# Patient Record
Sex: FEMALE | Race: WHITE | NOT HISPANIC OR LATINO | Employment: FULL TIME | URBAN - METROPOLITAN AREA
[De-identification: names, ages, dates, MRNs, and addresses within clinical notes are randomized per-mention and may not be internally consistent; named-entity substitution may affect disease eponyms.]

---

## 2017-05-17 ENCOUNTER — HISTORICAL (OUTPATIENT)
Dept: ADMINISTRATIVE | Facility: HOSPITAL | Age: 57
End: 2017-05-17

## 2017-05-17 LAB
ABS NEUT (OLG): 4.04 X10(3)/MCL (ref 2.1–9.2)
ALBUMIN SERPL-MCNC: 3.7 GM/DL (ref 3.4–5)
ALBUMIN/GLOB SERPL: 1.5 RATIO (ref 1.1–2)
ALP SERPL-CCNC: 62 UNIT/L (ref 38–126)
ALT SERPL-CCNC: 20 UNIT/L (ref 12–78)
AST SERPL-CCNC: 13 UNIT/L (ref 15–37)
BASOPHILS # BLD AUTO: 0.1 X10(3)/MCL (ref 0–0.2)
BASOPHILS NFR BLD AUTO: 1 %
BILIRUB SERPL-MCNC: 0.4 MG/DL (ref 0.2–1)
BILIRUBIN DIRECT+TOT PNL SERPL-MCNC: 0.1 MG/DL (ref 0–0.5)
BILIRUBIN DIRECT+TOT PNL SERPL-MCNC: 0.3 MG/DL (ref 0–0.8)
BUN SERPL-MCNC: 18 MG/DL (ref 7–18)
CALCIUM SERPL-MCNC: 8.8 MG/DL (ref 8.5–10.1)
CHLORIDE SERPL-SCNC: 107 MMOL/L (ref 98–107)
CO2 SERPL-SCNC: 27 MMOL/L (ref 21–32)
CREAT SERPL-MCNC: 0.69 MG/DL (ref 0.55–1.02)
DEPRECATED CALCIDIOL+CALCIFEROL SERPL-MC: 30.21 NG/ML (ref 30–80)
EOSINOPHIL # BLD AUTO: 0.2 X10(3)/MCL (ref 0–0.9)
EOSINOPHIL NFR BLD AUTO: 4 %
ERYTHROCYTE [DISTWIDTH] IN BLOOD BY AUTOMATED COUNT: 13.6 % (ref 11.5–17)
ERYTHROCYTE [SEDIMENTATION RATE] IN BLOOD: 3 MM/HR (ref 0–20)
GLOBULIN SER-MCNC: 2.5 GM/DL (ref 2.4–3.5)
GLUCOSE SERPL-MCNC: 88 MG/DL (ref 74–106)
HCT VFR BLD AUTO: 44.7 % (ref 37–47)
HGB BLD-MCNC: 14.8 GM/DL (ref 12–16)
LYMPHOCYTES # BLD AUTO: 1.8 X10(3)/MCL (ref 0.6–4.6)
LYMPHOCYTES NFR BLD AUTO: 27 %
MCH RBC QN AUTO: 32.5 PG (ref 27–31)
MCHC RBC AUTO-ENTMCNC: 33.1 GM/DL (ref 33–36)
MCV RBC AUTO: 98.2 FL (ref 80–94)
MONOCYTES # BLD AUTO: 0.4 X10(3)/MCL (ref 0.1–1.3)
MONOCYTES NFR BLD AUTO: 7 %
NEUTROPHILS # BLD AUTO: 4.04 X10(3)/MCL (ref 2.1–9.2)
NEUTROPHILS NFR BLD AUTO: 62 %
PLATELET # BLD AUTO: 262 X10(3)/MCL (ref 130–400)
PMV BLD AUTO: 11.6 FL (ref 9.4–12.4)
POTASSIUM SERPL-SCNC: 4.4 MMOL/L (ref 3.5–5.1)
PROT SERPL-MCNC: 6.2 GM/DL (ref 6.4–8.2)
RBC # BLD AUTO: 4.55 X10(6)/MCL (ref 4.2–5.4)
SODIUM SERPL-SCNC: 142 MMOL/L (ref 136–145)
TSH SERPL-ACNC: 1.3 MIU/ML (ref 0.36–3.74)
VIT B12 SERPL-MCNC: 619 PG/ML (ref 193–986)
WBC # SPEC AUTO: 6.6 X10(3)/MCL (ref 4.5–11.5)

## 2017-12-22 ENCOUNTER — HISTORICAL (OUTPATIENT)
Dept: ADMINISTRATIVE | Facility: HOSPITAL | Age: 57
End: 2017-12-22

## 2017-12-22 LAB
ABS NEUT (OLG): 3.29 X10(3)/MCL (ref 2.1–9.2)
ALBUMIN SERPL-MCNC: 3.6 GM/DL (ref 3.4–5)
ALBUMIN/GLOB SERPL: 1.3 RATIO (ref 1.1–2)
ALP SERPL-CCNC: 67 UNIT/L (ref 38–126)
ALT SERPL-CCNC: 26 UNIT/L (ref 12–78)
AST SERPL-CCNC: 19 UNIT/L (ref 15–37)
BASOPHILS # BLD AUTO: 0 X10(3)/MCL (ref 0–0.2)
BASOPHILS NFR BLD AUTO: 1 %
BILIRUB SERPL-MCNC: 0.2 MG/DL (ref 0.2–1)
BILIRUBIN DIRECT+TOT PNL SERPL-MCNC: 0.1 MG/DL (ref 0–0.5)
BILIRUBIN DIRECT+TOT PNL SERPL-MCNC: 0.1 MG/DL (ref 0–0.8)
BUN SERPL-MCNC: 12 MG/DL (ref 7–18)
CALCIUM SERPL-MCNC: 8.3 MG/DL (ref 8.5–10.1)
CHLORIDE SERPL-SCNC: 105 MMOL/L (ref 98–107)
CHOLEST SERPL-MCNC: 200 MG/DL (ref 0–200)
CHOLEST/HDLC SERPL: 2.2 {RATIO} (ref 0–4)
CO2 SERPL-SCNC: 27 MMOL/L (ref 21–32)
CREAT SERPL-MCNC: 0.47 MG/DL (ref 0.55–1.02)
DEPRECATED CALCIDIOL+CALCIFEROL SERPL-MC: 61.17 NG/ML (ref 30–80)
EOSINOPHIL # BLD AUTO: 0.1 X10(3)/MCL (ref 0–0.9)
EOSINOPHIL NFR BLD AUTO: 2 %
ERYTHROCYTE [DISTWIDTH] IN BLOOD BY AUTOMATED COUNT: 13.2 % (ref 11.5–17)
ERYTHROCYTE [SEDIMENTATION RATE] IN BLOOD: 6 MM/HR (ref 0–20)
GLOBULIN SER-MCNC: 2.8 GM/DL (ref 2.4–3.5)
GLUCOSE SERPL-MCNC: 90 MG/DL (ref 74–106)
HCT VFR BLD AUTO: 45.7 % (ref 37–47)
HDLC SERPL-MCNC: 89 MG/DL (ref 35–60)
HGB BLD-MCNC: 15.6 GM/DL (ref 12–16)
LDLC SERPL CALC-MCNC: 98 MG/DL (ref 0–129)
LYMPHOCYTES # BLD AUTO: 0.6 X10(3)/MCL (ref 0.6–4.6)
LYMPHOCYTES NFR BLD AUTO: 13 %
MCH RBC QN AUTO: 32.6 PG (ref 27–31)
MCHC RBC AUTO-ENTMCNC: 34.1 GM/DL (ref 33–36)
MCV RBC AUTO: 95.4 FL (ref 80–94)
MONOCYTES # BLD AUTO: 0.4 X10(3)/MCL (ref 0.1–1.3)
MONOCYTES NFR BLD AUTO: 9 %
NEUTROPHILS # BLD AUTO: 3.29 X10(3)/MCL (ref 2.1–9.2)
NEUTROPHILS NFR BLD AUTO: 74 %
PLATELET # BLD AUTO: 210 X10(3)/MCL (ref 130–400)
PMV BLD AUTO: 11.5 FL (ref 9.4–12.4)
POTASSIUM SERPL-SCNC: 3.9 MMOL/L (ref 3.5–5.1)
PROT SERPL-MCNC: 6.4 GM/DL (ref 6.4–8.2)
RBC # BLD AUTO: 4.79 X10(6)/MCL (ref 4.2–5.4)
SODIUM SERPL-SCNC: 136 MMOL/L (ref 136–145)
TRIGL SERPL-MCNC: 66 MG/DL (ref 30–150)
VIT B12 SERPL-MCNC: 433 PG/ML (ref 193–986)
VLDLC SERPL CALC-MCNC: 13 MG/DL
WBC # SPEC AUTO: 4.4 X10(3)/MCL (ref 4.5–11.5)

## 2018-06-15 ENCOUNTER — HISTORICAL (OUTPATIENT)
Dept: ADMINISTRATIVE | Facility: HOSPITAL | Age: 58
End: 2018-06-15

## 2018-06-15 LAB
ABS NEUT (OLG): 6.29 X10(3)/MCL (ref 2.1–9.2)
ALBUMIN SERPL-MCNC: 3.7 GM/DL (ref 3.4–5)
ALBUMIN/GLOB SERPL: 1.4 {RATIO}
ALP SERPL-CCNC: 75 UNIT/L (ref 38–126)
ALT SERPL-CCNC: 29 UNIT/L (ref 12–78)
AST SERPL-CCNC: 27 UNIT/L (ref 15–37)
BASOPHILS # BLD AUTO: 0.1 X10(3)/MCL (ref 0–0.2)
BASOPHILS NFR BLD AUTO: 1 %
BILIRUB SERPL-MCNC: 0.3 MG/DL (ref 0.2–1)
BILIRUBIN DIRECT+TOT PNL SERPL-MCNC: 0.1 MG/DL (ref 0–0.2)
BILIRUBIN DIRECT+TOT PNL SERPL-MCNC: 0.2 MG/DL (ref 0–0.8)
BUN SERPL-MCNC: 18 MG/DL (ref 7–18)
CALCIUM SERPL-MCNC: 9 MG/DL (ref 8.5–10.1)
CHLORIDE SERPL-SCNC: 107 MMOL/L (ref 98–107)
CO2 SERPL-SCNC: 25 MMOL/L (ref 21–32)
CREAT SERPL-MCNC: 0.56 MG/DL (ref 0.55–1.02)
DEPRECATED CALCIDIOL+CALCIFEROL SERPL-MC: 30 NG/ML (ref 30–80)
EOSINOPHIL # BLD AUTO: 0.3 X10(3)/MCL (ref 0–0.9)
EOSINOPHIL NFR BLD AUTO: 3 %
ERYTHROCYTE [DISTWIDTH] IN BLOOD BY AUTOMATED COUNT: 13.2 % (ref 11.5–17)
ERYTHROCYTE [SEDIMENTATION RATE] IN BLOOD: 2 MM/HR (ref 0–20)
GLOBULIN SER-MCNC: 2.7 GM/DL (ref 2.4–3.5)
GLUCOSE SERPL-MCNC: 95 MG/DL (ref 74–106)
HCT VFR BLD AUTO: 47.6 % (ref 37–47)
HGB BLD-MCNC: 15.9 GM/DL (ref 12–16)
LYMPHOCYTES # BLD AUTO: 1.2 X10(3)/MCL (ref 0.6–4.6)
LYMPHOCYTES NFR BLD AUTO: 15 %
MCH RBC QN AUTO: 32.6 PG (ref 27–31)
MCHC RBC AUTO-ENTMCNC: 33.4 GM/DL (ref 33–36)
MCV RBC AUTO: 97.7 FL (ref 80–94)
MONOCYTES # BLD AUTO: 0.6 X10(3)/MCL (ref 0.1–1.3)
MONOCYTES NFR BLD AUTO: 6 %
NEUTROPHILS # BLD AUTO: 6.29 X10(3)/MCL (ref 2.1–9.2)
NEUTROPHILS NFR BLD AUTO: 74 %
PLATELET # BLD AUTO: 267 X10(3)/MCL (ref 130–400)
PMV BLD AUTO: 10.3 FL (ref 9.4–12.4)
POTASSIUM SERPL-SCNC: 4.4 MMOL/L (ref 3.5–5.1)
PROT SERPL-MCNC: 6.4 GM/DL (ref 6.4–8.2)
RBC # BLD AUTO: 4.87 X10(6)/MCL (ref 4.2–5.4)
SODIUM SERPL-SCNC: 141 MMOL/L (ref 136–145)
TSH SERPL-ACNC: 0.68 MIU/L (ref 0.36–3.74)
VIT B12 SERPL-MCNC: 671 PG/ML (ref 193–986)
WBC # SPEC AUTO: 8.5 X10(3)/MCL (ref 4.5–11.5)

## 2018-12-03 ENCOUNTER — HISTORICAL (OUTPATIENT)
Dept: ADMINISTRATIVE | Facility: HOSPITAL | Age: 58
End: 2018-12-03

## 2018-12-03 LAB
ABS NEUT (OLG): 4.65 X10(3)/MCL (ref 2.1–9.2)
ALBUMIN SERPL-MCNC: 4 GM/DL (ref 3.4–5)
ALBUMIN/GLOB SERPL: 1.5 RATIO (ref 1.1–2)
ALP SERPL-CCNC: 69 UNIT/L (ref 38–126)
ALT SERPL-CCNC: 24 UNIT/L (ref 12–78)
AST SERPL-CCNC: 15 UNIT/L (ref 15–37)
BASOPHILS # BLD AUTO: 0.1 X10(3)/MCL (ref 0–0.2)
BASOPHILS NFR BLD AUTO: 1 %
BILIRUB SERPL-MCNC: 0.4 MG/DL (ref 0.2–1)
BILIRUBIN DIRECT+TOT PNL SERPL-MCNC: 0.1 MG/DL (ref 0–0.5)
BILIRUBIN DIRECT+TOT PNL SERPL-MCNC: 0.3 MG/DL (ref 0–0.8)
BUN SERPL-MCNC: 16 MG/DL (ref 7–18)
CALCIUM SERPL-MCNC: 8.7 MG/DL (ref 8.5–10.1)
CHLORIDE SERPL-SCNC: 105 MMOL/L (ref 98–107)
CO2 SERPL-SCNC: 29 MMOL/L (ref 21–32)
CREAT SERPL-MCNC: 0.66 MG/DL (ref 0.55–1.02)
DEPRECATED CALCIDIOL+CALCIFEROL SERPL-MC: 41.76 NG/ML (ref 30–80)
EOSINOPHIL # BLD AUTO: 0.2 X10(3)/MCL (ref 0–0.9)
EOSINOPHIL NFR BLD AUTO: 3 %
ERYTHROCYTE [DISTWIDTH] IN BLOOD BY AUTOMATED COUNT: 13.2 % (ref 11.5–17)
ERYTHROCYTE [SEDIMENTATION RATE] IN BLOOD: 2 MM/HR (ref 0–20)
GLOBULIN SER-MCNC: 2.7 GM/DL (ref 2.4–3.5)
GLUCOSE SERPL-MCNC: 85 MG/DL (ref 74–106)
HCT VFR BLD AUTO: 47.8 % (ref 37–47)
HGB BLD-MCNC: 15.5 GM/DL (ref 12–16)
LYMPHOCYTES # BLD AUTO: 1.7 X10(3)/MCL (ref 0.6–4.6)
LYMPHOCYTES NFR BLD AUTO: 24 %
MCH RBC QN AUTO: 32 PG (ref 27–31)
MCHC RBC AUTO-ENTMCNC: 32.4 GM/DL (ref 33–36)
MCV RBC AUTO: 98.6 FL (ref 80–94)
MONOCYTES # BLD AUTO: 0.6 X10(3)/MCL (ref 0.1–1.3)
MONOCYTES NFR BLD AUTO: 8 %
NEUTROPHILS # BLD AUTO: 4.65 X10(3)/MCL (ref 2.1–9.2)
NEUTROPHILS NFR BLD AUTO: 64 %
PLATELET # BLD AUTO: 262 X10(3)/MCL (ref 130–400)
PMV BLD AUTO: 11.3 FL (ref 9.4–12.4)
POTASSIUM SERPL-SCNC: 4.4 MMOL/L (ref 3.5–5.1)
PROT SERPL-MCNC: 6.7 GM/DL (ref 6.4–8.2)
RBC # BLD AUTO: 4.85 X10(6)/MCL (ref 4.2–5.4)
SODIUM SERPL-SCNC: 139 MMOL/L (ref 136–145)
VIT B12 SERPL-MCNC: 857 PG/ML (ref 193–986)
WBC # SPEC AUTO: 7.3 X10(3)/MCL (ref 4.5–11.5)

## 2019-01-25 ENCOUNTER — HISTORICAL (OUTPATIENT)
Dept: INFUSION THERAPY | Facility: HOSPITAL | Age: 59
End: 2019-01-25

## 2019-01-30 ENCOUNTER — HISTORICAL (OUTPATIENT)
Dept: ADMINISTRATIVE | Facility: HOSPITAL | Age: 59
End: 2019-01-30

## 2019-10-17 ENCOUNTER — HISTORICAL (OUTPATIENT)
Dept: ADMINISTRATIVE | Facility: HOSPITAL | Age: 59
End: 2019-10-17

## 2019-10-17 LAB
ABS NEUT (OLG): 4.6 X10(3)/MCL (ref 2.1–9.2)
ALBUMIN SERPL-MCNC: 3.7 GM/DL (ref 3.4–5)
ALBUMIN/GLOB SERPL: 1.3 {RATIO}
ALP SERPL-CCNC: 85 UNIT/L (ref 38–126)
ALT SERPL-CCNC: 20 UNIT/L (ref 12–78)
AST SERPL-CCNC: 11 UNIT/L (ref 15–37)
BASOPHILS # BLD AUTO: 0.1 X10(3)/MCL (ref 0–0.2)
BASOPHILS NFR BLD AUTO: 1 %
BILIRUB SERPL-MCNC: 0.3 MG/DL (ref 0.2–1)
BILIRUBIN DIRECT+TOT PNL SERPL-MCNC: 0.1 MG/DL (ref 0–0.2)
BILIRUBIN DIRECT+TOT PNL SERPL-MCNC: 0.2 MG/DL (ref 0–0.8)
BUN SERPL-MCNC: 20 MG/DL (ref 7–18)
CALCIUM SERPL-MCNC: 8.9 MG/DL (ref 8.5–10.1)
CHLORIDE SERPL-SCNC: 107 MMOL/L (ref 98–107)
CO2 SERPL-SCNC: 27 MMOL/L (ref 21–32)
CREAT SERPL-MCNC: 0.59 MG/DL (ref 0.55–1.02)
DEPRECATED CALCIDIOL+CALCIFEROL SERPL-MC: 25.53 NG/ML (ref 30–80)
EOSINOPHIL # BLD AUTO: 0.3 X10(3)/MCL (ref 0–0.9)
EOSINOPHIL NFR BLD AUTO: 4 %
ERYTHROCYTE [DISTWIDTH] IN BLOOD BY AUTOMATED COUNT: 13.9 % (ref 11.5–17)
ERYTHROCYTE [SEDIMENTATION RATE] IN BLOOD: 3 MM/HR (ref 0–20)
GLOBULIN SER-MCNC: 2.8 GM/DL (ref 2.4–3.5)
GLUCOSE SERPL-MCNC: 97 MG/DL (ref 74–106)
HCT VFR BLD AUTO: 48.5 % (ref 37–47)
HGB BLD-MCNC: 15.7 GM/DL (ref 12–16)
LYMPHOCYTES # BLD AUTO: 1.5 X10(3)/MCL (ref 0.6–4.6)
LYMPHOCYTES NFR BLD AUTO: 22 %
MCH RBC QN AUTO: 30.9 PG (ref 27–31)
MCHC RBC AUTO-ENTMCNC: 32.4 GM/DL (ref 33–36)
MCV RBC AUTO: 95.5 FL (ref 80–94)
MONOCYTES # BLD AUTO: 0.4 X10(3)/MCL (ref 0.1–1.3)
MONOCYTES NFR BLD AUTO: 5 %
NEUTROPHILS # BLD AUTO: 4.6 X10(3)/MCL (ref 2.1–9.2)
NEUTROPHILS NFR BLD AUTO: 67 %
PLATELET # BLD AUTO: 332 X10(3)/MCL (ref 130–400)
PMV BLD AUTO: 10.6 FL (ref 9.4–12.4)
POTASSIUM SERPL-SCNC: 4.2 MMOL/L (ref 3.5–5.1)
PROT SERPL-MCNC: 6.5 GM/DL (ref 6.4–8.2)
RBC # BLD AUTO: 5.08 X10(6)/MCL (ref 4.2–5.4)
SODIUM SERPL-SCNC: 139 MMOL/L (ref 136–145)
VIT B12 SERPL-MCNC: 877 PG/ML (ref 193–986)
WBC # SPEC AUTO: 6.8 X10(3)/MCL (ref 4.5–11.5)

## 2020-01-29 ENCOUNTER — HISTORICAL (OUTPATIENT)
Dept: ADMINISTRATIVE | Facility: HOSPITAL | Age: 60
End: 2020-01-29

## 2020-01-29 LAB
ALBUMIN SERPL-MCNC: 4.1 GM/DL (ref 3.4–5)
BUN SERPL-MCNC: 14 MG/DL (ref 7–18)
CALCIUM SERPL-MCNC: 9.4 MG/DL (ref 8.5–10.1)
CHLORIDE SERPL-SCNC: 105 MMOL/L (ref 98–107)
CO2 SERPL-SCNC: 31 MMOL/L (ref 21–32)
CREAT SERPL-MCNC: 0.5 MG/DL (ref 0.6–1.3)
GLUCOSE SERPL-MCNC: 95 MG/DL (ref 74–106)
PHOSPHATE SERPL-MCNC: 3.4 MG/DL (ref 2.5–4.9)
POTASSIUM SERPL-SCNC: 4.2 MMOL/L (ref 3.5–5.1)
SODIUM SERPL-SCNC: 138 MMOL/L (ref 136–145)

## 2020-08-12 ENCOUNTER — HISTORICAL (OUTPATIENT)
Dept: ADMINISTRATIVE | Facility: HOSPITAL | Age: 60
End: 2020-08-12

## 2020-08-12 LAB
ABS NEUT (OLG): 6.06 X10(3)/MCL (ref 2.1–9.2)
ALBUMIN SERPL-MCNC: 3.5 GM/DL (ref 3.4–5)
ALBUMIN/GLOB SERPL: 1.3 RATIO (ref 1.1–2)
ALP SERPL-CCNC: 102 UNIT/L (ref 40–150)
ALT SERPL-CCNC: 12 UNIT/L (ref 0–55)
AST SERPL-CCNC: 11 UNIT/L (ref 5–34)
BASOPHILS # BLD AUTO: 0.1 X10(3)/MCL (ref 0–0.2)
BASOPHILS NFR BLD AUTO: 1 %
BILIRUB SERPL-MCNC: 0.3 MG/DL
BILIRUBIN DIRECT+TOT PNL SERPL-MCNC: 0.1 MG/DL (ref 0–0.8)
BILIRUBIN DIRECT+TOT PNL SERPL-MCNC: 0.2 MG/DL (ref 0–0.5)
BUN SERPL-MCNC: 11.5 MG/DL (ref 9.8–20.1)
CALCIUM SERPL-MCNC: 8.6 MG/DL (ref 8.4–10.2)
CHLORIDE SERPL-SCNC: 103 MMOL/L (ref 98–107)
CO2 SERPL-SCNC: 28 MMOL/L (ref 23–31)
CREAT SERPL-MCNC: 0.57 MG/DL (ref 0.55–1.02)
DEPRECATED CALCIDIOL+CALCIFEROL SERPL-MC: 30.6 NG/ML (ref 6.6–49.9)
EOSINOPHIL # BLD AUTO: 0.3 X10(3)/MCL (ref 0–0.9)
EOSINOPHIL NFR BLD AUTO: 3 %
ERYTHROCYTE [DISTWIDTH] IN BLOOD BY AUTOMATED COUNT: 13.6 % (ref 11.5–17)
ERYTHROCYTE [SEDIMENTATION RATE] IN BLOOD: 11 MM/HR (ref 0–20)
GLOBULIN SER-MCNC: 2.6 GM/DL (ref 2.4–3.5)
GLUCOSE SERPL-MCNC: 92 MG/DL (ref 82–115)
HCT VFR BLD AUTO: 47.3 % (ref 37–47)
HGB BLD-MCNC: 15.4 GM/DL (ref 12–16)
LYMPHOCYTES # BLD AUTO: 1.3 X10(3)/MCL (ref 0.6–4.6)
LYMPHOCYTES NFR BLD AUTO: 16 %
MCH RBC QN AUTO: 31.6 PG (ref 27–31)
MCHC RBC AUTO-ENTMCNC: 32.6 GM/DL (ref 33–36)
MCV RBC AUTO: 96.9 FL (ref 80–94)
MONOCYTES # BLD AUTO: 0.5 X10(3)/MCL (ref 0.1–1.3)
MONOCYTES NFR BLD AUTO: 6 %
NEUTROPHILS # BLD AUTO: 6.06 X10(3)/MCL (ref 2.1–9.2)
NEUTROPHILS NFR BLD AUTO: 74 %
PLATELET # BLD AUTO: 421 X10(3)/MCL (ref 130–400)
PMV BLD AUTO: 9.9 FL (ref 9.4–12.4)
POTASSIUM SERPL-SCNC: 4.6 MMOL/L (ref 3.5–5.1)
PROT SERPL-MCNC: 6.1 GM/DL (ref 5.8–7.6)
RBC # BLD AUTO: 4.88 X10(6)/MCL (ref 4.2–5.4)
SODIUM SERPL-SCNC: 139 MMOL/L (ref 136–145)
VIT B12 SERPL-MCNC: 881 PG/ML (ref 213–816)
WBC # SPEC AUTO: 8.2 X10(3)/MCL (ref 4.5–11.5)

## 2020-08-13 ENCOUNTER — HISTORICAL (OUTPATIENT)
Dept: ADMINISTRATIVE | Facility: HOSPITAL | Age: 60
End: 2020-08-13

## 2020-12-04 ENCOUNTER — HISTORICAL (OUTPATIENT)
Dept: INFUSION THERAPY | Facility: HOSPITAL | Age: 60
End: 2020-12-04

## 2021-03-10 ENCOUNTER — HISTORICAL (OUTPATIENT)
Dept: ADMINISTRATIVE | Facility: HOSPITAL | Age: 61
End: 2021-03-10

## 2021-03-10 LAB
BUN SERPL-MCNC: 11.9 MG/DL (ref 9.8–20.1)
CALCIUM SERPL-MCNC: 9.1 MG/DL (ref 8.4–10.2)
CHLORIDE SERPL-SCNC: 103 MMOL/L (ref 98–107)
CO2 SERPL-SCNC: 30 MMOL/L (ref 23–31)
CREAT SERPL-MCNC: 0.59 MG/DL (ref 0.55–1.02)
CREAT/UREA NIT SERPL: 20
DEPRECATED CALCIDIOL+CALCIFEROL SERPL-MC: 20.5 NG/ML (ref 30–80)
GLUCOSE SERPL-MCNC: 91 MG/DL (ref 82–115)
POTASSIUM SERPL-SCNC: 3.8 MMOL/L (ref 3.5–5.1)
SODIUM SERPL-SCNC: 142 MMOL/L (ref 136–145)

## 2021-03-18 ENCOUNTER — HISTORICAL (OUTPATIENT)
Dept: ADMINISTRATIVE | Facility: HOSPITAL | Age: 61
End: 2021-03-18

## 2021-03-18 LAB
ABS NEUT (OLG): 6.75 X10(3)/MCL (ref 2.1–9.2)
ALBUMIN SERPL-MCNC: 3.9 GM/DL (ref 3.4–4.8)
ALBUMIN/GLOB SERPL: 1.4 RATIO (ref 1.1–2)
ALP SERPL-CCNC: 93 UNIT/L (ref 40–150)
ALT SERPL-CCNC: 12 UNIT/L (ref 0–55)
AST SERPL-CCNC: 13 UNIT/L (ref 5–34)
BASOPHILS # BLD AUTO: 0.1 X10(3)/MCL (ref 0–0.2)
BASOPHILS NFR BLD AUTO: 1 %
BILIRUB SERPL-MCNC: 0.4 MG/DL
BILIRUBIN DIRECT+TOT PNL SERPL-MCNC: 0.2 MG/DL (ref 0–0.5)
BILIRUBIN DIRECT+TOT PNL SERPL-MCNC: 0.2 MG/DL (ref 0–0.8)
BUN SERPL-MCNC: 11.4 MG/DL (ref 9.8–20.1)
CALCIUM SERPL-MCNC: 10 MG/DL (ref 8.4–10.2)
CHLORIDE SERPL-SCNC: 102 MMOL/L (ref 98–107)
CHOLEST SERPL-MCNC: 196 MG/DL
CHOLEST/HDLC SERPL: 3 {RATIO} (ref 0–5)
CO2 SERPL-SCNC: 28 MMOL/L (ref 23–31)
CREAT SERPL-MCNC: 0.6 MG/DL (ref 0.55–1.02)
CRP SERPL HS-MCNC: 1.22 MG/DL
DEPRECATED CALCIDIOL+CALCIFEROL SERPL-MC: 27.2 NG/ML (ref 30–80)
EOSINOPHIL # BLD AUTO: 0.3 X10(3)/MCL (ref 0–0.9)
EOSINOPHIL NFR BLD AUTO: 4 %
ERYTHROCYTE [DISTWIDTH] IN BLOOD BY AUTOMATED COUNT: 13.3 % (ref 11.5–17)
GLOBULIN SER-MCNC: 2.7 GM/DL (ref 2.4–3.5)
GLUCOSE SERPL-MCNC: 88 MG/DL (ref 82–115)
HCT VFR BLD AUTO: 48.6 % (ref 37–47)
HDLC SERPL-MCNC: 64 MG/DL (ref 35–60)
HGB BLD-MCNC: 15.9 GM/DL (ref 12–16)
LDLC SERPL CALC-MCNC: 119 MG/DL (ref 50–140)
LYMPHOCYTES # BLD AUTO: 1.5 X10(3)/MCL (ref 0.6–4.6)
LYMPHOCYTES NFR BLD AUTO: 16 %
MCH RBC QN AUTO: 31.4 PG (ref 27–31)
MCHC RBC AUTO-ENTMCNC: 32.7 GM/DL (ref 33–36)
MCV RBC AUTO: 96 FL (ref 80–94)
MONOCYTES # BLD AUTO: 0.5 X10(3)/MCL (ref 0.1–1.3)
MONOCYTES NFR BLD AUTO: 6 %
NEUTROPHILS # BLD AUTO: 6.75 X10(3)/MCL (ref 2.1–9.2)
NEUTROPHILS NFR BLD AUTO: 74 %
PLATELET # BLD AUTO: 484 X10(3)/MCL (ref 130–400)
PMV BLD AUTO: 10.8 FL (ref 9.4–12.4)
POTASSIUM SERPL-SCNC: 4.5 MMOL/L (ref 3.5–5.1)
PROT SERPL-MCNC: 6.6 GM/DL (ref 5.8–7.6)
RBC # BLD AUTO: 5.06 X10(6)/MCL (ref 4.2–5.4)
SODIUM SERPL-SCNC: 139 MMOL/L (ref 136–145)
TRIGL SERPL-MCNC: 65 MG/DL (ref 37–140)
TSH SERPL-ACNC: 0.82 UIU/ML (ref 0.35–4.94)
VIT B12 SERPL-MCNC: 730 PG/ML (ref 213–816)
VLDLC SERPL CALC-MCNC: 13 MG/DL
WBC # SPEC AUTO: 9.2 X10(3)/MCL (ref 4.5–11.5)

## 2021-06-30 LAB
HPV16+18+H RISK 12 DNA CVX-IMP: NEGATIVE
PAP RECOMMENDATION EXT: NORMAL
PAP SMEAR: NORMAL

## 2022-01-19 ENCOUNTER — HISTORICAL (OUTPATIENT)
Dept: ADMINISTRATIVE | Facility: HOSPITAL | Age: 62
End: 2022-01-19

## 2022-01-19 LAB
ABS NEUT (OLG): 5.75 X10(3)/MCL (ref 2.1–9.2)
ALBUMIN SERPL-MCNC: 3.7 GM/DL (ref 3.4–4.8)
ALBUMIN/GLOB SERPL: 1.3 RATIO (ref 1.1–2)
ALP SERPL-CCNC: 80 UNIT/L (ref 40–150)
ALT SERPL-CCNC: 15 UNIT/L (ref 0–55)
AST SERPL-CCNC: 14 UNIT/L (ref 5–34)
BASOPHILS # BLD AUTO: 0.1 X10(3)/MCL (ref 0–0.2)
BASOPHILS NFR BLD AUTO: 1 %
BILIRUB SERPL-MCNC: 0.3 MG/DL
BILIRUBIN DIRECT+TOT PNL SERPL-MCNC: 0.1 MG/DL (ref 0–0.8)
BILIRUBIN DIRECT+TOT PNL SERPL-MCNC: 0.2 MG/DL (ref 0–0.5)
BUN SERPL-MCNC: 19.4 MG/DL (ref 9.8–20.1)
CALCIUM SERPL-MCNC: 9.4 MG/DL (ref 8.7–10.5)
CHLORIDE SERPL-SCNC: 103 MMOL/L (ref 98–107)
CO2 SERPL-SCNC: 28 MMOL/L (ref 23–31)
CREAT SERPL-MCNC: 0.62 MG/DL (ref 0.55–1.02)
CRP SERPL-MCNC: 0.66 MG/DL
DEPRECATED CALCIDIOL+CALCIFEROL SERPL-MC: 35.3 NG/ML (ref 30–80)
EOSINOPHIL # BLD AUTO: 0.2 X10(3)/MCL (ref 0–0.9)
EOSINOPHIL NFR BLD AUTO: 3 %
ERYTHROCYTE [DISTWIDTH] IN BLOOD BY AUTOMATED COUNT: 13.5 % (ref 11.5–17)
GLOBULIN SER-MCNC: 2.8 GM/DL (ref 2.4–3.5)
GLUCOSE SERPL-MCNC: 96 MG/DL (ref 82–115)
HCT VFR BLD AUTO: 46.2 % (ref 37–47)
HGB BLD-MCNC: 15.2 GM/DL (ref 12–16)
LYMPHOCYTES # BLD AUTO: 1.3 X10(3)/MCL (ref 0.6–4.6)
LYMPHOCYTES NFR BLD AUTO: 16 %
MCH RBC QN AUTO: 31.9 PG (ref 27–31)
MCHC RBC AUTO-ENTMCNC: 32.9 GM/DL (ref 33–36)
MCV RBC AUTO: 96.9 FL (ref 80–94)
MONOCYTES # BLD AUTO: 0.6 X10(3)/MCL (ref 0.1–1.3)
MONOCYTES NFR BLD AUTO: 7 %
NEUTROPHILS # BLD AUTO: 5.75 X10(3)/MCL (ref 2.1–9.2)
NEUTROPHILS NFR BLD AUTO: 72 %
PLATELET # BLD AUTO: 461 X10(3)/MCL (ref 130–400)
PMV BLD AUTO: 10.5 FL (ref 9.4–12.4)
POTASSIUM SERPL-SCNC: 3.6 MMOL/L (ref 3.5–5.1)
PROT SERPL-MCNC: 6.5 GM/DL (ref 5.8–7.6)
RBC # BLD AUTO: 4.77 X10(6)/MCL (ref 4.2–5.4)
SODIUM SERPL-SCNC: 140 MMOL/L (ref 136–145)
VIT B12 SERPL-MCNC: 598 PG/ML (ref 213–816)
WBC # SPEC AUTO: 8 X10(3)/MCL (ref 4.5–11.5)

## 2022-04-10 ENCOUNTER — HISTORICAL (OUTPATIENT)
Dept: ADMINISTRATIVE | Facility: HOSPITAL | Age: 62
End: 2022-04-10
Payer: COMMERCIAL

## 2022-04-25 VITALS
BODY MASS INDEX: 20.6 KG/M2 | OXYGEN SATURATION: 93 % | DIASTOLIC BLOOD PRESSURE: 58 MMHG | SYSTOLIC BLOOD PRESSURE: 120 MMHG | HEIGHT: 61 IN | WEIGHT: 109.13 LBS

## 2022-05-16 RX ORDER — TIOTROPIUM BROMIDE 18 UG/1
CAPSULE ORAL; RESPIRATORY (INHALATION)
Qty: 30 CAPSULE | Refills: 0 | Status: SHIPPED | OUTPATIENT
Start: 2022-05-16 | End: 2022-07-05

## 2022-05-16 RX ORDER — ALBUTEROL SULFATE 90 UG/1
AEROSOL, METERED RESPIRATORY (INHALATION)
Qty: 9 G | Refills: 0 | Status: SHIPPED | OUTPATIENT
Start: 2022-05-16 | End: 2022-05-18

## 2022-05-16 RX ORDER — ALBUTEROL SULFATE 0.83 MG/ML
SOLUTION RESPIRATORY (INHALATION)
Qty: 180 ML | Refills: 0 | Status: SHIPPED | OUTPATIENT
Start: 2022-05-16 | End: 2022-06-01

## 2022-05-18 RX ORDER — ALBUTEROL SULFATE 90 UG/1
AEROSOL, METERED RESPIRATORY (INHALATION)
Qty: 9 G | Refills: 0 | Status: SHIPPED | OUTPATIENT
Start: 2022-05-18 | End: 2022-07-05

## 2022-06-01 RX ORDER — ALBUTEROL SULFATE 0.83 MG/ML
SOLUTION RESPIRATORY (INHALATION)
Qty: 180 ML | Refills: 0 | Status: SHIPPED | OUTPATIENT
Start: 2022-06-01 | End: 2022-07-05

## 2022-06-03 ENCOUNTER — TELEPHONE (OUTPATIENT)
Dept: ENDOCRINOLOGY | Facility: CLINIC | Age: 62
End: 2022-06-03
Payer: COMMERCIAL

## 2022-06-03 DIAGNOSIS — M81.0 OSTEOPOROSIS, UNSPECIFIED OSTEOPOROSIS TYPE, UNSPECIFIED PATHOLOGICAL FRACTURE PRESENCE: Primary | ICD-10-CM

## 2022-06-03 DIAGNOSIS — E55.9 HYPOVITAMINOSIS D: ICD-10-CM

## 2022-07-05 RX ORDER — ALBUTEROL SULFATE 0.83 MG/ML
SOLUTION RESPIRATORY (INHALATION)
Qty: 180 ML | Refills: 0 | Status: SHIPPED | OUTPATIENT
Start: 2022-07-05 | End: 2022-08-17 | Stop reason: SDUPTHER

## 2022-07-05 RX ORDER — TIOTROPIUM BROMIDE 18 UG/1
CAPSULE ORAL; RESPIRATORY (INHALATION)
Qty: 30 CAPSULE | Refills: 0 | Status: SHIPPED | OUTPATIENT
Start: 2022-07-05 | End: 2022-08-17 | Stop reason: SDUPTHER

## 2022-07-05 RX ORDER — ALBUTEROL SULFATE 90 UG/1
AEROSOL, METERED RESPIRATORY (INHALATION)
Qty: 9 G | Refills: 0 | Status: SHIPPED | OUTPATIENT
Start: 2022-07-05 | End: 2022-07-28

## 2022-07-15 ENCOUNTER — LAB VISIT (OUTPATIENT)
Dept: LAB | Facility: HOSPITAL | Age: 62
End: 2022-07-15
Attending: NURSE PRACTITIONER
Payer: COMMERCIAL

## 2022-07-15 DIAGNOSIS — E53.9 VITAMIN B-COMPLEX DEFICIENCY: ICD-10-CM

## 2022-07-15 DIAGNOSIS — R14.2 FLATULENCE, ERUCTATION, AND GAS PAIN: ICD-10-CM

## 2022-07-15 DIAGNOSIS — K50.80 REGIONAL ENTERITIS OF SMALL INTESTINE WITH LARGE INTESTINE: ICD-10-CM

## 2022-07-15 DIAGNOSIS — Z79.899 ENCOUNTER FOR LONG-TERM (CURRENT) USE OF OTHER MEDICATIONS: Primary | ICD-10-CM

## 2022-07-15 DIAGNOSIS — R14.3 FLATULENCE, ERUCTATION, AND GAS PAIN: ICD-10-CM

## 2022-07-15 DIAGNOSIS — F17.290 CIGAR SMOKER: ICD-10-CM

## 2022-07-15 DIAGNOSIS — E55.9 AVITAMINOSIS D: ICD-10-CM

## 2022-07-15 DIAGNOSIS — R14.1 FLATULENCE, ERUCTATION, AND GAS PAIN: ICD-10-CM

## 2022-07-15 LAB
ALBUMIN SERPL-MCNC: 3.8 GM/DL (ref 3.4–4.8)
ALBUMIN/GLOB SERPL: 1.6 RATIO (ref 1.1–2)
ALP SERPL-CCNC: 90 UNIT/L (ref 40–150)
ALT SERPL-CCNC: 12 UNIT/L (ref 0–55)
AST SERPL-CCNC: 13 UNIT/L (ref 5–34)
BASOPHILS # BLD AUTO: 0.09 X10(3)/MCL (ref 0–0.2)
BASOPHILS NFR BLD AUTO: 1 %
BILIRUBIN DIRECT+TOT PNL SERPL-MCNC: 0.2 MG/DL
BUN SERPL-MCNC: 17.2 MG/DL (ref 9.8–20.1)
CALCIUM SERPL-MCNC: 9.3 MG/DL (ref 8.4–10.2)
CHLORIDE SERPL-SCNC: 106 MMOL/L (ref 98–107)
CHOLEST SERPL-MCNC: 198 MG/DL
CHOLEST/HDLC SERPL: 3 {RATIO} (ref 0–5)
CO2 SERPL-SCNC: 27 MMOL/L (ref 23–31)
CREAT SERPL-MCNC: 0.62 MG/DL (ref 0.55–1.02)
CRP SERPL HS-MCNC: 7.89 MG/L
DEPRECATED CALCIDIOL+CALCIFEROL SERPL-MC: 24.6 NG/ML (ref 30–80)
EOSINOPHIL # BLD AUTO: 0.21 X10(3)/MCL (ref 0–0.9)
EOSINOPHIL NFR BLD AUTO: 2.4 %
ERYTHROCYTE [DISTWIDTH] IN BLOOD BY AUTOMATED COUNT: 14 % (ref 11.5–17)
GLOBULIN SER-MCNC: 2.4 GM/DL (ref 2.4–3.5)
GLUCOSE SERPL-MCNC: 95 MG/DL (ref 82–115)
HCT VFR BLD AUTO: 43.8 % (ref 37–47)
HDLC SERPL-MCNC: 67 MG/DL (ref 35–60)
HGB BLD-MCNC: 14.2 GM/DL (ref 12–16)
IMM GRANULOCYTES # BLD AUTO: 0.03 X10(3)/MCL (ref 0–0.04)
IMM GRANULOCYTES NFR BLD AUTO: 0.3 %
LDLC SERPL CALC-MCNC: 122 MG/DL (ref 50–140)
LYMPHOCYTES # BLD AUTO: 1.22 X10(3)/MCL (ref 0.6–4.6)
LYMPHOCYTES NFR BLD AUTO: 14.1 %
MCH RBC QN AUTO: 32.1 PG (ref 27–31)
MCHC RBC AUTO-ENTMCNC: 32.4 MG/DL (ref 33–36)
MCV RBC AUTO: 99.1 FL (ref 80–94)
MONOCYTES # BLD AUTO: 0.52 X10(3)/MCL (ref 0.1–1.3)
MONOCYTES NFR BLD AUTO: 6 %
NEUTROPHILS # BLD AUTO: 6.6 X10(3)/MCL (ref 2.1–9.2)
NEUTROPHILS NFR BLD AUTO: 76.2 %
NRBC BLD AUTO-RTO: 0 %
PLATELET # BLD AUTO: 505 X10(3)/MCL (ref 130–400)
PMV BLD AUTO: 10.5 FL (ref 7.4–10.4)
POTASSIUM SERPL-SCNC: 4.9 MMOL/L (ref 3.5–5.1)
PROT SERPL-MCNC: 6.2 GM/DL (ref 5.8–7.6)
RBC # BLD AUTO: 4.42 X10(6)/MCL (ref 4.2–5.4)
SODIUM SERPL-SCNC: 142 MMOL/L (ref 136–145)
TRIGL SERPL-MCNC: 46 MG/DL (ref 37–140)
TSH SERPL-ACNC: 0.71 UIU/ML (ref 0.35–4.94)
VIT B12 SERPL-MCNC: 716 PG/ML (ref 213–816)
VLDLC SERPL CALC-MCNC: 9 MG/DL
WBC # SPEC AUTO: 8.6 X10(3)/MCL (ref 4.5–11.5)

## 2022-07-15 PROCEDURE — 80061 LIPID PANEL: CPT

## 2022-07-15 PROCEDURE — 85025 COMPLETE CBC W/AUTO DIFF WBC: CPT

## 2022-07-15 PROCEDURE — 82306 VITAMIN D 25 HYDROXY: CPT

## 2022-07-15 PROCEDURE — 86141 C-REACTIVE PROTEIN HS: CPT

## 2022-07-15 PROCEDURE — 82607 VITAMIN B-12: CPT

## 2022-07-15 PROCEDURE — 84443 ASSAY THYROID STIM HORMONE: CPT

## 2022-07-15 PROCEDURE — 36415 COLL VENOUS BLD VENIPUNCTURE: CPT

## 2022-07-15 PROCEDURE — 80053 COMPREHEN METABOLIC PANEL: CPT

## 2022-07-22 ENCOUNTER — LAB VISIT (OUTPATIENT)
Dept: LAB | Facility: HOSPITAL | Age: 62
End: 2022-07-22
Attending: NURSE PRACTITIONER
Payer: COMMERCIAL

## 2022-07-22 DIAGNOSIS — I10 ESSENTIAL HYPERTENSION, MALIGNANT: ICD-10-CM

## 2022-07-22 DIAGNOSIS — R79.82 ELEVATED C-REACTIVE PROTEIN (CRP): ICD-10-CM

## 2022-07-22 DIAGNOSIS — G40.909 EPILEPSY: ICD-10-CM

## 2022-07-22 DIAGNOSIS — Z51.81 ENCOUNTER FOR THERAPEUTIC DRUG MONITORING: Primary | ICD-10-CM

## 2022-07-22 DIAGNOSIS — R82.90 NONSPECIFIC FINDING ON EXAMINATION OF URINE: ICD-10-CM

## 2022-07-22 DIAGNOSIS — K50.80 REGIONAL ENTERITIS OF SMALL INTESTINE WITH LARGE INTESTINE: ICD-10-CM

## 2022-07-22 DIAGNOSIS — M81.0 SENILE OSTEOPOROSIS: ICD-10-CM

## 2022-07-22 LAB
APPEARANCE UR: CLEAR
BACTERIA #/AREA URNS AUTO: NORMAL /HPF
BILIRUB UR QL STRIP.AUTO: NEGATIVE MG/DL
COLOR UR AUTO: YELLOW
GLUCOSE UR QL STRIP.AUTO: NEGATIVE MG/DL
KETONES UR QL STRIP.AUTO: NEGATIVE MG/DL
LEUKOCYTE ESTERASE UR QL STRIP.AUTO: NEGATIVE UNIT/L
NITRITE UR QL STRIP.AUTO: NEGATIVE
PH UR STRIP.AUTO: 7 [PH]
PROT UR QL STRIP.AUTO: NEGATIVE MG/DL
RBC #/AREA URNS AUTO: <5 /HPF
RBC UR QL AUTO: NEGATIVE UNIT/L
SP GR UR STRIP.AUTO: 1.02 (ref 1–1.03)
SQUAMOUS #/AREA URNS AUTO: <5 /HPF
UROBILINOGEN UR STRIP-ACNC: 0.2 MG/DL
WBC #/AREA URNS AUTO: <5 /HPF

## 2022-07-22 PROCEDURE — 83520 IMMUNOASSAY QUANT NOS NONAB: CPT

## 2022-07-22 PROCEDURE — 36415 COLL VENOUS BLD VENIPUNCTURE: CPT

## 2022-07-22 PROCEDURE — 81001 URINALYSIS AUTO W/SCOPE: CPT

## 2022-07-25 LAB — ADALIMUMAB SERPL IA-MCNC: <0.8 MCG/ML

## 2022-07-26 LAB
ADALIMUMAB AB SERPL-ACNC: <10 AU/ML
GAMMA INTERFERON BACKGROUND BLD IA-ACNC: 0.01 IU/ML
M TB IFN-G BLD-IMP: NEGATIVE
M TB IFN-G CD4+ BCKGRND COR BLD-ACNC: 0 IU/ML
M TB IFN-G CD4+CD8+ BCKGRND COR BLD-ACNC: 0 IU/ML
MITOGEN IGNF BCKGRD COR BLD-ACNC: 8.21 IU/ML

## 2022-07-28 RX ORDER — ALBUTEROL SULFATE 90 UG/1
AEROSOL, METERED RESPIRATORY (INHALATION)
Qty: 9 G | Refills: 0 | Status: SHIPPED | OUTPATIENT
Start: 2022-07-28 | End: 2022-08-17 | Stop reason: SDUPTHER

## 2022-08-12 ENCOUNTER — LAB REQUISITION (OUTPATIENT)
Dept: LAB | Facility: HOSPITAL | Age: 62
End: 2022-08-12
Payer: COMMERCIAL

## 2022-08-12 DIAGNOSIS — K50.80 CROHN'S DISEASE OF BOTH SMALL AND LARGE INTESTINE WITHOUT COMPLICATIONS: ICD-10-CM

## 2022-08-12 DIAGNOSIS — R79.82 ELEVATED C-REACTIVE PROTEIN (CRP): ICD-10-CM

## 2022-08-12 DIAGNOSIS — I10 ESSENTIAL (PRIMARY) HYPERTENSION: ICD-10-CM

## 2022-08-12 DIAGNOSIS — R82.90 UNSPECIFIED ABNORMAL FINDINGS IN URINE: ICD-10-CM

## 2022-08-12 DIAGNOSIS — G40.909 EPILEPSY, UNSPECIFIED, NOT INTRACTABLE, WITHOUT STATUS EPILEPTICUS: ICD-10-CM

## 2022-08-12 DIAGNOSIS — M81.0 AGE-RELATED OSTEOPOROSIS WITHOUT CURRENT PATHOLOGICAL FRACTURE: ICD-10-CM

## 2022-08-12 PROCEDURE — 83993 ASSAY FOR CALPROTECTIN FECAL: CPT | Performed by: NURSE PRACTITIONER

## 2022-08-15 LAB — CALPROTECTIN STL-MCNT: 61.2 MCG/G

## 2022-08-17 ENCOUNTER — OFFICE VISIT (OUTPATIENT)
Dept: FAMILY MEDICINE | Facility: CLINIC | Age: 62
End: 2022-08-17
Payer: COMMERCIAL

## 2022-08-17 ENCOUNTER — TELEPHONE (OUTPATIENT)
Dept: FAMILY MEDICINE | Facility: CLINIC | Age: 62
End: 2022-08-17

## 2022-08-17 VITALS
TEMPERATURE: 98 F | DIASTOLIC BLOOD PRESSURE: 77 MMHG | HEART RATE: 73 BPM | HEIGHT: 62 IN | RESPIRATION RATE: 16 BRPM | SYSTOLIC BLOOD PRESSURE: 112 MMHG | OXYGEN SATURATION: 95 % | WEIGHT: 107 LBS | BODY MASS INDEX: 19.69 KG/M2

## 2022-08-17 DIAGNOSIS — F41.1 GENERALIZED ANXIETY DISORDER: Chronic | ICD-10-CM

## 2022-08-17 DIAGNOSIS — Z11.59 NEED FOR HEPATITIS C SCREENING TEST: ICD-10-CM

## 2022-08-17 DIAGNOSIS — Z53.20 LUNG CANCER SCREENING DECLINED BY PATIENT: ICD-10-CM

## 2022-08-17 DIAGNOSIS — M54.2 NECK PAIN: Chronic | ICD-10-CM

## 2022-08-17 DIAGNOSIS — Z12.2 SCREENING FOR LUNG CANCER: ICD-10-CM

## 2022-08-17 DIAGNOSIS — I10 PRIMARY HYPERTENSION: Primary | Chronic | ICD-10-CM

## 2022-08-17 DIAGNOSIS — J44.9 CHRONIC OBSTRUCTIVE PULMONARY DISEASE, UNSPECIFIED COPD TYPE: Chronic | ICD-10-CM

## 2022-08-17 DIAGNOSIS — F17.210 CIGARETTE SMOKER: ICD-10-CM

## 2022-08-17 PROBLEM — Z87.19 HX OF CROHN'S DISEASE: Status: ACTIVE | Noted: 2022-08-17

## 2022-08-17 PROBLEM — E55.9 VITAMIN D DEFICIENCY: Status: ACTIVE | Noted: 2022-08-17

## 2022-08-17 PROBLEM — Z00.00 MEDICARE ANNUAL WELLNESS VISIT, SUBSEQUENT: Status: ACTIVE | Noted: 2022-08-17

## 2022-08-17 PROBLEM — Z72.0 TOBACCO USER: Chronic | Status: ACTIVE | Noted: 2022-08-17

## 2022-08-17 PROBLEM — R09.02 HYPOXIA: Status: ACTIVE | Noted: 2022-08-17

## 2022-08-17 PROBLEM — M81.0 OSTEOPOROSIS: Status: ACTIVE | Noted: 2022-08-17

## 2022-08-17 PROBLEM — R51.9 HEADACHE: Status: ACTIVE | Noted: 2022-08-17

## 2022-08-17 PROBLEM — Z72.0 TOBACCO USER: Status: ACTIVE | Noted: 2022-08-17

## 2022-08-17 PROBLEM — R56.9 SEIZURE: Status: ACTIVE | Noted: 2022-08-17

## 2022-08-17 PROBLEM — Z23 NEED FOR IMMUNIZATION AGAINST INFLUENZA: Status: ACTIVE | Noted: 2022-08-17

## 2022-08-17 PROCEDURE — 3078F PR MOST RECENT DIASTOLIC BLOOD PRESSURE < 80 MM HG: ICD-10-PCS | Mod: CPTII,,, | Performed by: FAMILY MEDICINE

## 2022-08-17 PROCEDURE — 3074F PR MOST RECENT SYSTOLIC BLOOD PRESSURE < 130 MM HG: ICD-10-PCS | Mod: CPTII,,, | Performed by: FAMILY MEDICINE

## 2022-08-17 PROCEDURE — 3008F PR BODY MASS INDEX (BMI) DOCUMENTED: ICD-10-PCS | Mod: CPTII,,, | Performed by: FAMILY MEDICINE

## 2022-08-17 PROCEDURE — 1159F PR MEDICATION LIST DOCUMENTED IN MEDICAL RECORD: ICD-10-PCS | Mod: CPTII,,, | Performed by: FAMILY MEDICINE

## 2022-08-17 PROCEDURE — 1160F RVW MEDS BY RX/DR IN RCRD: CPT | Mod: CPTII,,, | Performed by: FAMILY MEDICINE

## 2022-08-17 PROCEDURE — 3074F SYST BP LT 130 MM HG: CPT | Mod: CPTII,,, | Performed by: FAMILY MEDICINE

## 2022-08-17 PROCEDURE — 99214 PR OFFICE/OUTPT VISIT, EST, LEVL IV, 30-39 MIN: ICD-10-PCS | Mod: ,,, | Performed by: FAMILY MEDICINE

## 2022-08-17 PROCEDURE — 99214 OFFICE O/P EST MOD 30 MIN: CPT | Mod: ,,, | Performed by: FAMILY MEDICINE

## 2022-08-17 PROCEDURE — 3008F BODY MASS INDEX DOCD: CPT | Mod: CPTII,,, | Performed by: FAMILY MEDICINE

## 2022-08-17 PROCEDURE — 4010F PR ACE/ARB THEARPY RXD/TAKEN: ICD-10-PCS | Mod: CPTII,,, | Performed by: FAMILY MEDICINE

## 2022-08-17 PROCEDURE — 1159F MED LIST DOCD IN RCRD: CPT | Mod: CPTII,,, | Performed by: FAMILY MEDICINE

## 2022-08-17 PROCEDURE — 3078F DIAST BP <80 MM HG: CPT | Mod: CPTII,,, | Performed by: FAMILY MEDICINE

## 2022-08-17 PROCEDURE — 1160F PR REVIEW ALL MEDS BY PRESCRIBER/CLIN PHARMACIST DOCUMENTED: ICD-10-PCS | Mod: CPTII,,, | Performed by: FAMILY MEDICINE

## 2022-08-17 PROCEDURE — 4010F ACE/ARB THERAPY RXD/TAKEN: CPT | Mod: CPTII,,, | Performed by: FAMILY MEDICINE

## 2022-08-17 RX ORDER — KETOROLAC TROMETHAMINE 10 MG/1
10 TABLET, FILM COATED ORAL EVERY 8 HOURS PRN
Qty: 30 TABLET | Refills: 0 | Status: SHIPPED | OUTPATIENT
Start: 2022-08-17 | End: 2022-08-27

## 2022-08-17 RX ORDER — LISINOPRIL AND HYDROCHLOROTHIAZIDE 20; 25 MG/1; MG/1
1 TABLET ORAL DAILY
COMMUNITY
Start: 2022-08-16 | End: 2022-08-17 | Stop reason: SDUPTHER

## 2022-08-17 RX ORDER — OXCARBAZEPINE 600 MG/1
2 TABLET, FILM COATED ORAL NIGHTLY
COMMUNITY
Start: 2022-07-19 | End: 2023-01-19

## 2022-08-17 RX ORDER — ALBUTEROL SULFATE 90 UG/1
1-2 AEROSOL, METERED RESPIRATORY (INHALATION) EVERY 6 HOURS PRN
Qty: 18 G | Refills: 3 | Status: SHIPPED | OUTPATIENT
Start: 2022-08-17 | End: 2022-12-29 | Stop reason: SDUPTHER

## 2022-08-17 RX ORDER — TIZANIDINE 2 MG/1
1 TABLET ORAL EVERY 6 HOURS PRN
COMMUNITY
Start: 2022-07-12 | End: 2022-08-17 | Stop reason: SDUPTHER

## 2022-08-17 RX ORDER — ALBUTEROL SULFATE 0.83 MG/ML
2.5 SOLUTION RESPIRATORY (INHALATION) 4 TIMES DAILY PRN
Qty: 180 ML | Refills: 0 | Status: SHIPPED | OUTPATIENT
Start: 2022-08-17 | End: 2022-11-23

## 2022-08-17 RX ORDER — CYANOCOBALAMIN 1000 UG/ML
1000 INJECTION, SOLUTION INTRAMUSCULAR; SUBCUTANEOUS
COMMUNITY
Start: 2022-08-11

## 2022-08-17 RX ORDER — BUDESONIDE AND FORMOTEROL FUMARATE DIHYDRATE 160; 4.5 UG/1; UG/1
2 AEROSOL RESPIRATORY (INHALATION) 2 TIMES DAILY
COMMUNITY
Start: 2021-10-04 | End: 2022-10-13 | Stop reason: SDUPTHER

## 2022-08-17 RX ORDER — KETOROLAC TROMETHAMINE 10 MG/1
1 TABLET, FILM COATED ORAL EVERY 8 HOURS PRN
COMMUNITY
Start: 2022-06-07 | End: 2022-08-17 | Stop reason: SDUPTHER

## 2022-08-17 RX ORDER — BUSPIRONE HYDROCHLORIDE 5 MG/1
5 TABLET ORAL 3 TIMES DAILY PRN
COMMUNITY
Start: 2022-04-12 | End: 2022-11-23

## 2022-08-17 RX ORDER — ERGOCALCIFEROL 1.25 MG/1
50000 CAPSULE ORAL
COMMUNITY
Start: 2022-07-20

## 2022-08-17 RX ORDER — TIZANIDINE 2 MG/1
2 TABLET ORAL EVERY 8 HOURS
Qty: 270 TABLET | Refills: 1 | Status: SHIPPED | OUTPATIENT
Start: 2022-08-17 | End: 2023-02-13

## 2022-08-17 RX ORDER — TIOTROPIUM BROMIDE 18 UG/1
18 CAPSULE ORAL; RESPIRATORY (INHALATION) DAILY
Qty: 90 CAPSULE | Refills: 1 | Status: SHIPPED | OUTPATIENT
Start: 2022-08-17 | End: 2023-02-23 | Stop reason: SDUPTHER

## 2022-08-17 RX ORDER — LISINOPRIL AND HYDROCHLOROTHIAZIDE 20; 25 MG/1; MG/1
1 TABLET ORAL DAILY
Qty: 90 TABLET | Refills: 1 | Status: SHIPPED | OUTPATIENT
Start: 2022-08-17 | End: 2023-04-24

## 2022-08-17 NOTE — PROGRESS NOTES
Subjective:      Patient ID: Judy Vides is a 62 y.o. female.    Chief Complaint: Follow-up    Follow-up for maintenance of chronic medical conditions, specifically COPD and HTN.      Problem List Items Addressed This Visit     Chronic obstructive pulmonary disease (Chronic)    Relevant Medications    albuterol (PROVENTIL) 2.5 mg /3 mL (0.083 %) nebulizer solution    albuterol (PROVENTIL/VENTOLIN HFA) 90 mcg/actuation inhaler    tiotropium (SPIRIVA WITH HANDIHALER) 18 mcg inhalation capsule    Generalized anxiety disorder (Chronic)    Hypertension - Primary (Chronic)    Relevant Medications    lisinopriL-hydrochlorothiazide (PRINZIDE,ZESTORETIC) 20-25 mg Tab    Neck pain (Chronic)    Relevant Medications    ketorolac (TORADOL) 10 mg tablet    tiZANidine (ZANAFLEX) 2 MG tablet      Other Visit Diagnoses     Cigarette smoker        Relevant Orders    Ambulatory referral/consult to Smoking Cessation Program    Screening for lung cancer        Lung cancer screening declined by patient        Need for hepatitis C screening test        Relevant Orders    Hepatitis C Antibody          The patient's Health Maintenance was reviewed and the following appears to be due:   Health Maintenance Due   Topic Date Due    Hepatitis C Screening  Never done    Cervical Cancer Screening  Never done    HIV Screening  Never done    TETANUS VACCINE  Never done    Mammogram  Never done    Colorectal Cancer Screening  Never done    LDCT Lung Screen  Never done    Shingles Vaccine (1 of 2) Never done    COVID-19 Vaccine (4 - Booster for Moderna series) 05/24/2022       Past Medical History:  Past Medical History:   Diagnosis Date    Anxiety     COPD (chronic obstructive pulmonary disease)     Crohn disease     Epilepsy     Hypertension     Osteoporosis      Past Surgical History:   Procedure Laterality Date    APPENDECTOMY      EYE SURGERY       Review of patient's allergies indicates:  No Known Allergies  Current  Outpatient Medications on File Prior to Visit   Medication Sig Dispense Refill    budesonide-formoterol 160-4.5 mcg (SYMBICORT) 160-4.5 mcg/actuation HFAA Inhale 2 puffs into the lungs 2 (two) times a day.      busPIRone (BUSPAR) 5 MG Tab Take 5 mg by mouth 3 (three) times daily as needed for Anxiety.      cyanocobalamin 1,000 mcg/mL injection Inject 1,000 mcg into the muscle every 7 days.      ergocalciferol (ERGOCALCIFEROL) 50,000 unit Cap Take 50,000 Units by mouth every 7 days.      OXcarbazepine (TRILEPTAL) 600 MG Tab Take 2 tablets by mouth every evening.      [DISCONTINUED] albuterol (PROVENTIL) 2.5 mg /3 mL (0.083 %) nebulizer solution USE 1 VIAL IN NEBULIZER EVERY 6 HOURS 180 mL 0    [DISCONTINUED] albuterol (PROVENTIL/VENTOLIN HFA) 90 mcg/actuation inhaler INHALE 2 PUFFS BY MOUTH EVERY 6 HOURS AS NEEDED FOR SHORTNESS OF BREATH FOR WHEEZING 9 g 0    [DISCONTINUED] ketorolac (TORADOL) 10 mg tablet Take 1 tablet by mouth every 8 (eight) hours as needed for Pain.      [DISCONTINUED] lisinopriL-hydrochlorothiazide (PRINZIDE,ZESTORETIC) 20-25 mg Tab Take 1 tablet by mouth once daily.      [DISCONTINUED] SPIRIVA WITH HANDIHALER 18 mcg inhalation capsule INHALE 1 PUFF BY MOUTH ONCE DAILY FOR 30 DAYS 30 capsule 0    [DISCONTINUED] tiZANidine (ZANAFLEX) 2 MG tablet Take 1 tablet by mouth every 6 (six) hours as needed for Muscle spasms.       No current facility-administered medications on file prior to visit.     Social History     Socioeconomic History    Marital status: Single    Number of children: 0   Occupational History    Occupation: [employer] Employee    Tobacco Use    Smoking status: Current Every Day Smoker     Packs/day: 0.50     Years: 50.00     Pack years: 25.00     Types: Cigarettes    Smokeless tobacco: Never Used   Substance and Sexual Activity    Alcohol use: Not Currently    Drug use: Not Currently     Family History   Problem Relation Age of Onset    Heart disease Mother      "Emphysema Father        Review of Systems   All other systems reviewed and are negative.      Objective:   /77 (BP Location: Right arm, Patient Position: Sitting, BP Method: Large (Automatic))   Pulse 73   Temp 97.7 °F (36.5 °C) (Temporal)   Resp 16   Ht 5' 2" (1.575 m)   Wt 48.5 kg (107 lb)   SpO2 95%   BMI 19.57 kg/m²     Physical Exam  Vitals and nursing note reviewed.   Constitutional:       Appearance: Normal appearance. She is normal weight.   HENT:      Head: Normocephalic and atraumatic.      Right Ear: Tympanic membrane, ear canal and external ear normal.      Left Ear: Tympanic membrane, ear canal and external ear normal.      Nose: Nose normal.      Mouth/Throat:      Mouth: Mucous membranes are moist.      Pharynx: Oropharynx is clear.   Eyes:      Extraocular Movements: Extraocular movements intact.      Conjunctiva/sclera: Conjunctivae normal.      Pupils: Pupils are equal, round, and reactive to light.   Cardiovascular:      Rate and Rhythm: Normal rate and regular rhythm.      Pulses: Normal pulses.      Heart sounds: Normal heart sounds.   Pulmonary:      Effort: Pulmonary effort is normal.      Breath sounds: Normal breath sounds.   Abdominal:      General: Abdomen is flat. Bowel sounds are normal.      Palpations: Abdomen is soft.   Musculoskeletal:         General: Normal range of motion.      Cervical back: Normal range of motion and neck supple.   Skin:     General: Skin is warm and dry.      Capillary Refill: Capillary refill takes less than 2 seconds.   Neurological:      General: No focal deficit present.      Mental Status: She is alert and oriented to person, place, and time. Mental status is at baseline.   Psychiatric:         Mood and Affect: Mood normal.         Behavior: Behavior normal.         Thought Content: Thought content normal.         Judgment: Judgment normal.         Lab Requisition on 08/12/2022   Component Date Value Ref Range Status    Calprotectin, F " 08/12/2022 61.2 (A) <50.0 (Normal) mcg/g Final    Interpretation: Borderline (50.0-120 mcg/g)     -------------------ADDITIONAL INFORMATION-------------------  On 12/16/2021, [facility] implemented a new   fecal calprotectin method with an expanded measuring range.   If patient was tested on previous method and is undergoing   serial monitoring, rebaselining may be indicated.   Rebaselining, or testing the current sample on the previous   method, is available at no charge, subject to reagent   availability. The rebaseline result will be added to this   report. Contact [facility] [employer] at [phone]   within 7 days of initial report issuance to request this   service. For [facility] patients, call [phone].     Test Performed by:  [facility] [place] [address]  : Kemal Smith M.D. Ph.D.; CLIA# [id number]   Lab Visit on 07/22/2022   Component Date Value Ref Range Status    Color, UA 07/22/2022 Yellow  Yellow, Colorless, Other, Clear Final    Appearance, UA 07/22/2022 Clear  Clear Final    Specific Gravity, UA 07/22/2022 1.018  1.001 - 1.030 Final    pH, UA 07/22/2022 7.0  5.0, 5.5, 6.0, 6.5, 7.0, 7.5, 8.0, 8.5 Final    Protein, UA 07/22/2022 Negative  Negative, 300  mg/dL Final    Glucose, UA 07/22/2022 Negative  Negative, Normal mg/dL Final    Ketones, UA 07/22/2022 Negative  Negative, +1, +2, +3, +4, +5, >=160, >=80 mg/dL Final    Blood, UA 07/22/2022 Negative  Negative unit/L Final    Bilirubin, UA 07/22/2022 Negative  Negative mg/dL Final    Urobilinogen, UA 07/22/2022 0.2  0.2, 1.0, Normal mg/dL Final    Nitrites, UA 07/22/2022 Negative  Negative Final    Leukocyte Esterase, UA 07/22/2022 Negative  Negative, 75  unit/L Final    QuantiFERON-Tb Gold Plus Result 07/22/2022 Negative  Negative Final    No interferon-gamma response to M. tuberculosis   antigens was detected. Latent infection  with   M. tuberculosis is unlikely. A single negative result   does not exclude infection with M. tuberculosis.   In patients at high risk for M.tuberculosis infection,   a second test should be considered in accordance   with the 2017 ATS/IDSA/CDC Clinical Practice Guidelines  for Diagnosis of Tuberculosis in Adults and Children  [Santiagon VESNA et. al. Clin. Infect. Dis.   2017;64(2):111-115].     The reference range for the 'TB1 Ag minus Nil Result'  and 'TB2 Ag minus Nil Result' is an Interferon-gamma   level <0.35 IU/mL.    TB1 Ag minus Nil Result 07/22/2022 0.00  IU/mL Final    TB2 Ag minus Nil Result 07/22/2022 0.00  IU/mL Final    Mitogen minus Nil Result 07/22/2022 8.21  IU/mL Final    Nil Result 07/22/2022 0.01  IU/mL Final       Test Performed by:  [place] [address]  : Kemal Smith M.D. Ph.D.; CLIA# [id number]    Adalimumab QN with Reflex to Ab 07/22/2022 <0.8 (A) mcg/mL Final    For concentrations of adalimumab less than or equal to   8.0 mcg/mL, reflex testing for antibodies-to-adalimumab   will be performed.     -------------------REFERENCE VALUE--------------------------  Limit of Quantitation = 0.8 mcg/mL     -------------------ADDITIONAL INFORMATION-------------------  This test was developed and its performance characteristics   determined by [facility] in a manner consistent with   CLIA requirements. This test has not been cleared or   approved by the U.S. Food and Drug Administration.     Test Performed by:  [place] [address]  : Kemal Smith M.D. Ph.D.; CLIA# [id number]    RBC, UA 07/22/2022 <5  <=5 /HPF Final    WBC, UA 07/22/2022 <5  <=5 /HPF Final    Squamous Epithelial Cells, UA 07/22/2022 <5  <=5 /HPF Final    Bacteria, UA 07/22/2022 None Seen  None Seen, Rare, Occasional /HPF Final     Adalimumab Ab, S 07/22/2022 <10.0  <14.0 AU/mL Final    Absence of detectable antibody-to-adalimumab. Low   concentration of adalimumab may be attributable to other   parameters related to adalimumab clearance.     -------------------ADDITIONAL INFORMATION-------------------  This test was developed and its performance characteristics   determined by [facility] in a manner consistent with   CLIA requirements. This test has not been cleared or   approved by the U.S. Food and Drug Administration.     Test Performed by:  [facility]  [address] [place] [address]  : Kemal Smith M.D. Ph.D.; CLIA# [id number]   Lab Visit on 07/15/2022   Component Date Value Ref Range Status    C-Reactive Protein High Sensitivity 07/15/2022 7.89 (A) <=5.00 mg/L Final    Vitamin B12 Level 07/15/2022 716  213 - 816 pg/mL Final    Vit D 25 OH 07/15/2022 24.6 (A) 30.0 - 80.0 ng/mL Final    Thyroid Stimulating Hormone 07/15/2022 0.7119  0.3500 - 4.9400 uIU/mL Final    Cholesterol Total 07/15/2022 198  <=200 mg/dL Final    HDL Cholesterol 07/15/2022 67 (A) 35 - 60 mg/dL Final    Triglyceride 07/15/2022 46  37 - 140 mg/dL Final    Cholesterol/HDL Ratio 07/15/2022 3  0 - 5 Final    Very Low Density Lipoprotein 07/15/2022 9   Final    LDL Cholesterol 07/15/2022 122.00  50.00 - 140.00 mg/dL Final    Sodium Level 07/15/2022 142  136 - 145 mmol/L Final    Potassium Level 07/15/2022 4.9  3.5 - 5.1 mmol/L Final    Chloride 07/15/2022 106  98 - 107 mmol/L Final    Carbon Dioxide 07/15/2022 27  23 - 31 mmol/L Final    Glucose Level 07/15/2022 95  82 - 115 mg/dL Final    Blood Urea Nitrogen 07/15/2022 17.2  9.8 - 20.1 mg/dL Final    Creatinine 07/15/2022 0.62  0.55 - 1.02 mg/dL Final    Calcium Level Total 07/15/2022 9.3  8.4 - 10.2 mg/dL Final    Protein Total 07/15/2022 6.2  5.8 - 7.6 gm/dL Final    Albumin Level 07/15/2022 3.8  3.4 - 4.8 gm/dL Final    Globulin  07/15/2022 2.4  2.4 - 3.5 gm/dL Final    Albumin/Globulin Ratio 07/15/2022 1.6  1.1 - 2.0 ratio Final    Bilirubin Total 07/15/2022 0.2  <=1.5 mg/dL Final    Alkaline Phosphatase 07/15/2022 90  40 - 150 unit/L Final    Alanine Aminotransferase 07/15/2022 12  0 - 55 unit/L Final    Aspartate Aminotransferase 07/15/2022 13  5 - 34 unit/L Final    Estimated GFR-Non  07/15/2022 >60  mls/min/1.73/m2 Final    WBC 07/15/2022 8.6  4.5 - 11.5 x10(3)/mcL Final    RBC 07/15/2022 4.42  4.20 - 5.40 x10(6)/mcL Final    Hgb 07/15/2022 14.2  12.0 - 16.0 gm/dL Final    Hct 07/15/2022 43.8  37.0 - 47.0 % Final    MCV 07/15/2022 99.1 (A) 80.0 - 94.0 fL Final    MCH 07/15/2022 32.1 (A) 27.0 - 31.0 pg Final    MCHC 07/15/2022 32.4 (A) 33.0 - 36.0 mg/dL Final    RDW 07/15/2022 14.0  11.5 - 17.0 % Final    Platelet 07/15/2022 505 (A) 130 - 400 x10(3)/mcL Final    MPV 07/15/2022 10.5 (A) 7.4 - 10.4 fL Final    Neut % 07/15/2022 76.2  % Final    Lymph % 07/15/2022 14.1  % Final    Mono % 07/15/2022 6.0  % Final    Eos % 07/15/2022 2.4  % Final    Basophil % 07/15/2022 1.0  % Final    Lymph # 07/15/2022 1.22  0.6 - 4.6 x10(3)/mcL Final    Neut # 07/15/2022 6.6  2.1 - 9.2 x10(3)/mcL Final    Mono # 07/15/2022 0.52  0.1 - 1.3 x10(3)/mcL Final    Eos # 07/15/2022 0.21  0 - 0.9 x10(3)/mcL Final    Baso # 07/15/2022 0.09  0 - 0.2 x10(3)/mcL Final    IG# 07/15/2022 0.03  0 - 0.04 x10(3)/mcL Final    IG% 07/15/2022 0.3  % Final    NRBC% 07/15/2022 0.0  % Final       X-Ray Chest PA Lateral With Lordotic Vie  Clinical History  Crohn's disease     Technique  2 views of the chest.     Comparison  8/13/2020     Findings  Lungs are clear with no visualized focal airspace opacity.  The trachea appears midline.  The cardiomediastinal silhouette is within normal limits.  There is no evidence of pneumothorax or pleural effusion.  Visualized abdomen, soft tissues, and osseous structures are  unremarkable.      Impression  No acute cardiopulmonary process.        Electronically Signed By: Kemal Horton MD  Date/Time Signed: 01/19/2022 08:38       Assessment:     1. Primary hypertension    2. Chronic obstructive pulmonary disease, unspecified COPD type    3. Generalized anxiety disorder    4. Neck pain    5. Cigarette smoker    6. Screening for lung cancer    7. Lung cancer screening declined by patient    8. Need for hepatitis C screening test      Plan:   I have changed Judy Vides's albuterol, albuterol, ketorolac, SPIRIVA WITH HANDIHALER, and tiZANidine. I am also having her maintain her budesonide-formoterol 160-4.5 mcg, busPIRone, cyanocobalamin, OXcarbazepine, ergocalciferol, and lisinopriL-hydrochlorothiazide.  Problem List Items Addressed This Visit     Chronic obstructive pulmonary disease (Chronic)    Relevant Medications    albuterol (PROVENTIL) 2.5 mg /3 mL (0.083 %) nebulizer solution    albuterol (PROVENTIL/VENTOLIN HFA) 90 mcg/actuation inhaler    tiotropium (SPIRIVA WITH HANDIHALER) 18 mcg inhalation capsule    Generalized anxiety disorder (Chronic)    Hypertension - Primary (Chronic)    Relevant Medications    lisinopriL-hydrochlorothiazide (PRINZIDE,ZESTORETIC) 20-25 mg Tab    Neck pain (Chronic)    Relevant Medications    ketorolac (TORADOL) 10 mg tablet    tiZANidine (ZANAFLEX) 2 MG tablet      Other Visit Diagnoses     Cigarette smoker        Relevant Orders    Ambulatory referral/consult to Smoking Cessation Program    Screening for lung cancer        Lung cancer screening declined by patient        Need for hepatitis C screening test        Relevant Orders    Hepatitis C Antibody        Follow up for colon cancer screening report from Dr. Jimenez, Pap report from Dr. Snow.    Judy was seen today for follow-up.    Diagnoses and all orders for this visit:    Primary hypertension  -     lisinopriL-hydrochlorothiazide (PRINZIDE,ZESTORETIC) 20-25 mg Tab; Take 1 tablet by mouth once  daily.  - Continue current prescription medications. Refills as needed   Condition/Symptoms controlled   RTC 6 months (as scheduled) or PRN    Chronic obstructive pulmonary disease, unspecified COPD type  -     albuterol (PROVENTIL) 2.5 mg /3 mL (0.083 %) nebulizer solution; Take 3 mLs (2.5 mg total) by nebulization 4 (four) times daily as needed for Wheezing. Rescue  -     albuterol (PROVENTIL/VENTOLIN HFA) 90 mcg/actuation inhaler; Inhale 1-2 puffs into the lungs every 6 (six) hours as needed for Wheezing. Rescue  -     tiotropium (SPIRIVA WITH HANDIHALER) 18 mcg inhalation capsule; Inhale 1 capsule (18 mcg total) into the lungs once daily at 6am. Controller  - Continue current prescription medications. Refills as needed   Condition/Symptoms controlled   RTC 6 months (as scheduled) or PRN    Generalized anxiety disorder   Continue current prescription medications. Refills as needed   Condition/Symptoms controlled   RTC 6 months (as scheduled) or PRN    Neck pain  -     ketorolac (TORADOL) 10 mg tablet; Take 1 tablet (10 mg total) by mouth every 8 (eight) hours as needed for Pain.  -     tiZANidine (ZANAFLEX) 2 MG tablet; Take 1 tablet (2 mg total) by mouth every 8 (eight) hours.  - Continue current prescription medications. Refills as needed   Condition/Symptoms controlled   RTC 6 months (as scheduled) or PRN    Cigarette smoker  -     Ambulatory referral/consult to Smoking Cessation Program; Future    Screening for lung cancer  Lung cancer screening declined by patient  Declines at this time, cites cost concerns    Need for hepatitis C screening test  -     Hepatitis C Antibody; Future      Medications Ordered This Encounter   Medications    albuterol (PROVENTIL) 2.5 mg /3 mL (0.083 %) nebulizer solution     Sig: Take 3 mLs (2.5 mg total) by nebulization 4 (four) times daily as needed for Wheezing. Rescue     Dispense:  180 mL     Refill:  0    albuterol (PROVENTIL/VENTOLIN HFA) 90 mcg/actuation inhaler      Sig: Inhale 1-2 puffs into the lungs every 6 (six) hours as needed for Wheezing. Rescue     Dispense:  18 g     Refill:  3    ketorolac (TORADOL) 10 mg tablet     Sig: Take 1 tablet (10 mg total) by mouth every 8 (eight) hours as needed for Pain.     Dispense:  30 tablet     Refill:  0    lisinopriL-hydrochlorothiazide (PRINZIDE,ZESTORETIC) 20-25 mg Tab     Sig: Take 1 tablet by mouth once daily.     Dispense:  90 tablet     Refill:  1     .    tiotropium (SPIRIVA WITH HANDIHALER) 18 mcg inhalation capsule     Sig: Inhale 1 capsule (18 mcg total) into the lungs once daily at 6am. Controller     Dispense:  90 capsule     Refill:  1    tiZANidine (ZANAFLEX) 2 MG tablet     Sig: Take 1 tablet (2 mg total) by mouth every 8 (eight) hours.     Dispense:  270 tablet     Refill:  1     [unfilled]  Orders Placed This Encounter   Procedures    Hepatitis C Antibody     Standing Status:   Future     Standing Expiration Date:   10/16/2023     Order Specific Question:   Release to patient     Answer:   Immediate    Ambulatory referral/consult to Smoking Cessation Program     Standing Status:   Future     Standing Expiration Date:   9/17/2023     Referral Priority:   Routine     Referral Type:   Consultation     Referral Reason:   Specialty Services Required     Requested Specialty:   CTTS     Number of Visits Requested:   1       Medication List with Changes/Refills   Current Medications    BUDESONIDE-FORMOTEROL 160-4.5 MCG (SYMBICORT) 160-4.5 MCG/ACTUATION HFAA    Inhale 2 puffs into the lungs 2 (two) times a day.    BUSPIRONE (BUSPAR) 5 MG TAB    Take 5 mg by mouth 3 (three) times daily as needed for Anxiety.    CYANOCOBALAMIN 1,000 MCG/ML INJECTION    Inject 1,000 mcg into the muscle every 7 days.    ERGOCALCIFEROL (ERGOCALCIFEROL) 50,000 UNIT CAP    Take 50,000 Units by mouth every 7 days.    OXCARBAZEPINE (TRILEPTAL) 600 MG TAB    Take 2 tablets by mouth every evening.   Changed and/or Refilled Medications    Modified  Medication Previous Medication    ALBUTEROL (PROVENTIL) 2.5 MG /3 ML (0.083 %) NEBULIZER SOLUTION albuterol (PROVENTIL) 2.5 mg /3 mL (0.083 %) nebulizer solution       Take 3 mLs (2.5 mg total) by nebulization 4 (four) times daily as needed for Wheezing. Rescue    USE 1 VIAL IN NEBULIZER EVERY 6 HOURS    ALBUTEROL (PROVENTIL/VENTOLIN HFA) 90 MCG/ACTUATION INHALER albuterol (PROVENTIL/VENTOLIN HFA) 90 mcg/actuation inhaler       Inhale 1-2 puffs into the lungs every 6 (six) hours as needed for Wheezing. Rescue    INHALE 2 PUFFS BY MOUTH EVERY 6 HOURS AS NEEDED FOR SHORTNESS OF BREATH FOR WHEEZING    KETOROLAC (TORADOL) 10 MG TABLET ketorolac (TORADOL) 10 mg tablet       Take 1 tablet (10 mg total) by mouth every 8 (eight) hours as needed for Pain.    Take 1 tablet by mouth every 8 (eight) hours as needed for Pain.    LISINOPRIL-HYDROCHLOROTHIAZIDE (PRINZIDE,ZESTORETIC) 20-25 MG TAB lisinopriL-hydrochlorothiazide (PRINZIDE,ZESTORETIC) 20-25 mg Tab       Take 1 tablet by mouth once daily.    Take 1 tablet by mouth once daily.    TIOTROPIUM (SPIRIVA WITH HANDIHALER) 18 MCG INHALATION CAPSULE SPIRIVA WITH HANDIHALER 18 mcg inhalation capsule       Inhale 1 capsule (18 mcg total) into the lungs once daily at 6am. Controller    INHALE 1 PUFF BY MOUTH ONCE DAILY FOR 30 DAYS    TIZANIDINE (ZANAFLEX) 2 MG TABLET tiZANidine (ZANAFLEX) 2 MG tablet       Take 1 tablet (2 mg total) by mouth every 8 (eight) hours.    Take 1 tablet by mouth every 6 (six) hours as needed for Muscle spasms.      Medication List with Changes/Refills   Current Medications    BUDESONIDE-FORMOTEROL 160-4.5 MCG (SYMBICORT) 160-4.5 MCG/ACTUATION HFAA    Inhale 2 puffs into the lungs 2 (two) times a day.       Start Date: 10/4/2021 End Date: 9/18/2024    BUSPIRONE (BUSPAR) 5 MG TAB    Take 5 mg by mouth 3 (three) times daily as needed for Anxiety.       Start Date: 4/12/2022 End Date: --    CYANOCOBALAMIN 1,000 MCG/ML INJECTION    Inject 1,000 mcg into the  muscle every 7 days.       Start Date: 8/11/2022 End Date: --    ERGOCALCIFEROL (ERGOCALCIFEROL) 50,000 UNIT CAP    Take 50,000 Units by mouth every 7 days.       Start Date: 7/20/2022 End Date: --    OXCARBAZEPINE (TRILEPTAL) 600 MG TAB    Take 2 tablets by mouth every evening.       Start Date: 7/19/2022 End Date: --   Changed and/or Refilled Medications    Modified Medication Previous Medication    ALBUTEROL (PROVENTIL) 2.5 MG /3 ML (0.083 %) NEBULIZER SOLUTION albuterol (PROVENTIL) 2.5 mg /3 mL (0.083 %) nebulizer solution       Take 3 mLs (2.5 mg total) by nebulization 4 (four) times daily as needed for Wheezing. Rescue    USE 1 VIAL IN NEBULIZER EVERY 6 HOURS       Start Date: 8/17/2022 End Date: 2/13/2023    Start Date: 7/5/2022  End Date: 8/17/2022    ALBUTEROL (PROVENTIL/VENTOLIN HFA) 90 MCG/ACTUATION INHALER albuterol (PROVENTIL/VENTOLIN HFA) 90 mcg/actuation inhaler       Inhale 1-2 puffs into the lungs every 6 (six) hours as needed for Wheezing. Rescue    INHALE 2 PUFFS BY MOUTH EVERY 6 HOURS AS NEEDED FOR SHORTNESS OF BREATH FOR WHEEZING       Start Date: 8/17/2022 End Date: 2/13/2023    Start Date: 7/28/2022 End Date: 8/17/2022    KETOROLAC (TORADOL) 10 MG TABLET ketorolac (TORADOL) 10 mg tablet       Take 1 tablet (10 mg total) by mouth every 8 (eight) hours as needed for Pain.    Take 1 tablet by mouth every 8 (eight) hours as needed for Pain.       Start Date: 8/17/2022 End Date: 8/27/2022    Start Date: 6/7/2022  End Date: 8/17/2022    LISINOPRIL-HYDROCHLOROTHIAZIDE (PRINZIDE,ZESTORETIC) 20-25 MG TAB lisinopriL-hydrochlorothiazide (PRINZIDE,ZESTORETIC) 20-25 mg Tab       Take 1 tablet by mouth once daily.    Take 1 tablet by mouth once daily.       Start Date: 8/17/2022 End Date: 2/13/2023    Start Date: 8/16/2022 End Date: 8/17/2022    TIOTROPIUM (SPIRIVA WITH HANDIHALER) 18 MCG INHALATION CAPSULE SPIRIVA WITH HANDIHALER 18 mcg inhalation capsule       Inhale 1 capsule (18 mcg total) into the  lungs once daily at 6am. Controller    INHALE 1 PUFF BY MOUTH ONCE DAILY FOR 30 DAYS       Start Date: 8/17/2022 End Date: 2/13/2023    Start Date: 7/5/2022  End Date: 8/17/2022    TIZANIDINE (ZANAFLEX) 2 MG TABLET tiZANidine (ZANAFLEX) 2 MG tablet       Take 1 tablet (2 mg total) by mouth every 8 (eight) hours.    Take 1 tablet by mouth every 6 (six) hours as needed for Muscle spasms.       Start Date: 8/17/2022 End Date: 2/13/2023    Start Date: 7/12/2022 End Date: 8/17/2022

## 2022-10-13 DIAGNOSIS — J45.909 ASTHMA, UNSPECIFIED ASTHMA SEVERITY, UNSPECIFIED WHETHER COMPLICATED, UNSPECIFIED WHETHER PERSISTENT: Primary | ICD-10-CM

## 2022-10-13 RX ORDER — BUDESONIDE AND FORMOTEROL FUMARATE DIHYDRATE 160; 4.5 UG/1; UG/1
2 AEROSOL RESPIRATORY (INHALATION) 2 TIMES DAILY
Qty: 6 G | Refills: 1 | Status: SHIPPED | OUTPATIENT
Start: 2022-10-13 | End: 2022-11-28

## 2022-11-04 ENCOUNTER — OFFICE VISIT (OUTPATIENT)
Dept: ENDOCRINOLOGY | Facility: CLINIC | Age: 62
End: 2022-11-04
Payer: COMMERCIAL

## 2022-11-04 VITALS
DIASTOLIC BLOOD PRESSURE: 98 MMHG | HEART RATE: 76 BPM | SYSTOLIC BLOOD PRESSURE: 146 MMHG | TEMPERATURE: 99 F | BODY MASS INDEX: 19.9 KG/M2 | RESPIRATION RATE: 16 BRPM | HEIGHT: 62 IN | WEIGHT: 108.13 LBS

## 2022-11-04 DIAGNOSIS — M81.0 OSTEOPOROSIS, UNSPECIFIED OSTEOPOROSIS TYPE, UNSPECIFIED PATHOLOGICAL FRACTURE PRESENCE: Primary | ICD-10-CM

## 2022-11-04 DIAGNOSIS — E55.9 VITAMIN D DEFICIENCY: ICD-10-CM

## 2022-11-04 PROCEDURE — 99214 OFFICE O/P EST MOD 30 MIN: CPT | Mod: PBBFAC

## 2022-11-04 RX ORDER — ADALIMUMAB 40MG/0.4ML
KIT SUBCUTANEOUS
COMMUNITY
Start: 2022-10-31

## 2022-11-04 RX ORDER — ACETAMINOPHEN AND CODEINE PHOSPHATE 300; 30 MG/1; MG/1
1-2 TABLET ORAL
COMMUNITY
Start: 2022-09-14 | End: 2023-08-24

## 2022-11-04 RX ORDER — OFLOXACIN 3 MG/ML
1 SOLUTION/ DROPS OPHTHALMIC 4 TIMES DAILY
COMMUNITY
Start: 2022-09-14 | End: 2023-08-24

## 2022-11-04 NOTE — PROGRESS NOTES
Subjective:       Patient ID: Judy Vides is a 62 y.o. female.    Chief Complaint: Osteoporosis    Pt is a 63 y/o w/ Pmhx of osteoporosis, IBD here for f/u.  Have been following w/ most recent tx reclast x3.  Last tx was roughly 18 months ago.  Has comorbid vit d def as well.  Labs 7/22 showed nl renal fxn, vit d in the 20s.  Pt states PCP has kept pt on ergo weekly to bump up level and is monitoring.  Pt denies falls/fx.  Open to further DXA and tx accordingly.    Review of Systems      Objective:      Physical Exam  Vitals reviewed.   Constitutional:       Appearance: Normal appearance. She is normal weight.   HENT:      Head: Normocephalic and atraumatic.   Neurological:      Mental Status: She is alert.   Psychiatric:         Mood and Affect: Mood normal.       Assessment:       Problem List Items Addressed This Visit          Endocrine    Vitamin D deficiency    Relevant Orders    DXA Bone Density Spine And Hip       Orthopedic    Osteoporosis - Primary    Relevant Orders    DXA Bone Density Spine And Hip         Plan:       Osteoporosis, unspecified osteoporosis type, unspecified pathological fracture presence  DXA to sort further care  -     DXA Bone Density Spine And Hip; Future; Expected date: 11/04/2022    Vitamin D deficiency  Vit d was low but addressed by PCP, value was acceptable to continue reclast should it be warranted.  Will defer further monitoring and tx of vit d to PCP given I can see labs in Epic.  -     DXA Bone Density Spine And Hip; Future; Expected date: 11/04/2022      F/u in 1 year or earlier should signs/symptoms warrant.

## 2022-11-08 ENCOUNTER — LAB VISIT (OUTPATIENT)
Dept: LAB | Facility: HOSPITAL | Age: 62
End: 2022-11-08
Attending: NURSE PRACTITIONER
Payer: COMMERCIAL

## 2022-11-08 DIAGNOSIS — Z79.899 ENCOUNTER FOR LONG-TERM (CURRENT) USE OF OTHER MEDICATIONS: Primary | ICD-10-CM

## 2022-11-08 DIAGNOSIS — Z51.81 ENCOUNTER FOR THERAPEUTIC DRUG MONITORING: ICD-10-CM

## 2022-11-08 DIAGNOSIS — K50.80 REGIONAL ENTERITIS OF SMALL INTESTINE WITH LARGE INTESTINE: ICD-10-CM

## 2022-11-08 DIAGNOSIS — E55.9 AVITAMINOSIS D: ICD-10-CM

## 2022-11-08 LAB
BASOPHILS # BLD AUTO: 0.08 X10(3)/MCL (ref 0–0.2)
BASOPHILS NFR BLD AUTO: 0.8 %
CRP SERPL-MCNC: 4.1 MG/L
DEPRECATED CALCIDIOL+CALCIFEROL SERPL-MC: 22.8 NG/ML (ref 30–80)
EOSINOPHIL # BLD AUTO: 0.19 X10(3)/MCL (ref 0–0.9)
EOSINOPHIL NFR BLD AUTO: 1.8 %
ERYTHROCYTE [DISTWIDTH] IN BLOOD BY AUTOMATED COUNT: 13.9 % (ref 11.5–17)
HCT VFR BLD AUTO: 48.5 % (ref 37–47)
HGB BLD-MCNC: 15.9 GM/DL (ref 12–16)
IMM GRANULOCYTES # BLD AUTO: 0.04 X10(3)/MCL (ref 0–0.04)
IMM GRANULOCYTES NFR BLD AUTO: 0.4 %
LYMPHOCYTES # BLD AUTO: 1.8 X10(3)/MCL (ref 0.6–4.6)
LYMPHOCYTES NFR BLD AUTO: 17.5 %
MCH RBC QN AUTO: 32 PG (ref 27–31)
MCHC RBC AUTO-ENTMCNC: 32.8 MG/DL (ref 33–36)
MCV RBC AUTO: 97.6 FL (ref 80–94)
MONOCYTES # BLD AUTO: 0.52 X10(3)/MCL (ref 0.1–1.3)
MONOCYTES NFR BLD AUTO: 5.1 %
NEUTROPHILS # BLD AUTO: 7.7 X10(3)/MCL (ref 2.1–9.2)
NEUTROPHILS NFR BLD AUTO: 74.4 %
NRBC BLD AUTO-RTO: 0 %
PLATELET # BLD AUTO: 466 X10(3)/MCL (ref 130–400)
PMV BLD AUTO: 10.5 FL (ref 7.4–10.4)
RBC # BLD AUTO: 4.97 X10(6)/MCL (ref 4.2–5.4)
WBC # SPEC AUTO: 10.3 X10(3)/MCL (ref 4.5–11.5)

## 2022-11-08 PROCEDURE — 82306 VITAMIN D 25 HYDROXY: CPT

## 2022-11-08 PROCEDURE — 85025 COMPLETE CBC W/AUTO DIFF WBC: CPT

## 2022-11-08 PROCEDURE — 86140 C-REACTIVE PROTEIN: CPT

## 2022-11-08 PROCEDURE — 36415 COLL VENOUS BLD VENIPUNCTURE: CPT

## 2022-11-08 PROCEDURE — 80145 DRUG ASSAY ADALIMUMAB: CPT

## 2022-11-11 LAB — ADALIMUMAB SERPL IA-MCNC: 12.3 MCG/ML

## 2022-11-15 DIAGNOSIS — J44.9 CHRONIC OBSTRUCTIVE PULMONARY DISEASE, UNSPECIFIED COPD TYPE: Primary | ICD-10-CM

## 2022-11-15 RX ORDER — PEN NEEDLE, DIABETIC 31 GX5/16"
1 NEEDLE, DISPOSABLE MISCELLANEOUS DAILY PRN
Qty: 1 EACH | Refills: 0 | Status: SHIPPED | OUTPATIENT
Start: 2022-11-15 | End: 2023-08-24 | Stop reason: SDUPTHER

## 2022-11-15 NOTE — TELEPHONE ENCOUNTER
----- Message from Aurelia Navarrete sent at 11/11/2022  9:30 AM CST -----  Regarding: Rx Request  Type:  Patient Call    Who Called: pt  Who Left Message for Patient: Dr. Phillips  Does the patient know what this is regarding?: yes  Would the patient rather a call back or a response via [facility]? Call back  Best Call Back Number: [phone]  Additional Information:  patient requesting a new nebulizer, stated the one she has is about 5 years old - [facility] in [place] is the preferred pharmacy

## 2022-11-16 ENCOUNTER — TELEPHONE (OUTPATIENT)
Dept: FAMILY MEDICINE | Facility: CLINIC | Age: 62
End: 2022-11-16
Payer: COMMERCIAL

## 2022-11-16 NOTE — TELEPHONE ENCOUNTER
----- Message from Ani Aviles sent at 11/16/2022  2:54 PM CST -----  Regarding: Meds  .Type:  Needs Medical Advice    Who Called: PT  Symptoms (please be specific):    How long has patient had these symptoms:    Pharmacy name and phone #:    Would the patient rather a call back or a response via [facility]? Call back  Best Call Back Number: [phone]  Additional Information: Pt did receive nebulizer and wanted to thank nurse and Dr Phillips.

## 2022-11-21 PROBLEM — Z00.00 MEDICARE ANNUAL WELLNESS VISIT, SUBSEQUENT: Status: RESOLVED | Noted: 2022-08-17 | Resolved: 2022-11-21

## 2022-11-22 DIAGNOSIS — J44.9 CHRONIC OBSTRUCTIVE PULMONARY DISEASE, UNSPECIFIED COPD TYPE: Chronic | ICD-10-CM

## 2022-11-23 RX ORDER — ALBUTEROL SULFATE 0.83 MG/ML
SOLUTION RESPIRATORY (INHALATION)
Qty: 180 ML | Refills: 0 | Status: SHIPPED | OUTPATIENT
Start: 2022-11-23 | End: 2023-03-31

## 2022-11-23 RX ORDER — ALBUTEROL SULFATE 0.83 MG/ML
SOLUTION RESPIRATORY (INHALATION)
Qty: 180 ML | Refills: 0 | Status: SHIPPED | OUTPATIENT
Start: 2022-11-23 | End: 2023-02-15

## 2022-11-27 DIAGNOSIS — J45.909 ASTHMA, UNSPECIFIED ASTHMA SEVERITY, UNSPECIFIED WHETHER COMPLICATED, UNSPECIFIED WHETHER PERSISTENT: ICD-10-CM

## 2022-11-28 RX ORDER — BUDESONIDE AND FORMOTEROL FUMARATE DIHYDRATE 160; 4.5 UG/1; UG/1
AEROSOL RESPIRATORY (INHALATION)
Qty: 11 G | Refills: 0 | Status: SHIPPED | OUTPATIENT
Start: 2022-11-28 | End: 2022-12-19

## 2022-12-15 ENCOUNTER — HOSPITAL ENCOUNTER (OUTPATIENT)
Dept: RADIOLOGY | Facility: HOSPITAL | Age: 62
Discharge: HOME | End: 2022-12-15
Attending: OBSTETRICS & GYNECOLOGY
Payer: COMMERCIAL

## 2022-12-15 ENCOUNTER — HOSPITAL ENCOUNTER (OUTPATIENT)
Dept: RADIOLOGY | Facility: HOSPITAL | Age: 62
Discharge: HOME | End: 2022-12-15
Attending: INTERNAL MEDICINE
Payer: COMMERCIAL

## 2022-12-15 DIAGNOSIS — M81.0 OSTEOPOROSIS, UNSPECIFIED OSTEOPOROSIS TYPE, UNSPECIFIED PATHOLOGICAL FRACTURE PRESENCE: ICD-10-CM

## 2022-12-15 DIAGNOSIS — Z12.31 BREAST CANCER SCREENING BY MAMMOGRAM: ICD-10-CM

## 2022-12-15 DIAGNOSIS — E55.9 VITAMIN D DEFICIENCY: ICD-10-CM

## 2022-12-15 DIAGNOSIS — M81.0 AGE-RELATED OSTEOPOROSIS WITHOUT CURRENT PATHOLOGICAL FRACTURE: Primary | ICD-10-CM

## 2022-12-15 DIAGNOSIS — M81.0 OSTEOPOROSIS, UNSPECIFIED OSTEOPOROSIS TYPE, UNSPECIFIED PATHOLOGICAL FRACTURE PRESENCE: Primary | ICD-10-CM

## 2022-12-15 PROCEDURE — 77080 DXA BONE DENSITY AXIAL: CPT | Mod: TC

## 2022-12-15 PROCEDURE — 77067 MAMMO DIGITAL SCREENING BILAT WITH TOMO: ICD-10-PCS | Mod: 26,,, | Performed by: RADIOLOGY

## 2022-12-15 PROCEDURE — 77067 SCR MAMMO BI INCL CAD: CPT | Mod: TC

## 2022-12-15 PROCEDURE — 77067 SCR MAMMO BI INCL CAD: CPT | Mod: 26,,, | Performed by: RADIOLOGY

## 2022-12-15 PROCEDURE — 77063 MAMMO DIGITAL SCREENING BILAT WITH TOMO: ICD-10-PCS | Mod: 26,,, | Performed by: RADIOLOGY

## 2022-12-15 PROCEDURE — 77063 BREAST TOMOSYNTHESIS BI: CPT | Mod: 26,,, | Performed by: RADIOLOGY

## 2022-12-15 RX ORDER — ALENDRONATE SODIUM 70 MG/1
70 TABLET ORAL
Qty: 4 TABLET | Refills: 11 | Status: SHIPPED | OUTPATIENT
Start: 2022-12-15 | End: 2023-01-19

## 2022-12-16 NOTE — TELEPHONE ENCOUNTER
Attempted to reach patient again, no answer, left voicemail for patient to contact the clinic.  Letter also mailed.

## 2022-12-18 DIAGNOSIS — J45.909 ASTHMA, UNSPECIFIED ASTHMA SEVERITY, UNSPECIFIED WHETHER COMPLICATED, UNSPECIFIED WHETHER PERSISTENT: ICD-10-CM

## 2022-12-19 RX ORDER — BUDESONIDE AND FORMOTEROL FUMARATE DIHYDRATE 160; 4.5 UG/1; UG/1
AEROSOL RESPIRATORY (INHALATION)
Qty: 11 G | Refills: 0 | Status: SHIPPED | OUTPATIENT
Start: 2022-12-19 | End: 2023-01-18

## 2022-12-29 DIAGNOSIS — J44.9 CHRONIC OBSTRUCTIVE PULMONARY DISEASE, UNSPECIFIED COPD TYPE: Chronic | ICD-10-CM

## 2022-12-30 ENCOUNTER — TELEPHONE (OUTPATIENT)
Dept: FAMILY MEDICINE | Facility: CLINIC | Age: 62
End: 2022-12-30
Payer: COMMERCIAL

## 2022-12-30 RX ORDER — ALBUTEROL SULFATE 90 UG/1
1-2 AEROSOL, METERED RESPIRATORY (INHALATION) EVERY 6 HOURS PRN
Qty: 18 G | Refills: 3 | Status: SHIPPED | OUTPATIENT
Start: 2022-12-30 | End: 2023-03-31

## 2023-01-19 RX ORDER — ZOLEDRONIC ACID 5 MG/100ML
5 INJECTION, SOLUTION INTRAVENOUS ONCE
Qty: 100 ML | Refills: 0 | OUTPATIENT
Start: 2023-02-01 | End: 2023-02-01

## 2023-01-19 NOTE — TELEPHONE ENCOUNTER
[place] [facility] Clinical Support Staff  Caller: Unspecified (Today,  1:16 PM)  Stout     Pt returned call regarding lab results. Asking for a call back at [phone]

## 2023-01-19 NOTE — TELEPHONE ENCOUNTER
Spoke with pt, informed DXA was consistent w/ her osteoporosis, continue reclast 5mg IV, keep f/u  to regroup.    Pt asked for appointment for Reclast.

## 2023-01-25 NOTE — TELEPHONE ENCOUNTER
Dr. Xiao I spoke with Yi in infusion clinic and I placed the referral incorrectly, I am resending it to you to sign.

## 2023-02-15 DIAGNOSIS — J44.9 CHRONIC OBSTRUCTIVE PULMONARY DISEASE, UNSPECIFIED COPD TYPE: Chronic | ICD-10-CM

## 2023-02-15 DIAGNOSIS — J45.909 ASTHMA, UNSPECIFIED ASTHMA SEVERITY, UNSPECIFIED WHETHER COMPLICATED, UNSPECIFIED WHETHER PERSISTENT: ICD-10-CM

## 2023-02-15 RX ORDER — BUDESONIDE AND FORMOTEROL FUMARATE DIHYDRATE 160; 4.5 UG/1; UG/1
AEROSOL RESPIRATORY (INHALATION)
Qty: 11 G | Refills: 0 | Status: SHIPPED | OUTPATIENT
Start: 2023-02-15 | End: 2023-02-23 | Stop reason: SDUPTHER

## 2023-02-15 RX ORDER — ALBUTEROL SULFATE 0.83 MG/ML
SOLUTION RESPIRATORY (INHALATION)
Qty: 180 ML | Refills: 0 | Status: SHIPPED | OUTPATIENT
Start: 2023-02-15 | End: 2023-02-23 | Stop reason: SDUPTHER

## 2023-02-23 ENCOUNTER — OFFICE VISIT (OUTPATIENT)
Dept: FAMILY MEDICINE | Facility: CLINIC | Age: 63
End: 2023-02-23
Payer: COMMERCIAL

## 2023-02-23 VITALS
BODY MASS INDEX: 19.57 KG/M2 | SYSTOLIC BLOOD PRESSURE: 158 MMHG | RESPIRATION RATE: 20 BRPM | HEIGHT: 62 IN | OXYGEN SATURATION: 95 % | HEART RATE: 75 BPM | DIASTOLIC BLOOD PRESSURE: 77 MMHG | TEMPERATURE: 98 F | WEIGHT: 106.38 LBS

## 2023-02-23 DIAGNOSIS — J41.0 SIMPLE CHRONIC BRONCHITIS: Chronic | ICD-10-CM

## 2023-02-23 DIAGNOSIS — G40.309 NONINTRACTABLE GENERALIZED IDIOPATHIC EPILEPSY WITHOUT STATUS EPILEPTICUS: ICD-10-CM

## 2023-02-23 DIAGNOSIS — I10 PRIMARY HYPERTENSION: Primary | Chronic | ICD-10-CM

## 2023-02-23 PROBLEM — R74.01 ELEVATION OF LEVEL OF TRANSAMINASE AND LACTIC ACID DEHYDROGENASE (LDH): Status: ACTIVE | Noted: 2023-02-23

## 2023-02-23 PROBLEM — F17.290 NICOTINE DEPENDENCE, OTHER TOBACCO PRODUCT, UNCOMPLICATED: Status: ACTIVE | Noted: 2023-02-23

## 2023-02-23 PROBLEM — R74.02 ELEVATION OF LEVEL OF TRANSAMINASE AND LACTIC ACID DEHYDROGENASE (LDH): Status: ACTIVE | Noted: 2023-02-23

## 2023-02-23 PROBLEM — K50.10 CROHN'S DISEASE OF LARGE INTESTINE WITHOUT COMPLICATIONS: Status: ACTIVE | Noted: 2023-02-23

## 2023-02-23 PROBLEM — H30.92 UNSPECIFIED CHORIORETINAL INFLAMMATION, LEFT EYE: Status: ACTIVE | Noted: 2023-02-23

## 2023-02-23 PROBLEM — R53.83 FATIGUE: Status: ACTIVE | Noted: 2023-02-23

## 2023-02-23 PROBLEM — G40.909 EPILEPSY: Status: ACTIVE | Noted: 2023-02-23

## 2023-02-23 PROBLEM — G47.00 INSOMNIA: Status: ACTIVE | Noted: 2023-02-23

## 2023-02-23 PROBLEM — Z79.899 OTHER LONG TERM (CURRENT) DRUG THERAPY: Status: ACTIVE | Noted: 2023-02-23

## 2023-02-23 PROBLEM — R82.90 FOUL SMELLING URINE: Status: ACTIVE | Noted: 2023-02-23

## 2023-02-23 PROBLEM — E53.9 VITAMIN B DEFICIENCY, UNSPECIFIED: Status: ACTIVE | Noted: 2023-02-23

## 2023-02-23 PROBLEM — K64.4 HEMORRHOIDS, EXTERNAL: Status: ACTIVE | Noted: 2023-02-23

## 2023-02-23 PROCEDURE — 99214 OFFICE O/P EST MOD 30 MIN: CPT | Mod: ,,, | Performed by: FAMILY MEDICINE

## 2023-02-23 PROCEDURE — 1160F RVW MEDS BY RX/DR IN RCRD: CPT | Mod: CPTII,,, | Performed by: FAMILY MEDICINE

## 2023-02-23 PROCEDURE — 3077F SYST BP >= 140 MM HG: CPT | Mod: CPTII,,, | Performed by: FAMILY MEDICINE

## 2023-02-23 PROCEDURE — 3077F PR MOST RECENT SYSTOLIC BLOOD PRESSURE >= 140 MM HG: ICD-10-PCS | Mod: CPTII,,, | Performed by: FAMILY MEDICINE

## 2023-02-23 PROCEDURE — 1159F MED LIST DOCD IN RCRD: CPT | Mod: CPTII,,, | Performed by: FAMILY MEDICINE

## 2023-02-23 PROCEDURE — 99214 PR OFFICE/OUTPT VISIT, EST, LEVL IV, 30-39 MIN: ICD-10-PCS | Mod: ,,, | Performed by: FAMILY MEDICINE

## 2023-02-23 PROCEDURE — 1159F PR MEDICATION LIST DOCUMENTED IN MEDICAL RECORD: ICD-10-PCS | Mod: CPTII,,, | Performed by: FAMILY MEDICINE

## 2023-02-23 PROCEDURE — 3008F BODY MASS INDEX DOCD: CPT | Mod: CPTII,,, | Performed by: FAMILY MEDICINE

## 2023-02-23 PROCEDURE — 3008F PR BODY MASS INDEX (BMI) DOCUMENTED: ICD-10-PCS | Mod: CPTII,,, | Performed by: FAMILY MEDICINE

## 2023-02-23 PROCEDURE — 3078F PR MOST RECENT DIASTOLIC BLOOD PRESSURE < 80 MM HG: ICD-10-PCS | Mod: CPTII,,, | Performed by: FAMILY MEDICINE

## 2023-02-23 PROCEDURE — 1160F PR REVIEW ALL MEDS BY PRESCRIBER/CLIN PHARMACIST DOCUMENTED: ICD-10-PCS | Mod: CPTII,,, | Performed by: FAMILY MEDICINE

## 2023-02-23 PROCEDURE — 3078F DIAST BP <80 MM HG: CPT | Mod: CPTII,,, | Performed by: FAMILY MEDICINE

## 2023-02-23 RX ORDER — BUDESONIDE AND FORMOTEROL FUMARATE DIHYDRATE 160; 4.5 UG/1; UG/1
2 AEROSOL RESPIRATORY (INHALATION) 2 TIMES DAILY
Qty: 11 G | Refills: 99 | Status: SHIPPED | OUTPATIENT
Start: 2023-02-23 | End: 2023-08-24

## 2023-02-23 RX ORDER — TIOTROPIUM BROMIDE 18 UG/1
18 CAPSULE ORAL; RESPIRATORY (INHALATION) DAILY
Qty: 90 CAPSULE | Refills: 1 | Status: SHIPPED | OUTPATIENT
Start: 2023-02-23 | End: 2023-09-06

## 2023-02-23 NOTE — PROGRESS NOTES
Subjective:      Patient ID: Judy Vides is a 62 y.o. female.    Chief Complaint: Hypertension and Medication Refill    Patient here for 6 month surveillance visit on COPD and HTN    BP elevated today, but patient insists this is out of character for her when compared to her usual home BP readings. Denies symptoms    Problem List Items Addressed This Visit       Chronic obstructive pulmonary disease (Chronic)    Relevant Medications    SYMBICORT 160-4.5 mcg/actuation HFAA    tiotropium (SPIRIVA WITH HANDIHALER) 18 mcg inhalation capsule    Hypertension - Primary (Chronic)    Relevant Orders    Hypertension Digital Medicine (HDMP) Enrollment Order (Completed)    Hypertension Digital Medicine (HDMP): Assign Onboarding Questionnaires (Completed)    Epilepsy       The patient's Health Maintenance was reviewed and the following appears to be due:   Health Maintenance Due   Topic Date Due    Hepatitis C Screening  Never done    Cervical Cancer Screening  Never done    HIV Screening  Never done    TETANUS VACCINE  Never done    LDCT Lung Screen  Never done    Shingles Vaccine (1 of 2) Never done    COVID-19 Vaccine (4 - Booster for Moderna series) 03/21/2022       Past Medical History:  Past Medical History:   Diagnosis Date    Anxiety     COPD (chronic obstructive pulmonary disease)     Crohn disease     Epilepsy     Hypertension     Osteoporosis      Past Surgical History:   Procedure Laterality Date    APPENDECTOMY      CATARACT EXTRACTION      EYE SURGERY       Review of patient's allergies indicates:  No Known Allergies  Current Outpatient Medications on File Prior to Visit   Medication Sig Dispense Refill    albuterol (PROVENTIL) 2.5 mg /3 mL (0.083 %) nebulizer solution USE 1 VIAL IN NEBULIZER EVERY 6 HOURS 180 mL 0    albuterol (PROVENTIL/VENTOLIN HFA) 90 mcg/actuation inhaler Inhale 1-2 puffs into the lungs every 6 (six) hours as needed for Wheezing. Rescue 18 g 3    busPIRone (BUSPAR) 5 MG Tab TAKE 1 TABLET BY  MOUTH THREE TIMES DAILY AS NEEDED FOR ANXIETY 90 tablet 4    cyanocobalamin 1,000 mcg/mL injection Inject 1,000 mcg into the muscle every 7 days.      ergocalciferol (ERGOCALCIFEROL) 50,000 unit Cap Take 50,000 Units by mouth every 7 days.      HUMIRA,CF, PEN 40 mg/0.4 mL PnKt SMARTSI Milligram(s) SUB-Q Every 2 Weeks      nebulizer accessories (NEBULIZER MISC) 1 each by Misc.(Non-Drug; Combo Route) route daily as needed. RAJNI: 99 Dispense with preferred suppies      nebulizers Misc 1 each by Misc.(Non-Drug; Combo Route) route daily as needed (PRN COPD). RAJNI: 99 Dispense with preferred supplies 1 each 0    ofloxacin (OCUFLOX) 0.3 % ophthalmic solution Place 1 drop into the right eye 4 (four) times daily.      OXcarbazepine (TRILEPTAL) 600 MG Tab Take 2 tablets (1,200 mg total) by mouth once daily. 180 tablet 4    [DISCONTINUED] SYMBICORT 160-4.5 mcg/actuation HFAA Inhale 2 puffs by mouth twice daily 11 g 0    acetaminophen-codeine 300-30mg (TYLENOL #3) 300-30 mg Tab Take 1-2 tablets by mouth.      lisinopriL-hydrochlorothiazide (PRINZIDE,ZESTORETIC) 20-25 mg Tab Take 1 tablet by mouth once daily. 90 tablet 1    [DISCONTINUED] albuterol (PROVENTIL) 2.5 mg /3 mL (0.083 %) nebulizer solution USE 1 VIAL IN NEBULIZER 4 TIMES DAILY AS NEEDED FOR WHEEZING --RESCUE 180 mL 0    [DISCONTINUED] tiotropium (SPIRIVA WITH HANDIHALER) 18 mcg inhalation capsule Inhale 1 capsule (18 mcg total) into the lungs once daily at 6am. Controller 90 capsule 1     No current facility-administered medications on file prior to visit.     Social History     Socioeconomic History    Marital status: Single    Number of children: 0   Occupational History    Occupation: [employer] Employee    Tobacco Use    Smoking status: Every Day     Packs/day: 0.50     Years: 50.00     Pack years: 25.00     Types: Cigarettes    Smokeless tobacco: Never   Substance and Sexual Activity    Alcohol use: Not Currently    Drug use: Not Currently    Sexual activity: Not  "Currently     Family History   Problem Relation Age of Onset    Heart disease Mother     Emphysema Father        Review of Systems   All other systems reviewed and are negative.    Objective:   BP (!) 158/77 (BP Location: Left arm, Patient Position: Sitting, BP Method: Medium (Automatic))   Pulse 75   Temp 98.1 °F (36.7 °C) (Oral)   Resp 20   Ht 5' 2" (1.575 m)   Wt 48.3 kg (106 lb 6.4 oz)   SpO2 95%   BMI 19.46 kg/m²     Physical Exam  Vitals and nursing note reviewed.   Constitutional:       Appearance: Normal appearance. She is normal weight.   HENT:      Head: Normocephalic and atraumatic.      Right Ear: Tympanic membrane, ear canal and external ear normal.      Left Ear: Tympanic membrane, ear canal and external ear normal.      Nose: Nose normal.      Mouth/Throat:      Mouth: Mucous membranes are moist.      Pharynx: Oropharynx is clear.   Eyes:      Extraocular Movements: Extraocular movements intact.      Conjunctiva/sclera: Conjunctivae normal.      Pupils: Pupils are equal, round, and reactive to light.   Cardiovascular:      Rate and Rhythm: Normal rate and regular rhythm.      Pulses: Normal pulses.      Heart sounds: Normal heart sounds.   Pulmonary:      Effort: Pulmonary effort is normal.      Breath sounds: Normal breath sounds.   Abdominal:      General: Abdomen is flat. Bowel sounds are normal.      Palpations: Abdomen is soft.   Musculoskeletal:         General: Normal range of motion.      Cervical back: Normal range of motion and neck supple.   Skin:     General: Skin is warm and dry.      Capillary Refill: Capillary refill takes less than 2 seconds.   Neurological:      General: No focal deficit present.      Mental Status: She is alert and oriented to person, place, and time. Mental status is at baseline.   Psychiatric:         Mood and Affect: Mood normal.         Behavior: Behavior normal.         Thought Content: Thought content normal.         Judgment: Judgment normal. "       Procedures     Lab Visit on 11/08/2022   Component Date Value Ref Range Status    Adalimumab QN with Reflex to Ab 11/08/2022 12.3  mcg/mL Final    For clinical assessment of response to therapy, adalimumab   should be measured at trough. When adalimumab trough   concentrations are greater than 8.0 mcg/mL, clinically   relevant antibodies-to-adalimumab are unlikely and reflex   testing will not be performed.     -------------------REFERENCE VALUE--------------------------  Limit of Quantitation = 0.8 mcg/mL     -------------------ADDITIONAL INFORMATION-------------------  This test was developed and its performance characteristics   determined by [facility] in a manner consistent with   CLIA requirements. This test has not been cleared or   approved by the U.S. Food and Drug Administration.     Test Performed by:  [facility] - [address] [address]  : Kemal Smith M.D. Ph.D.; CLIA# [id number]    C-Reactive Protein 11/08/2022 4.10  <5.00 mg/L Final    Vit D 25 [place] 11/08/2022 22.8 (L)  30.0 - 80.0 ng/mL Final    WBC 11/08/2022 10.3  4.5 - 11.5 x10(3)/mcL Final    RBC 11/08/2022 4.97  4.20 - 5.40 x10(6)/mcL Final    Hgb 11/08/2022 15.9  12.0 - 16.0 gm/dL Final    Hct 11/08/2022 48.5 (H)  37.0 - 47.0 % Final    MCV 11/08/2022 97.6 (H)  80.0 - 94.0 fL Final    MCH 11/08/2022 32.0 (H)  27.0 - 31.0 pg Final    MCHC 11/08/2022 32.8 (L)  33.0 - 36.0 mg/dL Final    RDW 11/08/2022 13.9  11.5 - 17.0 % Final    Platelet 11/08/2022 466 (H)  130 - 400 x10(3)/mcL Final    MPV 11/08/2022 10.5 (H)  7.4 - 10.4 fL Final    Neut % 11/08/2022 74.4  % Final    Lymph % 11/08/2022 17.5  % Final    Mono % 11/08/2022 5.1  % Final    Eos % 11/08/2022 1.8  % Final    Basophil % 11/08/2022 0.8  % Final    Lymph # 11/08/2022 1.80  0.6 - 4.6 x10(3)/mcL Final    Neut # 11/08/2022 7.7  2.1 - 9.2 x10(3)/mcL Final    Mono # 11/08/2022 0.52  0.1 - 1.3 x10(3)/mcL Final     Eos # 11/08/2022 0.19  0 - 0.9 x10(3)/mcL Final    Baso # 11/08/2022 0.08  0 - 0.2 x10(3)/mcL Final    IG# 11/08/2022 0.04  0 - 0.04 x10(3)/mcL Final    IG% 11/08/2022 0.4  % Final    NRBC% 11/08/2022 0.0  % Final       Mammo Digital Screening Bilat w/ Ryland   - MAMMO DIGITAL SCREENING BILAT WITH RYLAND    BILATERAL DIGITAL SCREENING MAMMOGRAM 3D/2D WITH CAD: 12/15/2022  HISTORY: 62-year-old woman presents for screening mammogram.      COMPARISONS: No prior exams were available for comparison.      TECHNIQUE: Digital mammography views were performed with tomosynthesis. Current study was evaluated with a Computer Aided Detection (CAD) system.     BREAST COMPOSITION: The breasts are heterogeneously dense, which may obscure small masses.      FINDINGS:   No suspicious mass, asymmetry, distortion, or calcification is identified.     IMPRESSION: NEGATIVE  Right Breast: Negative (BI-RADS 1)  Left Breast: Negative (BI-RADS 1)    Recommendations:  Recommend continued annual screening mammography (with Digital Breast Tomosynthesis), according to American College of Radiology guidelines.    Ramy Littlejohn M.D.            lm/:12/15/2022 18:58:23      letter sent: Mammography Normal    Mammogram BI-RADS: 1 Negative       Assessment:     1. Primary hypertension    2. Simple chronic bronchitis    3. Nonintractable generalized idiopathic epilepsy without status epilepticus      Plan:   I have changed Judy Vides's SYMBICORT and SPIRIVA WITH HANDIHALER. I am also having her maintain her cyanocobalamin, ergocalciferol, lisinopriL-hydrochlorothiazide, HUMIRA(CF) PEN, acetaminophen-codeine 300-30mg, ofloxacin, nebulizer accessories (NEBULIZER MISC), nebulizers, busPIRone, albuterol, albuterol, and OXcarbazepine.  Problem List Items Addressed This Visit       Chronic obstructive pulmonary disease (Chronic)    Relevant Medications    SYMBICORT 160-4.5 mcg/actuation HFAA    tiotropium (SPIRIVA WITH HANDIHALER) 18 mcg inhalation  capsule    Hypertension - Primary (Chronic)    Relevant Orders    Hypertension Digital Medicine ([facility]) Enrollment Order (Completed)    Hypertension Digital Medicine ([facility]): Assign Onboarding Questionnaires (Completed)    Epilepsy     Follow up in about 6 months (around 8/23/2023).    Judy was seen today for hypertension and medication refill.    Diagnoses and all orders for this visit:    Primary hypertension  -     Hypertension Digital Medicine ([facility]) Enrollment Order  -     Hypertension Digital Medicine ([facility]): Assign Onboarding Questionnaires  - Recheck BP prior to DC  - RTC 6 months for face-to-face reassessment  - Referral to digital medicine and/or instructions for home BP management and measurement given  - DASH diet and HTN Lifestyle change handouts printed and provided    Simple chronic bronchitis  -     SYMBICORT 160-4.5 mcg/actuation HFAA; Inhale 2 puffs into the lungs 2 (two) times daily. Controller  -     tiotropium (SPIRIVA WITH HANDIHALER) 18 mcg inhalation capsule; Inhale 1 capsule (18 mcg total) into the lungs once daily. Controller   Continue current prescription medications. Refills as needed   Condition/Symptoms controlled/stable   Surveillance labs ordered as needed, or reviewed in visit.   RTC 6 months (as scheduled) or PRN    Nonintractable generalized idiopathic epilepsy without status epilepticus   Noted, sees specialist for this, stable, no seizure activity for years, no changes today, refills as/if needed    Medications Ordered This Encounter   Medications    SYMBICORT 160-4.5 mcg/actuation HFAA     Sig: Inhale 2 puffs into the lungs 2 (two) times daily. Controller     Dispense:  11 g     Refill:  99    tiotropium (SPIRIVA WITH HANDIHALER) 18 mcg inhalation capsule     Sig: Inhale 1 capsule (18 mcg total) into the lungs once daily. Controller     Dispense:  90 capsule     Refill:  1     [unfilled]  Orders Placed This Encounter   Procedures    Hypertension Digital Medicine ([facility])  Enrollment Order     I. PURPOSE  To provide [facility] patients with innovative, specialized blood pressure monitoring and optimal dosing of antihypertensive therapy to improve health outcomes and decrease microvascular and macrovascular complications    II. GOALS  To maintain a systematic, coordinated and cost-effective process to monitor blood pressure in patients with hypertension  To attain and maintain optimal antihypertensive therapy while ensuring patient safety using the most recent evidence-based guidelines for the management of hypertension as reported by AHA/ACC in 2017.   Provide consultative services to providers, patients and caregivers regarding optimal antihypertensive therapy  To improve patient/caregiver understanding and compliance related to antihypertensive therapies by providing continuous patient and caregiver education about their prescribed medications and associated disease state  To provide education, guidance and reinforcement regarding lifestyle modifications including weight loss, adopting and maintaining the Dietary Approaches to Stop Hypertension or DASH diet, sodium restriction, physical activity, and moderation of alcohol consumption to patients and caregivers  Allow providers increased availability of clinic time for direct patient care   To provide patient care and education within an interdisciplinary framework and enhance partnering with other members of health care team  Improve continuity of care for high blood pressure patients and improve patient engagement  Collect and utilize pharmacy related outcomes to improve quality of patient care    III. COLLABORATIVE PRACTICE AGREEMENT    A.  Under this collaborative practice agreement, an [facility] pharmacist according to and in compliance with [place] Board of Pharmacy Title 46, Part LIII, section 523 and [place] Board of Medical Examiners definition of the Collaborative Drug Therapy Management (CDTM) may initiate,  implement, alter and monitor a therapeutic drug plan intended to manage antihypertensive therapy.  Services offered by the hypertension pharmacy specialist may include education on disease state and lifestyle modification, in addition to the drug therapy services listed above. Written and audio/visual educational materials and patient specific information may be provided to improve quality of care.    B. Primary Collaborating Physician:    Primary Physician: Sherman Robertson M.D., [facility]  , Cardiology  License number: [id number]  CDTM number:  [id number]  Telephone number: [phone]   E-mail address: [email or web address]  Emergency Contact Information: [phone]    Back-Up Physician:  Geovanni Chapa M.D.  Cardiology  License number:  [id number]  CDTM number:  [id number]  Telephone number:  [phone]  E-mail address: [email or web address]  Emergency Contact Information: [phone]    C.  Pharmacists:   Each of the following pharmacists will serve as the primary pharmacist on CDTM and any pharmacist may serve as the secondary pharmacist for another pharmacists agreement.  Each of the pharmacists listed below has a Pharm.D degree, with completion of an accredited pharmacy practice residency and as well as experience with managing patients along with a physician.  The outpatient monitoring service will be provided under the Cardiology Department at [facility] [place] location in [place] at [address] [place] [address]. The pharmacist will contact patients via telephone from a remote location.    Pharmacist: Shauna Duron, IsauroD  This pharmacist has completed a pharmacy practice residency and has practiced clinical pharmacy for 7 years. She has experience in the areas of cardiology, acute care, and managed care. She started a pharmacist run hypertension clinic at [facility] during her residency and was published in The American Journal of  Health-System Pharmacists.     [place] Pharmacist License Number: [id number]  CDTM number:  [id number]  Contact Number:  [phone]  Contact E-mail: [email or web address]  Emergency Contact Number:  [phone]    Pharmacist:  Jami Stark PharmD  This pharmacist has completed a pharmacy practice residency and has practiced clinical pharmacy for 17 years in the areas of cardiology, geriatric medicine, anticoagulation management, retail and ambulatory care.  She served as a pharmacy practice clinical preceptor and lead pharmacist in anticoagulation clinic for 10 years.  [place] Pharmacist License # [id number]  CDTM number:  [id number]  Contact Number:  [phone]  Contact E-mail:  [email or web address]   Emergency Contact Number:  [phone]    Pharmacist: Shamika Lam PharmD, BCACP, CDE  This pharmacist has completed a pharmacy practice residency with emphasis in ambulatory care and has practiced clinical pharmacy for 8 years in the areas of dyslipidemia, hypertension, diabetes, and anticoagulation. She is also a Certified Diabetes Educator.      [place] Pharmacist License # [id number]  CDTM number: [id number]  Contact number: [phone]  Contact E-mail:  [email or web address]  Emergency Contact Number: [phone]    Pharmacist: Veena Prince PharmD  This pharmacist started practicing at [facility] in 2011 following her one year residency at [facility]. She serves as an Adjunct Clinical  in the College of Pharmacy at [employer]. She served as the lead pharmacist for the Coumadin Clinic team for 4 years.   [place] Pharmacist License: [id number]  CDTM number: [id number]  Contact number: [phone]  Contact E-mail: [email or web address]   Emergency Contact Number: [phone]    Pharmacist:  Mirta Corcoran PharmD  This pharmacist has completed a pharmacy practice residency (PGY-1) and has practiced clinical pharmacy for 16  years in  the areas of heart and abdominal transplant, retail and ambulatory care.  She was directly involved in the care of congestive heart failure, left ventricular assist device and heart transplant patients from 7009-3474.  She is currently practicing as a digital medicine clinical specialist in heart failure.    [place] Pharmacist License # [id number]  CDTM number:  [id number]  Contact Number:  [phone]  Contact E-mail:  [email or web address]   Emergency Contact Number:  [phone]    Pharmacist: Veena Motta PharmD  This pharmacist obtained her Pharm.D. from [employer], and has completed an Ambulatory Care Pharmacy Practice residency at [facility]. She has practiced clinical pharmacy for 9  years and has experience in the areas of Hypertension and Diabetes.      [place] Pharmacist License: [id number]  CDTM Number: [id number]  Telephone number: [phone]  E-mail: [email or web address]  Emergency Contact Number: [phone]      Pharmacist: Humera Sloan PharmD  This pharmacist has completed a pharmacy practice residency with an emphasis in ambulatory care and has practiced clinical pharmacy for one year. She has experience in the areas of diabetes, hypertension and dyslipidemia.   [place] Pharmacist License: [id number]  CDTM Number: [id number]  Contact Number: [phone]  Contact Email: [email or web address]   Emergency Contact: [phone]    Pharmacist: Asha Ragsdale, Cyndy, BCPS  This pharmacist has completed a pharmacy practice residency with an emphasis in ambulatory care and is a Board Certified Pharmacotherapy Specialist (BCPS). She has practiced clinical pharmacy for  years in areas of ambulatory care, internal medicine, cardiology and specialty pharmacy.    [place] Pharmacist License Number: [id number]  CDTM number:  [id number]  Contact Number:  [phone]  Contact E-mail:  [email or web address]   Emergency Contact  Number:  [phone]    D.  Eligible Patients  Patients whose antihypertensive therapy is managed under this agreement must have a diagnosis of hypertension as documented in the patient record, and have established care with a provider within [facility]. All aspects of the patients hypertension medication management will be followed in collaboration with the physician treating the patients hypertension.  The patient will be seen by his or her physician per their discretion or by the recommendation by the hypertension pharmacist.  The patients case may be reviewed with clinic medical personnel as needed, but will be reported at least every 30 days to the collaborating physician regarding the patients drug therapy management. All decisions made by the pharmacist will be recorded in [facility] and are readily available for physician review. All issues outside of the scope of antihypertensive therapy shall be reported to the collaborating physician.     E.  Medications in CDTM  This collaborative practice proposal involves the management of patients who are receiving antihypertensive therapy, specifically, thiazide-type diuretics, angiotensin-converting enzyme inhibtors (ACE-I), angiotensin receptor blockers (ARB), calcium channel blockers (CCB), beta-blockers, loop diuretics, potassium-sparing diuretics, potassium supplementation, aldosterone antagonists, direct renin inhibitors, alpha-1 receptor blockers, central alpha-2 agonists, direct arterial vasodilators and peripheral adrenergic antoagonists, or those patients in which hypertension is controlled by lifestyle modifcation alone.      F.  Clinical Procedure  All patients will be notified that a hypertension CDTM exists.  A signed physician order will be required for each patient to be enrolled into the hypertension CDTM.  Informed consent and an electronic signature will be obtained from each patient and documented in the patients record.  This  patient signature will be valid for 1 year, requiring a new physician order and patient consent yearly.  The patient must also be enrolled in the electornic communication program ([facility]) to be elegible for the monitoring program.  The following management plan will be utilized for CDTM:    Patients must submit blood pressures at a minimum of once weekly from the patient- purchased wireless blood pressure cuff. Patients who fail to comply with this requirement are subject to removal from [facility].   Evaluate the change in blood pressure, if any, from previous measurements performed on the same cuff and arm.  Determine and document contributing factors for blood pressure change.    Review initial drug therapy made by clinic physician upon program enrollment with patient to ensure compliance.    Identify target blood pressure based on age and comorbidities. Therapeutic changes will be made in collaboration with PharmD and collaborating physician based on the AHA/ACC 2017 guidelines for the treatment of hypertension.  Guide drug optimization based on patient response at 2-4 week intervals until control is achieved.  The PharmD will continue to monitor blood pressure and make adjustments to hypertension medications in order to achieve optimal blood pressure.   Order follow up labs when changing or adding ACE inhibitors and diuretics.    Refer to hypertension specialist if  blood pressure goals cannot be achieved using the noted algorithm.  Notify physician with specific problems or request clinic visit if deemed necessary.  Document antihypertensive therapy changes, interventions, and outcomes in EMR.   Provide patient/caregiver continuous education or reinforcement regarding hypertension management,  medications and lifestyle modifications.    Laboratory Tests  Pharmacists will be authorized to order and evaluate laboratory tests directly related to the disease specific drug therapy being managed. Pharmacists will also  be authorized to order additional specific labs based on patient clinical presentation or description. Pharmacists may also review other lab data available in the patient record which may be necessary for the evaluation and assessment of the impact on antihypertensive therapy (i.e. drug interaction, disease interaction, etc.).     b.  Documentation   Documentation of patient encounters and lab results will be permanently placed in the [facility] EMR.  Laboratory results will automatically populate prompting review and assessment of each patient.  Laboratory results obtained from outside laboratories will be entered into EMR manually.  This documentation will include lab values, medication changes, identification and assessment of adverse events related to therapy, antihypertensive medication dose adjustments, therapeutic management plan and follow up as well as any other information given to the patient during the telemedicine visit.    c.     1.   will be carried out through quarterly reports tracking following statistics:   a. Percent of patients requiring a change to antihypertensive medications   b. Number of emergency visits due to hypertensive urgency or emergency, hypotension or medication side effects for participating patients  Percent of patients who are controlled (BP <130/80 mmHg)  and uncontrolled (BP>130 mmHg)     2.  Patient specific quality indicators will be identified through quarterly review of the following data:   a.  Average change in blood pressure after a dose adjument by the hypertension pharmacist    3.  A random sample of patient records shall be reviewed by the primary physician quarterly to ensure adherence by the hypertension clinical specialists to the collaborative practice agreement.     4.   measures will be reported to Pharmacy & Therapeutics Committee yearly.     References:    JUAN Thakur, et al. 2017. 2017. Guidelines for the  Prevention, Detection, Evaluation and Management of High Blood Pressure in Adults.      Order Specific Question:   BP Control Goal     Answer:   Current (2017) AHA Guidelines       Medication List with Changes/Refills   Current Medications    ACETAMINOPHEN-CODEINE 300-30MG (TYLENOL #3) 300-30 MG TAB    Take 1-2 tablets by mouth.    ALBUTEROL (PROVENTIL) 2.5 MG /3 ML (0.083 %) NEBULIZER SOLUTION    USE 1 VIAL IN NEBULIZER EVERY 6 HOURS    ALBUTEROL (PROVENTIL/VENTOLIN HFA) 90 MCG/ACTUATION INHALER    Inhale 1-2 puffs into the lungs every 6 (six) hours as needed for Wheezing. Rescue    BUSPIRONE (BUSPAR) 5 MG TAB    TAKE 1 TABLET BY MOUTH THREE TIMES DAILY AS NEEDED FOR ANXIETY    CYANOCOBALAMIN 1,000 MCG/ML INJECTION    Inject 1,000 mcg into the muscle every 7 days.    ERGOCALCIFEROL (ERGOCALCIFEROL) 50,000 UNIT CAP    Take 50,000 Units by mouth every 7 days.    HUMIRA,CF, PEN 40 MG/0.4 ML PNKT    SMARTSI Milligram(s) SUB-Q Every 2 Weeks    LISINOPRIL-HYDROCHLOROTHIAZIDE (PRINZIDE,ZESTORETIC) 20-25 MG TAB    Take 1 tablet by mouth once daily.    NEBULIZER ACCESSORIES (NEBULIZER MISC)    1 each by Misc.(Non-Drug; Combo Route) route daily as needed. RAJNI: 99 Dispense with preferred suppies    NEBULIZERS MISC    1 each by Misc.(Non-Drug; Combo Route) route daily as needed (PRN COPD). RAJNI: 99 Dispense with preferred supplies    OFLOXACIN (OCUFLOX) 0.3 % OPHTHALMIC SOLUTION    Place 1 drop into the right eye 4 (four) times daily.    OXCARBAZEPINE (TRILEPTAL) 600 MG TAB    Take 2 tablets (1,200 mg total) by mouth once daily.   Changed and/or Refilled Medications    Modified Medication Previous Medication    SYMBICORT 160-4.5 MCG/ACTUATION HFAA SYMBICORT 160-4.5 mcg/actuation HFAA       Inhale 2 puffs into the lungs 2 (two) times daily. Controller    Inhale 2 puffs by mouth twice daily    TIOTROPIUM (SPIRIVA WITH HANDIHALER) 18 MCG INHALATION CAPSULE tiotropium (SPIRIVA WITH HANDIHALER) 18 mcg inhalation capsule        Inhale 1 capsule (18 mcg total) into the lungs once daily. Controller    Inhale 1 capsule (18 mcg total) into the lungs once daily at 6am. Controller   Discontinued Medications    ALBUTEROL (PROVENTIL) 2.5 MG /3 ML (0.083 %) NEBULIZER SOLUTION    USE 1 VIAL IN NEBULIZER 4 TIMES DAILY AS NEEDED FOR WHEEZING --RESCUE      Medication List with Changes/Refills   Current Medications    ACETAMINOPHEN-CODEINE 300-30MG (TYLENOL #3) 300-30 MG TAB    Take 1-2 tablets by mouth.       Start Date: 2022 End Date: --    ALBUTEROL (PROVENTIL) 2.5 MG /3 ML (0.083 %) NEBULIZER SOLUTION    USE 1 VIAL IN NEBULIZER EVERY 6 HOURS       Start Date: 2022End Date: --    ALBUTEROL (PROVENTIL/VENTOLIN HFA) 90 MCG/ACTUATION INHALER    Inhale 1-2 puffs into the lungs every 6 (six) hours as needed for Wheezing. Rescue       Start Date: 2022End Date: 2023    BUSPIRONE (BUSPAR) 5 MG TAB    TAKE 1 TABLET BY MOUTH THREE TIMES DAILY AS NEEDED FOR ANXIETY       Start Date: 2022End Date: --    CYANOCOBALAMIN 1,000 MCG/ML INJECTION    Inject 1,000 mcg into the muscle every 7 days.       Start Date: 2022 End Date: --    ERGOCALCIFEROL (ERGOCALCIFEROL) 50,000 UNIT CAP    Take 50,000 Units by mouth every 7 days.       Start Date: 2022 End Date: --    HUMIRA,CF, PEN 40 MG/0.4 ML PNKT    SMARTSI Milligram(s) SUB-Q Every 2 Weeks       Start Date: 10/31/2022End Date: --    LISINOPRIL-HYDROCHLOROTHIAZIDE (PRINZIDE,ZESTORETIC) 20-25 MG TAB    Take 1 tablet by mouth once daily.       Start Date: 2022 End Date: 2023    NEBULIZER ACCESSORIES (NEBULIZER MISC)    1 each by Misc.(Non-Drug; Combo Route) route daily as needed. RAJNI: 99 Dispense with preferred suppies       Start Date: --        End Date: --    NEBULIZERS MISC    1 each by Misc.(Non-Drug; Combo Route) route daily as needed (PRN COPD). RAJNI: 99 Dispense with preferred supplies       Start Date: 11/15/2022End Date: --    OFLOXACIN (OCUFLOX) 0.3 % OPHTHALMIC  SOLUTION    Place 1 drop into the right eye 4 (four) times daily.       Start Date: 9/14/2022 End Date: --    OXCARBAZEPINE (TRILEPTAL) 600 MG TAB    Take 2 tablets (1,200 mg total) by mouth once daily.       Start Date: 1/19/2023 End Date: 4/13/2024   Changed and/or Refilled Medications    Modified Medication Previous Medication    SYMBICORT 160-4.5 MCG/ACTUATION HFAA SYMBICORT 160-4.5 mcg/actuation HFAA       Inhale 2 puffs into the lungs 2 (two) times daily. Controller    Inhale 2 puffs by mouth twice daily       Start Date: 2/23/2023 End Date: 8/22/2023    Start Date: 2/15/2023 End Date: 2/23/2023    TIOTROPIUM (SPIRIVA WITH HANDIHALER) 18 MCG INHALATION CAPSULE tiotropium (SPIRIVA WITH HANDIHALER) 18 mcg inhalation capsule       Inhale 1 capsule (18 mcg total) into the lungs once daily. Controller    Inhale 1 capsule (18 mcg total) into the lungs once daily at 6am. Controller       Start Date: 2/23/2023 End Date: 8/22/2023    Start Date: 8/17/2022 End Date: 2/23/2023   Discontinued Medications    ALBUTEROL (PROVENTIL) 2.5 MG /3 ML (0.083 %) NEBULIZER SOLUTION    USE 1 VIAL IN NEBULIZER 4 TIMES DAILY AS NEEDED FOR WHEEZING --RESCUE       Start Date: 2/15/2023 End Date: 2/23/2023

## 2023-03-06 ENCOUNTER — TELEPHONE (OUTPATIENT)
Dept: ENDOCRINOLOGY | Facility: CLINIC | Age: 63
End: 2023-03-06
Payer: COMMERCIAL

## 2023-03-06 ENCOUNTER — PATIENT MESSAGE (OUTPATIENT)
Dept: ENDOCRINOLOGY | Facility: CLINIC | Age: 63
End: 2023-03-06
Payer: COMMERCIAL

## 2023-03-06 DIAGNOSIS — M81.0 OSTEOPOROSIS, UNSPECIFIED OSTEOPOROSIS TYPE, UNSPECIFIED PATHOLOGICAL FRACTURE PRESENCE: Primary | ICD-10-CM

## 2023-03-06 RX ORDER — HEPARIN 100 UNIT/ML
500 SYRINGE INTRAVENOUS
Status: CANCELLED | OUTPATIENT
Start: 2023-03-07

## 2023-03-06 RX ORDER — SODIUM CHLORIDE 0.9 % (FLUSH) 0.9 %
10 SYRINGE (ML) INJECTION
Status: CANCELLED | OUTPATIENT
Start: 2023-03-07

## 2023-03-06 RX ORDER — ZOLEDRONIC ACID 5 MG/100ML
5 INJECTION, SOLUTION INTRAVENOUS
Status: CANCELLED | OUTPATIENT
Start: 2023-03-07

## 2023-03-06 NOTE — LETTER
March 6, 2023    Judy Vides  [address] [place] [address] [facility]  [address] [facility] [address]  Phone: [phone] Ms. Kim,      We are writing you because our efforts to reach you by telephone has been unsuccessful.    Please contact our clinic by dialing  at your earliest convenience.    We look forward to hearing from you soon.    Respectfully,    [facility]

## 2023-03-06 NOTE — TELEPHONE ENCOUNTER
----- Message from Margareth Cisneros sent at 3/2/2023  8:12 AM CST -----  STOUT PT           Pt is requesting a call regarding scheduling the infusion. She has not received a call. Please call back @ [phone]

## 2023-03-06 NOTE — TELEPHONE ENCOUNTER
Referral not scheduled due to medication not ordered, phoned pharmacy for assistance in placing the order, Ni will place as a verbal as I do not seem to have the option to do so.

## 2023-03-06 NOTE — TELEPHONE ENCOUNTER
Orders signed.  Given the delays and lack of updated vit d, ok to recheck a vit d the day she comes for the reclast so can sort if vit d is better and can push f/u back or need further replacement and to keep f/u later this year as booked to regroup.

## 2023-03-09 ENCOUNTER — DOCUMENTATION ONLY (OUTPATIENT)
Dept: ADMINISTRATIVE | Facility: HOSPITAL | Age: 63
End: 2023-03-09
Payer: COMMERCIAL

## 2023-03-30 ENCOUNTER — INFUSION (OUTPATIENT)
Dept: INFUSION THERAPY | Facility: HOSPITAL | Age: 63
End: 2023-03-30
Attending: INTERNAL MEDICINE
Payer: COMMERCIAL

## 2023-03-30 VITALS
OXYGEN SATURATION: 91 % | DIASTOLIC BLOOD PRESSURE: 83 MMHG | HEART RATE: 77 BPM | HEIGHT: 62 IN | WEIGHT: 110.25 LBS | BODY MASS INDEX: 20.29 KG/M2 | RESPIRATION RATE: 20 BRPM | SYSTOLIC BLOOD PRESSURE: 145 MMHG | TEMPERATURE: 98 F

## 2023-03-30 DIAGNOSIS — M81.0 OSTEOPOROSIS, UNSPECIFIED OSTEOPOROSIS TYPE, UNSPECIFIED PATHOLOGICAL FRACTURE PRESENCE: Primary | ICD-10-CM

## 2023-03-30 PROCEDURE — 63600175 PHARM REV CODE 636 W HCPCS: Performed by: INTERNAL MEDICINE

## 2023-03-30 PROCEDURE — 96365 THER/PROPH/DIAG IV INF INIT: CPT

## 2023-03-30 PROCEDURE — 25000003 PHARM REV CODE 250: Performed by: INTERNAL MEDICINE

## 2023-03-30 RX ORDER — HEPARIN 100 UNIT/ML
500 SYRINGE INTRAVENOUS
Status: CANCELLED | OUTPATIENT
Start: 2023-03-30

## 2023-03-30 RX ORDER — HEPARIN 100 UNIT/ML
500 SYRINGE INTRAVENOUS
Status: DISCONTINUED | OUTPATIENT
Start: 2023-03-30 | End: 2023-03-30 | Stop reason: HOSPADM

## 2023-03-30 RX ORDER — ZOLEDRONIC ACID 5 MG/100ML
5 INJECTION, SOLUTION INTRAVENOUS
Status: COMPLETED | OUTPATIENT
Start: 2023-03-30 | End: 2023-03-30

## 2023-03-30 RX ORDER — SODIUM CHLORIDE 0.9 % (FLUSH) 0.9 %
10 SYRINGE (ML) INJECTION
Status: CANCELLED | OUTPATIENT
Start: 2023-03-30

## 2023-03-30 RX ORDER — SODIUM CHLORIDE 0.9 % (FLUSH) 0.9 %
10 SYRINGE (ML) INJECTION
Status: DISCONTINUED | OUTPATIENT
Start: 2023-03-30 | End: 2023-03-30 | Stop reason: HOSPADM

## 2023-03-30 RX ORDER — ZOLEDRONIC ACID 5 MG/100ML
5 INJECTION, SOLUTION INTRAVENOUS
Status: CANCELLED | OUTPATIENT
Start: 2023-03-30

## 2023-03-30 RX ADMIN — ZOLEDRONIC ACID 5 MG: 5 INJECTION, SOLUTION INTRAVENOUS at 08:03

## 2023-03-30 RX ADMIN — SODIUM CHLORIDE: 9 INJECTION, SOLUTION INTRAVENOUS at 08:03

## 2023-03-31 DIAGNOSIS — J44.9 CHRONIC OBSTRUCTIVE PULMONARY DISEASE, UNSPECIFIED COPD TYPE: Chronic | ICD-10-CM

## 2023-03-31 RX ORDER — ALBUTEROL SULFATE 0.83 MG/ML
SOLUTION RESPIRATORY (INHALATION)
Qty: 150 ML | Refills: 0 | Status: SHIPPED | OUTPATIENT
Start: 2023-03-31 | End: 2023-06-21

## 2023-03-31 RX ORDER — ALBUTEROL SULFATE 90 UG/1
AEROSOL, METERED RESPIRATORY (INHALATION)
Qty: 18 G | Refills: 98 | Status: SHIPPED | OUTPATIENT
Start: 2023-03-31

## 2023-03-31 RX ORDER — ALBUTEROL SULFATE 90 UG/1
AEROSOL, METERED RESPIRATORY (INHALATION)
Qty: 18 G | Refills: 99 | Status: SHIPPED | OUTPATIENT
Start: 2023-03-31 | End: 2023-03-31

## 2023-05-25 ENCOUNTER — HOSPITAL ENCOUNTER (OUTPATIENT)
Dept: RADIOLOGY | Facility: HOSPITAL | Age: 63
Discharge: HOME | End: 2023-05-25
Attending: NURSE PRACTITIONER
Payer: COMMERCIAL

## 2023-05-25 DIAGNOSIS — E53.9 VITAMIN B-COMPLEX DEFICIENCY: ICD-10-CM

## 2023-05-25 DIAGNOSIS — Z00.00 ROUTINE GENERAL MEDICAL EXAMINATION AT A HEALTH CARE FACILITY: ICD-10-CM

## 2023-05-25 DIAGNOSIS — K50.80 REGIONAL ENTERITIS OF SMALL INTESTINE WITH LARGE INTESTINE: ICD-10-CM

## 2023-05-25 DIAGNOSIS — Z79.899 ENCOUNTER FOR LONG-TERM (CURRENT) USE OF OTHER MEDICATIONS: ICD-10-CM

## 2023-05-25 DIAGNOSIS — E55.9 AVITAMINOSIS D: ICD-10-CM

## 2023-05-25 LAB
CHOLEST SERPL-MSCNC: 234 MG/DL (ref 0–200)
HDLC SERPL-MCNC: 74 MG/DL (ref 35–70)
LDLC SERPL CALC-MCNC: 148 MG/DL (ref 0–160)
TRIGL SERPL-MCNC: 62 MG/DL (ref 40–160)

## 2023-05-25 PROCEDURE — 71046 X-RAY EXAM CHEST 2 VIEWS: CPT | Mod: TC

## 2023-06-04 NOTE — TELEPHONE ENCOUNTER
----- Message from Margareth Cisneros sent at 6/2/2022  3:36 PM CDT -----  Regarding: [facility]: Appt  STOUT PT       Pt called stating that she cancelled an appt with Dr. Xiao. I do not see an appt in Epic. Please advise if she needs a new referral.   Pt #[phone]     
Mirta, can you schedule an appointment next available for Ms. Kim? DX Osteoporosis and Vit D deficiency.  Thanks.  
Phoned patient left voicemail with appointment date and time and also informed that Dr. Xiao will order testing once seen.  Appointment card mailed to patient.  
Recommend scheduling a follow up appt and will sort then.  If our wait is too long for her preferenct, offer to refer to one of the other private endos who can get her in sooner.  
Spoke with Ms. Kim she had to cancel her appointment in April.  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  I will have per placed on the schedule (next available) will have her get labs and dxa scan. For you to review and if necessary we could work her into a sooner spot.  
no

## 2023-06-15 ENCOUNTER — DOCUMENTATION ONLY (OUTPATIENT)
Dept: FAMILY MEDICINE | Facility: CLINIC | Age: 63
End: 2023-06-15
Payer: COMMERCIAL

## 2023-06-19 DIAGNOSIS — I10 PRIMARY HYPERTENSION: Primary | Chronic | ICD-10-CM

## 2023-06-20 DIAGNOSIS — J44.9 CHRONIC OBSTRUCTIVE PULMONARY DISEASE, UNSPECIFIED COPD TYPE: Chronic | ICD-10-CM

## 2023-06-20 RX ORDER — LISINOPRIL AND HYDROCHLOROTHIAZIDE 20; 25 MG/1; MG/1
1 TABLET ORAL DAILY
Qty: 90 TABLET | Refills: 0 | Status: SHIPPED | OUTPATIENT
Start: 2023-06-20 | End: 2023-08-24 | Stop reason: SDUPTHER

## 2023-06-21 RX ORDER — ALBUTEROL SULFATE 0.83 MG/ML
SOLUTION RESPIRATORY (INHALATION)
Qty: 150 ML | Refills: 0 | Status: SHIPPED | OUTPATIENT
Start: 2023-06-21 | End: 2023-07-20

## 2023-07-20 DIAGNOSIS — J44.9 CHRONIC OBSTRUCTIVE PULMONARY DISEASE, UNSPECIFIED COPD TYPE: Chronic | ICD-10-CM

## 2023-07-20 RX ORDER — ALBUTEROL SULFATE 0.83 MG/ML
SOLUTION RESPIRATORY (INHALATION)
Qty: 150 ML | Refills: 0 | Status: SHIPPED | OUTPATIENT
Start: 2023-07-20 | End: 2023-08-21

## 2023-07-20 NOTE — TELEPHONE ENCOUNTER
Patient has a new patient appointment with Dr. Anita Mcqueen on 08/24/23. Will forward to Dr. Mcqueen.

## 2023-08-21 DIAGNOSIS — J44.9 CHRONIC OBSTRUCTIVE PULMONARY DISEASE, UNSPECIFIED COPD TYPE: Chronic | ICD-10-CM

## 2023-08-21 RX ORDER — ALBUTEROL SULFATE 0.83 MG/ML
SOLUTION RESPIRATORY (INHALATION)
Qty: 150 ML | Refills: 0 | Status: SHIPPED | OUTPATIENT
Start: 2023-08-21 | End: 2023-10-24

## 2023-08-24 ENCOUNTER — OFFICE VISIT (OUTPATIENT)
Dept: FAMILY MEDICINE | Facility: CLINIC | Age: 63
End: 2023-08-24
Payer: COMMERCIAL

## 2023-08-24 VITALS
BODY MASS INDEX: 19.91 KG/M2 | DIASTOLIC BLOOD PRESSURE: 72 MMHG | RESPIRATION RATE: 16 BRPM | SYSTOLIC BLOOD PRESSURE: 118 MMHG | HEIGHT: 62 IN | OXYGEN SATURATION: 97 % | HEART RATE: 70 BPM | TEMPERATURE: 98 F | WEIGHT: 108.19 LBS

## 2023-08-24 DIAGNOSIS — F41.1 GENERALIZED ANXIETY DISORDER: Chronic | ICD-10-CM

## 2023-08-24 DIAGNOSIS — J44.9 CHRONIC OBSTRUCTIVE PULMONARY DISEASE, UNSPECIFIED COPD TYPE: ICD-10-CM

## 2023-08-24 DIAGNOSIS — R56.9 SEIZURE: ICD-10-CM

## 2023-08-24 DIAGNOSIS — M81.0 OSTEOPOROSIS, UNSPECIFIED OSTEOPOROSIS TYPE, UNSPECIFIED PATHOLOGICAL FRACTURE PRESENCE: ICD-10-CM

## 2023-08-24 DIAGNOSIS — Z00.00 WELLNESS EXAMINATION: Primary | ICD-10-CM

## 2023-08-24 DIAGNOSIS — K50.919 CROHN'S DISEASE WITH COMPLICATION, UNSPECIFIED GASTROINTESTINAL TRACT LOCATION: ICD-10-CM

## 2023-08-24 DIAGNOSIS — Z72.0 TOBACCO USER: Chronic | ICD-10-CM

## 2023-08-24 DIAGNOSIS — E55.9 VITAMIN D DEFICIENCY: ICD-10-CM

## 2023-08-24 DIAGNOSIS — J41.0 SIMPLE CHRONIC BRONCHITIS: Chronic | ICD-10-CM

## 2023-08-24 DIAGNOSIS — I10 PRIMARY HYPERTENSION: Chronic | ICD-10-CM

## 2023-08-24 DIAGNOSIS — Z78.0 POSTMENOPAUSAL: ICD-10-CM

## 2023-08-24 DIAGNOSIS — E53.9 VITAMIN B DEFICIENCY, UNSPECIFIED: ICD-10-CM

## 2023-08-24 DIAGNOSIS — Z12.31 BREAST CANCER SCREENING BY MAMMOGRAM: ICD-10-CM

## 2023-08-24 DIAGNOSIS — Z12.11 COLON CANCER SCREENING: ICD-10-CM

## 2023-08-24 PROBLEM — F17.290 NICOTINE DEPENDENCE, OTHER TOBACCO PRODUCT, UNCOMPLICATED: Status: RESOLVED | Noted: 2023-02-23 | Resolved: 2023-08-24

## 2023-08-24 PROBLEM — R53.83 FATIGUE: Status: RESOLVED | Noted: 2023-02-23 | Resolved: 2023-08-24

## 2023-08-24 PROBLEM — R82.90 FOUL SMELLING URINE: Status: RESOLVED | Noted: 2023-02-23 | Resolved: 2023-08-24

## 2023-08-24 PROBLEM — G47.00 INSOMNIA: Status: RESOLVED | Noted: 2023-02-23 | Resolved: 2023-08-24

## 2023-08-24 PROBLEM — Z23 NEED FOR IMMUNIZATION AGAINST INFLUENZA: Status: RESOLVED | Noted: 2022-08-17 | Resolved: 2023-08-24

## 2023-08-24 PROBLEM — R09.02 HYPOXIA: Status: RESOLVED | Noted: 2022-08-17 | Resolved: 2023-08-24

## 2023-08-24 PROBLEM — K50.10 CROHN'S DISEASE OF LARGE INTESTINE WITHOUT COMPLICATIONS: Status: RESOLVED | Noted: 2023-02-23 | Resolved: 2023-08-24

## 2023-08-24 PROCEDURE — 3288F FALL RISK ASSESSMENT DOCD: CPT | Mod: CPTII,,, | Performed by: FAMILY MEDICINE

## 2023-08-24 PROCEDURE — 3074F PR MOST RECENT SYSTOLIC BLOOD PRESSURE < 130 MM HG: ICD-10-PCS | Mod: CPTII,,, | Performed by: FAMILY MEDICINE

## 2023-08-24 PROCEDURE — 99397 PER PM REEVAL EST PAT 65+ YR: CPT | Mod: ,,, | Performed by: FAMILY MEDICINE

## 2023-08-24 PROCEDURE — 99397 PR PREVENTIVE VISIT,EST,65 & OVER: ICD-10-PCS | Mod: ,,, | Performed by: FAMILY MEDICINE

## 2023-08-24 PROCEDURE — 3288F PR FALLS RISK ASSESSMENT DOCUMENTED: ICD-10-PCS | Mod: CPTII,,, | Performed by: FAMILY MEDICINE

## 2023-08-24 PROCEDURE — 4010F PR ACE/ARB THEARPY RXD/TAKEN: ICD-10-PCS | Mod: CPTII,,, | Performed by: FAMILY MEDICINE

## 2023-08-24 PROCEDURE — 1101F PT FALLS ASSESS-DOCD LE1/YR: CPT | Mod: CPTII,,, | Performed by: FAMILY MEDICINE

## 2023-08-24 PROCEDURE — 3008F PR BODY MASS INDEX (BMI) DOCUMENTED: ICD-10-PCS | Mod: CPTII,,, | Performed by: FAMILY MEDICINE

## 2023-08-24 PROCEDURE — 3078F DIAST BP <80 MM HG: CPT | Mod: CPTII,,, | Performed by: FAMILY MEDICINE

## 2023-08-24 PROCEDURE — 3078F PR MOST RECENT DIASTOLIC BLOOD PRESSURE < 80 MM HG: ICD-10-PCS | Mod: CPTII,,, | Performed by: FAMILY MEDICINE

## 2023-08-24 PROCEDURE — 3008F BODY MASS INDEX DOCD: CPT | Mod: CPTII,,, | Performed by: FAMILY MEDICINE

## 2023-08-24 PROCEDURE — 1159F PR MEDICATION LIST DOCUMENTED IN MEDICAL RECORD: ICD-10-PCS | Mod: CPTII,,, | Performed by: FAMILY MEDICINE

## 2023-08-24 PROCEDURE — 4010F ACE/ARB THERAPY RXD/TAKEN: CPT | Mod: CPTII,,, | Performed by: FAMILY MEDICINE

## 2023-08-24 PROCEDURE — 1101F PR PT FALLS ASSESS DOC 0-1 FALLS W/OUT INJ PAST YR: ICD-10-PCS | Mod: CPTII,,, | Performed by: FAMILY MEDICINE

## 2023-08-24 PROCEDURE — 1159F MED LIST DOCD IN RCRD: CPT | Mod: CPTII,,, | Performed by: FAMILY MEDICINE

## 2023-08-24 PROCEDURE — 1160F PR REVIEW ALL MEDS BY PRESCRIBER/CLIN PHARMACIST DOCUMENTED: ICD-10-PCS | Mod: CPTII,,, | Performed by: FAMILY MEDICINE

## 2023-08-24 PROCEDURE — 3074F SYST BP LT 130 MM HG: CPT | Mod: CPTII,,, | Performed by: FAMILY MEDICINE

## 2023-08-24 PROCEDURE — 1160F RVW MEDS BY RX/DR IN RCRD: CPT | Mod: CPTII,,, | Performed by: FAMILY MEDICINE

## 2023-08-24 RX ORDER — LISINOPRIL AND HYDROCHLOROTHIAZIDE 20; 25 MG/1; MG/1
1 TABLET ORAL DAILY
Qty: 90 TABLET | Refills: 3 | Status: SHIPPED | OUTPATIENT
Start: 2023-08-24 | End: 2024-08-23

## 2023-08-24 RX ORDER — PEN NEEDLE, DIABETIC 31 GX5/16"
1 NEEDLE, DISPOSABLE MISCELLANEOUS DAILY PRN
Qty: 1 EACH | Refills: 0 | Status: SHIPPED | OUTPATIENT
Start: 2023-08-24

## 2023-08-24 RX ORDER — KETOROLAC TROMETHAMINE 10 MG/1
10 TABLET, FILM COATED ORAL EVERY 8 HOURS PRN
COMMUNITY
Start: 2023-08-15 | End: 2023-08-24

## 2023-08-24 RX ORDER — FLUTICASONE PROPIONATE AND SALMETEROL 250; 50 UG/1; UG/1
1 POWDER RESPIRATORY (INHALATION) 2 TIMES DAILY
Qty: 60 EACH | Refills: 11 | Status: SHIPPED | OUTPATIENT
Start: 2023-08-24 | End: 2023-09-06

## 2023-08-24 NOTE — ASSESSMENT & PLAN NOTE
Labs 5/2023 reviewed with patient at time of appt today  mmg 12/2022 ordered  dexa 12/2022. Per dr. Xiao  Colonoscopy 1/2020 with dr. drake  Encouraged smoking cessation. Ct lung cancer screening ordered.

## 2023-08-24 NOTE — ASSESSMENT & PLAN NOTE
Still smoking. Encouraged smoking cessation.  No currently seeing pulm.  Is on albuterol neb, albuterol inhaler, spiriva and symbicort.  symbicort was working well but is now too expensive so wants to try another med.  advair sent in. Take as directed. Contact clinic for concerns. Patient is agreeable to plan and verbalized understanding

## 2023-08-24 NOTE — ASSESSMENT & PLAN NOTE
dexa 12/2022 showed osteoporosis.  On reclast per dr. Xiao.  Also on calcium and vit d. Encouraged weight bearing exercises

## 2023-09-06 ENCOUNTER — TELEPHONE (OUTPATIENT)
Dept: FAMILY MEDICINE | Facility: CLINIC | Age: 63
End: 2023-09-06
Payer: COMMERCIAL

## 2023-09-06 DIAGNOSIS — J41.0 SIMPLE CHRONIC BRONCHITIS: Primary | Chronic | ICD-10-CM

## 2023-09-06 RX ORDER — BUDESONIDE, GLYCOPYRROLATE, AND FORMOTEROL FUMARATE 160; 9; 4.8 UG/1; UG/1; UG/1
2 AEROSOL, METERED RESPIRATORY (INHALATION) 2 TIMES DAILY
Qty: 10.7 G | Refills: 11 | Status: SHIPPED | OUTPATIENT
Start: 2023-09-06

## 2023-09-06 NOTE — TELEPHONE ENCOUNTER
Please have her also discontinue the spiriva as the bretzi should take the place of it and the advair          I have signed for the following orders AND/OR meds.  Please call the patient and ask the patient to schedule the testing AND/OR inform about any medications that were sent.      Medications Ordered This Encounter   Medications    budesonide-glycopyr-formoterol (BREZTRI AEROSPHERE) 160-9-4.8 mcg/actuation HFAA     Sig: Inhale 2 puffs into the lungs 2 (two) times a day.     Dispense:  10.7 g     Refill:  11

## 2023-09-06 NOTE — TELEPHONE ENCOUNTER
----- Message from Ani Aviles sent at 9/6/2023  9:02 AM CDT -----  Regarding: med advice  .Type:  Needs Medical Advice    Who Called: Pt  Symptoms (please be specific):    How long has patient had these symptoms:    Pharmacy name and phone #: [id number]  [place], [place] - [address]   Would the patient rather a call back or a response via [facility]? Call back  Best Call Back Number: [phone]  Additional Information: Pt states that fluticasone-salmeterol diskus inhaler 250-50 mcg is not working for her, would like to try the Breztri inhaler if ins will cover.

## 2023-10-24 DIAGNOSIS — J44.9 CHRONIC OBSTRUCTIVE PULMONARY DISEASE, UNSPECIFIED COPD TYPE: Chronic | ICD-10-CM

## 2023-10-24 RX ORDER — ALBUTEROL SULFATE 0.83 MG/ML
SOLUTION RESPIRATORY (INHALATION)
Qty: 150 ML | Refills: 5 | Status: SHIPPED | OUTPATIENT
Start: 2023-10-24

## 2023-11-14 PROBLEM — G40.909 SEIZURE DISORDER: Status: ACTIVE | Noted: 2022-08-17

## 2023-11-14 NOTE — PROGRESS NOTES
Subjective     Patient ID: Judy Vides is a 63 y.o. female.    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[place]"    Review of Systems  A 14pt ROS was reviewed & is negative unless otherwise documented in the HPI       Objective     Physical Exam  GENERAL: NAD, calm, cooperative, appropriate  Awake/alert  Well groomed  RESP: CTAB  HEART: RRR  No LE edema  MENTAL STATUS: oriented, follow commands reliably  SPEECH/LANGUAGE: clear, fluent  CN:  Perrla, eomi, vff, gaze conjugate  No tactile or motor facial asymmetry  Tongue protrudes midline  Motor: no focal weakness  Cerebellar: no tremor or dysmetria  Sensory: normal to tactile stim/vibration  DTRs: normal +2, symmetric  Gait: steady       Assessment and Plan     1. Seizure disorder  -     OXcarbazepine (TRILEPTAL) 600 MG Tab; Take 2 tablets (1,200 mg total) by mouth every evening.  Dispense: 180 tablet; Refill: 4  -     Oxcarbazepine level; Future; Expected date: 11/16/2023    2. Anxiety  -     busPIRone (BUSPAR) 5 MG Tab; Take 1 tablet (5 mg total) by mouth 3 (three) times daily as needed (anxiety).  Dispense: 90 tablet; Refill: 4    3. Therapeutic drug monitoring  -     Oxcarbazepine level; Future; Expected date: 11/16/2023      Cont oxcarb 1200mg qHS  Cont buspar 5mg tid prn anxiety  Ck oxcarb level with her next lab draw  Ok to RF oxcarb in a year  F/u 2y    TOMAS Noble-BC         Follow up in about 1 year (around 11/16/2024).

## 2023-11-16 ENCOUNTER — OFFICE VISIT (OUTPATIENT)
Dept: NEUROLOGY | Facility: CLINIC | Age: 63
End: 2023-11-16
Payer: COMMERCIAL

## 2023-11-16 VITALS
SYSTOLIC BLOOD PRESSURE: 132 MMHG | HEIGHT: 62 IN | WEIGHT: 108 LBS | BODY MASS INDEX: 19.88 KG/M2 | DIASTOLIC BLOOD PRESSURE: 68 MMHG

## 2023-11-16 DIAGNOSIS — G40.909 SEIZURE DISORDER: Primary | ICD-10-CM

## 2023-11-16 DIAGNOSIS — Z51.81 THERAPEUTIC DRUG MONITORING: ICD-10-CM

## 2023-11-16 DIAGNOSIS — F41.9 ANXIETY: ICD-10-CM

## 2023-11-16 PROCEDURE — 3078F PR MOST RECENT DIASTOLIC BLOOD PRESSURE < 80 MM HG: ICD-10-PCS | Mod: CPTII,S$GLB,, | Performed by: NURSE PRACTITIONER

## 2023-11-16 PROCEDURE — 4010F PR ACE/ARB THEARPY RXD/TAKEN: ICD-10-PCS | Mod: CPTII,S$GLB,, | Performed by: NURSE PRACTITIONER

## 2023-11-16 PROCEDURE — 3075F PR MOST RECENT SYSTOLIC BLOOD PRESS GE 130-139MM HG: ICD-10-PCS | Mod: CPTII,S$GLB,, | Performed by: NURSE PRACTITIONER

## 2023-11-16 PROCEDURE — 3075F SYST BP GE 130 - 139MM HG: CPT | Mod: CPTII,S$GLB,, | Performed by: NURSE PRACTITIONER

## 2023-11-16 PROCEDURE — 3078F DIAST BP <80 MM HG: CPT | Mod: CPTII,S$GLB,, | Performed by: NURSE PRACTITIONER

## 2023-11-16 PROCEDURE — 1159F PR MEDICATION LIST DOCUMENTED IN MEDICAL RECORD: ICD-10-PCS | Mod: CPTII,S$GLB,, | Performed by: NURSE PRACTITIONER

## 2023-11-16 PROCEDURE — 3008F PR BODY MASS INDEX (BMI) DOCUMENTED: ICD-10-PCS | Mod: CPTII,S$GLB,, | Performed by: NURSE PRACTITIONER

## 2023-11-16 PROCEDURE — 1159F MED LIST DOCD IN RCRD: CPT | Mod: CPTII,S$GLB,, | Performed by: NURSE PRACTITIONER

## 2023-11-16 PROCEDURE — 1101F PT FALLS ASSESS-DOCD LE1/YR: CPT | Mod: CPTII,S$GLB,, | Performed by: NURSE PRACTITIONER

## 2023-11-16 PROCEDURE — 99214 OFFICE O/P EST MOD 30 MIN: CPT | Mod: S$GLB,,, | Performed by: NURSE PRACTITIONER

## 2023-11-16 PROCEDURE — 4010F ACE/ARB THERAPY RXD/TAKEN: CPT | Mod: CPTII,S$GLB,, | Performed by: NURSE PRACTITIONER

## 2023-11-16 PROCEDURE — 99999 PR PBB SHADOW E&M-EST. PATIENT-LVL IV: CPT | Mod: PBBFAC,,, | Performed by: NURSE PRACTITIONER

## 2023-11-16 PROCEDURE — 99214 PR OFFICE/OUTPT VISIT, EST, LEVL IV, 30-39 MIN: ICD-10-PCS | Mod: S$GLB,,, | Performed by: NURSE PRACTITIONER

## 2023-11-16 PROCEDURE — 3288F PR FALLS RISK ASSESSMENT DOCUMENTED: ICD-10-PCS | Mod: CPTII,S$GLB,, | Performed by: NURSE PRACTITIONER

## 2023-11-16 PROCEDURE — 3288F FALL RISK ASSESSMENT DOCD: CPT | Mod: CPTII,S$GLB,, | Performed by: NURSE PRACTITIONER

## 2023-11-16 PROCEDURE — 1160F PR REVIEW ALL MEDS BY PRESCRIBER/CLIN PHARMACIST DOCUMENTED: ICD-10-PCS | Mod: CPTII,S$GLB,, | Performed by: NURSE PRACTITIONER

## 2023-11-16 PROCEDURE — 1160F RVW MEDS BY RX/DR IN RCRD: CPT | Mod: CPTII,S$GLB,, | Performed by: NURSE PRACTITIONER

## 2023-11-16 PROCEDURE — 3008F BODY MASS INDEX DOCD: CPT | Mod: CPTII,S$GLB,, | Performed by: NURSE PRACTITIONER

## 2023-11-16 PROCEDURE — 1101F PR PT FALLS ASSESS DOC 0-1 FALLS W/OUT INJ PAST YR: ICD-10-PCS | Mod: CPTII,S$GLB,, | Performed by: NURSE PRACTITIONER

## 2023-11-16 PROCEDURE — 99999 PR PBB SHADOW E&M-EST. PATIENT-LVL IV: ICD-10-PCS | Mod: PBBFAC,,, | Performed by: NURSE PRACTITIONER

## 2023-11-16 RX ORDER — OXCARBAZEPINE 600 MG/1
1200 TABLET, FILM COATED ORAL NIGHTLY
Qty: 180 TABLET | Refills: 4 | Status: SHIPPED | OUTPATIENT
Start: 2023-11-16 | End: 2025-02-08

## 2023-11-16 RX ORDER — ZOLEDRONIC ACID 5 MG/100ML
5 INJECTION, SOLUTION INTRAVENOUS ONCE
COMMUNITY

## 2023-11-16 RX ORDER — CALCIUM CARBONATE 600 MG
1200 TABLET ORAL ONCE
COMMUNITY

## 2023-11-16 RX ORDER — BUSPIRONE HYDROCHLORIDE 5 MG/1
5 TABLET ORAL 3 TIMES DAILY PRN
Qty: 90 TABLET | Refills: 4 | Status: SHIPPED | OUTPATIENT
Start: 2023-11-16

## 2023-11-16 NOTE — PATIENT INSTRUCTIONS
"Patient Education       Seizures   The Basics   Written by the doctors and editors at [facility]   What are seizures? -- Seizures are waves of abnormal electrical activity in the brain. Seizures can make you pass out, or move or behave strangely. Most seizures last only a few seconds or minutes.  Epilepsy is a condition that causes people to have repeated seizures. But not everyone who has had a seizure has epilepsy. Problems such as low blood sugar or infection can also cause seizures. Other problems such as anxiety or fainting spells can cause events that look like seizures.  What are the symptoms of a seizure? -- There are different kinds of seizures. Each causes a different set of symptoms.  People who have "tonic clonic" or "grand mal" seizures often get stiff and then have jerking movements. People who have other types of seizures have less dramatic changes. For instance, some people have shaking movements in just 1 arm or in a part of their face. Other people suddenly stop responding and stare for a few seconds.  Should I see a doctor or nurse if I have a seizure? -- If you have never had a seizure before and you have one, you (or whoever is with you) should call for an ambulance (in the [place] and [place], dial 9-1-1). Having a seizure can be a sign that something is wrong with your brain.  How are seizures treated? -- The right treatment for seizures depends on what is causing them. If you have seizures because of an infection, you will probably need treatments to get rid of the infection. On the other hand, if you have repeated seizures because of epilepsy, you will probably need anti-seizure medicines, also called "anti-convulsants."  People sometimes need to try different medicines before they find a treatment that works well. Seizures can be hard to control. But if you work with your doctor, chances are good that you will find a treatment that works.  Do anti-seizure medicines cause side effects? -- Yes. " "Anti-seizure medicines can cause side effects. They can make you feel tired or clumsy, or cause other problems. If you are bothered by side effects, tell your doctor about it. They can work with you to find the medicine or dose that causes the fewest problems. Most of the side effects from these medicines are mild. But there are two side effects that are very serious but rare:  Anti-seizure medicines can cause a rare but serious skin rash. Tell your doctor or nurse right away if you notice a new rash while taking an anti-seizure medicine.  Anti-seizure medicines can increase the risk of becoming suicidal (wanting to kill yourself). Tell your doctor or nurse right away if you start to feel depressed or have thoughts of harming yourself.  What if anti-seizure medicines do not work for me? -- If you keep having seizures even after trying different medicines, you might have other options. Some people can have surgery to remove the small part of their brain that is causing seizures. Others get a device called a "vagus nerve stimulator" put in their chest to help control seizures.  A special diet, called the "ketogenic diet," might be helpful for some people. This diet involves eating foods that are high in fat but avoiding carbohydrates. If you are interested in trying this kind of diet, your doctor or nurse can talk to you about how to do this safely.   What can I do to keep myself safe? -- Until you have your seizures under control, do not drive. The laws that say when a person with seizures can drive are different depending on where the person lives. Ask your doctor if you can safely drive and about the laws where you live.  Also, if your seizures are not under control, make sure to take other safety steps. For example, do not swim without someone else nearby who could help you if you started having a seizure. And avoid activities that could result in you falling from a height.  How can I reduce my chances of having " more seizures? -- You can:  Take your medicines exactly as directed - at the right times, and at the right doses.  Tell your doctor about any side effects you have. That way you can work together to find the best medicine for you.  Be careful not to let your prescription run out. (Stopping anti-seizure medicine suddenly can put you at risk of seizures.)  While on anti-seizure medicines, check with your doctor before starting any new medicines. Anti-seizure medicines can interact with prescription and non-prescription medicines, and with herbal drugs. Mixing them can increase side effects or make them not work as well.  Avoid alcohol. Alcohol can increase the risk of seizures, affect the way seizure medicines work, and increase side effects from anti-seizure medicines.  What should other people do if they see me having a seizure? -- Ask your doctor what your family members, friends, or coworkers should do if you have a seizure. Some people will have seizures from time to time, and they might not need to see a doctor every time. But if you have a seizure that lasts longer than 5 minutes or if you do not wake up after a seizure, someone should call for an ambulance (in the [place] and [place], dial 9-1-1).  Other people should not try to put anything in your mouth while you are having a seizure. But they should make sure you do not bang against any hard surfaces.  What if I want to get pregnant? -- If you take anti-seizure medicines, speak to your doctor or nurse before you start trying to get pregnant. Some anti-seizure medicines can hurt an unborn baby. You might need to switch medicines before you get pregnant.  All topics are updated as new evidence becomes available and our peer review process is complete.  This topic retrieved from [employer] on: Sep 21, 2021.  Topic 65279 Version 17.0  Release: 29.4.2 - C29.263  © 2021 UpToDate, Inc. and/or its affiliates. All rights reserved.  Consumer Information Use and Disclaimer    This information is not specific medical advice and does not replace information you receive from your health care provider. This is only a brief summary of general information. It does NOT include all information about conditions, illnesses, injuries, tests, procedures, treatments, therapies, discharge instructions or life-style choices that may apply to you. You must talk with your health care provider for complete information about your health and treatment options. This information should not be used to decide whether or not to accept your health care provider's advice, instructions or recommendations. Only your health care provider has the knowledge and training to provide advice that is right for you. The use of this information is governed by the [employer] End User License Agreement, available at https://www.[employer] Funds/en/solutions/Vhall/about/romeo.The use of [employer] content is governed by the [employer] Terms of Use. ©2021 UpToDate, Inc. All rights reserved.  Copyright   © 2021 UpToDate, Inc. and/or its affiliates. All rights reserved.

## 2023-11-27 PROBLEM — Z00.00 WELLNESS EXAMINATION: Status: RESOLVED | Noted: 2023-08-24 | Resolved: 2023-11-27

## 2024-03-05 ENCOUNTER — LAB VISIT (OUTPATIENT)
Dept: LAB | Facility: HOSPITAL | Age: 64
End: 2024-03-05
Attending: NURSE PRACTITIONER
Payer: COMMERCIAL

## 2024-03-05 DIAGNOSIS — E53.9 VITAMIN B-COMPLEX DEFICIENCY: ICD-10-CM

## 2024-03-05 DIAGNOSIS — Z79.899 ENCOUNTER FOR LONG-TERM (CURRENT) USE OF OTHER MEDICATIONS: Primary | ICD-10-CM

## 2024-03-05 DIAGNOSIS — M81.0 SENILE OSTEOPOROSIS: ICD-10-CM

## 2024-03-05 DIAGNOSIS — J44.9 VANISHING LUNG: ICD-10-CM

## 2024-03-05 DIAGNOSIS — E55.9 AVITAMINOSIS D: ICD-10-CM

## 2024-03-05 DIAGNOSIS — Z51.81 THERAPEUTIC DRUG MONITORING: ICD-10-CM

## 2024-03-05 DIAGNOSIS — K50.80 REGIONAL ENTERITIS OF SMALL INTESTINE WITH LARGE INTESTINE: ICD-10-CM

## 2024-03-05 DIAGNOSIS — G40.909 SEIZURE DISORDER: ICD-10-CM

## 2024-03-05 DIAGNOSIS — F17.200 TOBACCO USE DISORDER: ICD-10-CM

## 2024-03-05 DIAGNOSIS — E78.5 HYPERLIPIDEMIA, UNSPECIFIED HYPERLIPIDEMIA TYPE: ICD-10-CM

## 2024-03-05 LAB
ALBUMIN SERPL-MCNC: 3.6 G/DL (ref 3.4–4.8)
ALBUMIN/GLOB SERPL: 1.4 RATIO (ref 1.1–2)
ALP SERPL-CCNC: 83 UNIT/L (ref 40–150)
ALT SERPL-CCNC: 15 UNIT/L (ref 0–55)
AST SERPL-CCNC: 15 UNIT/L (ref 5–34)
BASOPHILS # BLD AUTO: 0.09 X10(3)/MCL
BASOPHILS NFR BLD AUTO: 1 %
BILIRUB SERPL-MCNC: 0.2 MG/DL
BUN SERPL-MCNC: 25.9 MG/DL (ref 9.8–20.1)
CALCIUM SERPL-MCNC: 9.1 MG/DL (ref 8.4–10.2)
CHLORIDE SERPL-SCNC: 103 MMOL/L (ref 98–107)
CHOLEST SERPL-MCNC: 215 MG/DL
CHOLEST/HDLC SERPL: 3 {RATIO} (ref 0–5)
CO2 SERPL-SCNC: 28 MMOL/L (ref 23–31)
CREAT SERPL-MCNC: 0.73 MG/DL (ref 0.55–1.02)
CRP SERPL-MCNC: 5.1 MG/L
DEPRECATED CALCIDIOL+CALCIFEROL SERPL-MC: 34.2 NG/ML (ref 30–80)
EOSINOPHIL # BLD AUTO: 0.2 X10(3)/MCL (ref 0–0.9)
EOSINOPHIL NFR BLD AUTO: 2.2 %
ERYTHROCYTE [DISTWIDTH] IN BLOOD BY AUTOMATED COUNT: 13.2 % (ref 11.5–17)
GFR SERPLBLD CREATININE-BSD FMLA CKD-EPI: >60 MLS/MIN/1.73/M2
GLOBULIN SER-MCNC: 2.6 GM/DL (ref 2.4–3.5)
GLUCOSE SERPL-MCNC: 108 MG/DL (ref 82–115)
HCT VFR BLD AUTO: 45.4 % (ref 37–47)
HDLC SERPL-MCNC: 73 MG/DL (ref 35–60)
HGB BLD-MCNC: 16.2 G/DL (ref 12–16)
IMM GRANULOCYTES # BLD AUTO: 0.03 X10(3)/MCL (ref 0–0.04)
IMM GRANULOCYTES NFR BLD AUTO: 0.3 %
IRON SATN MFR SERPL: 21 % (ref 20–50)
IRON SERPL-MCNC: 53 UG/DL (ref 50–170)
LDLC SERPL CALC-MCNC: 129 MG/DL (ref 50–140)
LYMPHOCYTES # BLD AUTO: 1.24 X10(3)/MCL (ref 0.6–4.6)
LYMPHOCYTES NFR BLD AUTO: 13.6 %
MCH RBC QN AUTO: 33.8 PG (ref 27–31)
MCHC RBC AUTO-ENTMCNC: 35.7 G/DL (ref 33–36)
MCV RBC AUTO: 94.8 FL (ref 80–94)
MONOCYTES # BLD AUTO: 0.56 X10(3)/MCL (ref 0.1–1.3)
MONOCYTES NFR BLD AUTO: 6.2 %
NEUTROPHILS # BLD AUTO: 6.98 X10(3)/MCL (ref 2.1–9.2)
NEUTROPHILS NFR BLD AUTO: 76.7 %
NRBC BLD AUTO-RTO: 0 %
PHOSPHATE SERPL-MCNC: 3.7 MG/DL (ref 2.3–4.7)
PLATELET # BLD AUTO: 497 X10(3)/MCL (ref 130–400)
PMV BLD AUTO: 10 FL (ref 7.4–10.4)
POTASSIUM SERPL-SCNC: 4.3 MMOL/L (ref 3.5–5.1)
PROT SERPL-MCNC: 6.2 GM/DL (ref 5.8–7.6)
RBC # BLD AUTO: 4.79 X10(6)/MCL (ref 4.2–5.4)
SODIUM SERPL-SCNC: 138 MMOL/L (ref 136–145)
TIBC SERPL-MCNC: 200 UG/DL (ref 70–310)
TIBC SERPL-MCNC: 253 UG/DL (ref 250–450)
TRANSFERRIN SERPL-MCNC: 235 MG/DL (ref 173–360)
TRIGL SERPL-MCNC: 66 MG/DL (ref 37–140)
VIT B12 SERPL-MCNC: 742 PG/ML (ref 213–816)
VLDLC SERPL CALC-MCNC: 13 MG/DL
WBC # SPEC AUTO: 9.1 X10(3)/MCL (ref 4.5–11.5)

## 2024-03-05 PROCEDURE — 86140 C-REACTIVE PROTEIN: CPT

## 2024-03-05 PROCEDURE — 82306 VITAMIN D 25 HYDROXY: CPT

## 2024-03-05 PROCEDURE — 85025 COMPLETE CBC W/AUTO DIFF WBC: CPT

## 2024-03-05 PROCEDURE — 83540 ASSAY OF IRON: CPT

## 2024-03-05 PROCEDURE — 84100 ASSAY OF PHOSPHORUS: CPT

## 2024-03-05 PROCEDURE — 80053 COMPREHEN METABOLIC PANEL: CPT

## 2024-03-05 PROCEDURE — 36415 COLL VENOUS BLD VENIPUNCTURE: CPT

## 2024-03-05 PROCEDURE — 80183 DRUG SCRN QUANT OXCARBAZEPIN: CPT

## 2024-03-05 PROCEDURE — 80061 LIPID PANEL: CPT

## 2024-03-05 PROCEDURE — 82607 VITAMIN B-12: CPT

## 2024-03-07 LAB — 10OH-CARBAZEPINE SERPL-MCNC: 35 MCG/ML (ref 10–35)

## 2024-04-30 DIAGNOSIS — J44.9 CHRONIC OBSTRUCTIVE PULMONARY DISEASE, UNSPECIFIED COPD TYPE: Chronic | ICD-10-CM

## 2024-04-30 RX ORDER — ALBUTEROL SULFATE 90 UG/1
2 AEROSOL, METERED RESPIRATORY (INHALATION) EVERY 6 HOURS PRN
Qty: 18 G | Refills: 11 | Status: SHIPPED | OUTPATIENT
Start: 2024-04-30

## 2024-04-30 NOTE — TELEPHONE ENCOUNTER
----- Message from Analia Alexandre sent at 4/30/2024  3:58 PM CDT -----  Regarding: refill  Type:  Needs Medical Advice    Who Called: pt   Pharmacy name and phone #:  [facility] [address]   Would the patient rather a call back or a response via [facility]? C/b   Best Call Back Number: [phone]  Additional Information: pt is calling because she needs a refill on her inhaler

## 2024-05-13 ENCOUNTER — OFFICE VISIT (OUTPATIENT)
Dept: FAMILY MEDICINE | Facility: CLINIC | Age: 64
End: 2024-05-13
Payer: COMMERCIAL

## 2024-05-13 VITALS
WEIGHT: 114.88 LBS | TEMPERATURE: 98 F | OXYGEN SATURATION: 95 % | BODY MASS INDEX: 21.14 KG/M2 | HEART RATE: 74 BPM | HEIGHT: 62 IN | DIASTOLIC BLOOD PRESSURE: 70 MMHG | RESPIRATION RATE: 20 BRPM | SYSTOLIC BLOOD PRESSURE: 123 MMHG

## 2024-05-13 DIAGNOSIS — I10 PRIMARY HYPERTENSION: Primary | Chronic | ICD-10-CM

## 2024-05-13 PROCEDURE — 1160F RVW MEDS BY RX/DR IN RCRD: CPT | Mod: CPTII,,, | Performed by: NURSE PRACTITIONER

## 2024-05-13 PROCEDURE — 1159F MED LIST DOCD IN RCRD: CPT | Mod: CPTII,,, | Performed by: NURSE PRACTITIONER

## 2024-05-13 PROCEDURE — 1126F AMNT PAIN NOTED NONE PRSNT: CPT | Mod: CPTII,,, | Performed by: NURSE PRACTITIONER

## 2024-05-13 PROCEDURE — 99213 OFFICE O/P EST LOW 20 MIN: CPT | Mod: ,,, | Performed by: NURSE PRACTITIONER

## 2024-05-13 PROCEDURE — 3074F SYST BP LT 130 MM HG: CPT | Mod: CPTII,,, | Performed by: NURSE PRACTITIONER

## 2024-05-13 PROCEDURE — 1101F PT FALLS ASSESS-DOCD LE1/YR: CPT | Mod: CPTII,,, | Performed by: NURSE PRACTITIONER

## 2024-05-13 PROCEDURE — 3078F DIAST BP <80 MM HG: CPT | Mod: CPTII,,, | Performed by: NURSE PRACTITIONER

## 2024-05-13 PROCEDURE — 3008F BODY MASS INDEX DOCD: CPT | Mod: CPTII,,, | Performed by: NURSE PRACTITIONER

## 2024-05-13 PROCEDURE — 4010F ACE/ARB THERAPY RXD/TAKEN: CPT | Mod: CPTII,,, | Performed by: NURSE PRACTITIONER

## 2024-05-13 PROCEDURE — 3288F FALL RISK ASSESSMENT DOCD: CPT | Mod: CPTII,,, | Performed by: NURSE PRACTITIONER

## 2024-05-13 NOTE — ASSESSMENT & PLAN NOTE
BP at goal  Medication management: Continue current medication (Lisinopril-HCTZ 20-25 mg po daily) as prescribed  She will purchase new BP cuff  Daily BP check; bring log all clinic visits  Instructed to report to ED for any BP greater than 200/100, dizziness, syncope, CP, or SOB  Keep appt. With PCP for follow up  Patient is agreeable to plan and verbalizes understanding

## 2024-05-13 NOTE — PROGRESS NOTES
Subjective:      Patient ID: Judy Vides is a 64 y.o. White female      Chief Complaint: Hypertension       Past Medical History:   Diagnosis Date    Chronic obstructive pulmonary disease 08/17/2022    Crohn disease     Epilepsy     Generalized anxiety disorder 08/17/2022    Headache 08/17/2022    Hypertension     Neck pain 08/17/2022    Osteoporosis     Seizure disorder 08/17/2022    Tobacco user 08/17/2022    Vitamin B deficiency, unspecified 02/23/2023        HPI  Presents to clinic for evaluation of BP.    HTN:  States BP elevated at home at times.  130's140's-/70's.  Currently taking Lisinopril-HCTZ 20-25 mg po daily.  States compliance with medications. Denies any headaches, weakness, dizziness, CP, or SOB. Denies any other problems.            Patient Care Team:  Anita Mcqueen MD as PCP - General (Family Medicine)  Angie Falk NP as Nurse Practitioner (Neurology)  Wanda Cedeño ANP as Nurse Practitioner (Internal Medicine)  Marcello Xiao MD as Consulting Physician (Endocrinology)      Review of Systems   Constitutional: Negative.  Negative for appetite change, chills and fever.   HENT: Negative.     Eyes: Negative.  Negative for discharge and redness.   Respiratory: Negative.  Negative for shortness of breath.    Cardiovascular: Negative.  Negative for chest pain.   Gastrointestinal: Negative.    Endocrine: Negative.    Genitourinary: Negative.    Integumentary:  Negative.   Allergic/Immunologic: Negative.    Neurological: Negative.    Psychiatric/Behavioral: Negative.     All other systems reviewed and are negative.          Objective:      Vitals:    05/13/24 1029   BP: 123/70   Pulse: 74   Resp: 20   Temp: 98 °F (36.7 °C)      Body mass index is 21.02 kg/m².     Physical Exam  Vitals reviewed.   Constitutional:       Appearance: Normal appearance.   HENT:      Head: Normocephalic.      Mouth/Throat:      Mouth: Mucous membranes are moist.   Eyes:      Conjunctiva/sclera: Conjunctivae  normal.      Pupils: Pupils are equal, round, and reactive to light.   Cardiovascular:      Rate and Rhythm: Normal rate and regular rhythm.   Pulmonary:      Effort: Pulmonary effort is normal. No respiratory distress.      Breath sounds: Normal breath sounds.   Musculoskeletal:         General: Normal range of motion.      Cervical back: Normal range of motion and neck supple.   Skin:     General: Skin is warm and dry.   Neurological:      Mental Status: She is alert and oriented to person, place, and time.   Psychiatric:         Mood and Affect: Mood normal.            Current Outpatient Medications:     albuterol (PROVENTIL) 2.5 mg /3 mL (0.083 %) nebulizer solution, USE 1 VIAL IN NEBULIZER 4 TIMES DAILY AS NEEDED FOR WHEEZING *RESCUE, Disp: 150 mL, Rfl: 5    albuterol (PROVENTIL/VENTOLIN HFA) 90 mcg/actuation inhaler, Inhale 2 puffs into the lungs every 6 (six) hours as needed for Wheezing. Rescue, Disp: 18 g, Rfl: 11    budesonide-glycopyr-formoterol (BREZTRI AEROSPHERE) 160-9-4.8 mcg/actuation HFAA, Inhale 2 puffs into the lungs 2 (two) times a day., Disp: 10.7 g, Rfl: 11    busPIRone (BUSPAR) 5 MG Tab, Take 1 tablet (5 mg total) by mouth 3 (three) times daily as needed (anxiety)., Disp: 90 tablet, Rfl: 4    calcium carbonate (OS-NASRIN) 600 mg calcium (1,500 mg) Tab, Take 1,200 mg by mouth once., Disp: , Rfl:     cyanocobalamin 1,000 mcg/mL injection, Inject 1,000 mcg into the muscle every 7 days., Disp: , Rfl:     ergocalciferol (ERGOCALCIFEROL) 50,000 unit Cap, Take 50,000 Units by mouth every 7 days., Disp: , Rfl:     HUMIRA,CF, PEN 40 mg/0.4 mL PnKt, SMARTSI Milligram(s) SUB-Q Every 2 Weeks, Disp: , Rfl:     hypromellose (SYSTANE GEL OPHT), Apply 1 drop to eye as needed (dry eyes)., Disp: , Rfl:     lisinopriL-hydrochlorothiazide (PRINZIDE,ZESTORETIC) 20-25 mg Tab, Take 1 tablet by mouth once daily., Disp: 90 tablet, Rfl: 3    nebulizer accessories (NEBULIZER MISC), 1 each by Misc.(Non-Drug; Combo  Route) route daily as needed. RAJNI: 99 Dispense with preferred suppies, Disp: , Rfl:     nebulizers Misc, 1 each by Misc.(Non-Drug; Combo Route) route daily as needed (PRN COPD). RAJNI: 99 Dispense with preferred supplies, Disp: 1 each, Rfl: 0    OXcarbazepine (TRILEPTAL) 600 MG Tab, Take 2 tablets (1,200 mg total) by mouth every evening., Disp: 180 tablet, Rfl: 4    zoledronic acid-mannitol & water (RECLAST) 5 mg/100 mL PgBk, Inject 5 mg into the vein once. IVPB once yearly, Disp: , Rfl:     Assessment & Plan:     Problem List Items Addressed This Visit          Cardiac/Vascular    Hypertension - Primary (Chronic)     BP at goal  Medication management: Continue current medication (Lisinopril-HCTZ 20-25 mg po daily) as prescribed  She will purchase new BP cuff  Daily BP check; bring log all clinic visits  Instructed to report to ED for any BP greater than 200/100, dizziness, syncope, CP, or SOB  Keep appt. With PCP for follow up  Patient is agreeable to plan and verbalizes understanding

## 2024-07-16 DIAGNOSIS — J41.0 SIMPLE CHRONIC BRONCHITIS: Chronic | ICD-10-CM

## 2024-07-16 RX ORDER — BUDESONIDE, GLYCOPYRROLATE, AND FORMOTEROL FUMARATE 160; 9; 4.8 UG/1; UG/1; UG/1
2 AEROSOL, METERED RESPIRATORY (INHALATION) 2 TIMES DAILY
Qty: 11 G | Refills: 0 | Status: SHIPPED | OUTPATIENT
Start: 2024-07-16

## 2024-08-09 DIAGNOSIS — J41.0 SIMPLE CHRONIC BRONCHITIS: Chronic | ICD-10-CM

## 2024-08-12 RX ORDER — BUDESONIDE, GLYCOPYRROLATE, AND FORMOTEROL FUMARATE 160; 9; 4.8 UG/1; UG/1; UG/1
2 AEROSOL, METERED RESPIRATORY (INHALATION) 2 TIMES DAILY
Qty: 11 G | Refills: 0 | Status: SHIPPED | OUTPATIENT
Start: 2024-08-12 | End: 2024-08-13

## 2024-08-13 DIAGNOSIS — J41.0 SIMPLE CHRONIC BRONCHITIS: Chronic | ICD-10-CM

## 2024-08-13 RX ORDER — BUDESONIDE, GLYCOPYRROLATE, AND FORMOTEROL FUMARATE 160; 9; 4.8 UG/1; UG/1; UG/1
2 AEROSOL, METERED RESPIRATORY (INHALATION) 2 TIMES DAILY
Qty: 11 G | Refills: 0 | Status: SHIPPED | OUTPATIENT
Start: 2024-08-13

## 2024-08-19 ENCOUNTER — TELEPHONE (OUTPATIENT)
Dept: FAMILY MEDICINE | Facility: CLINIC | Age: 64
End: 2024-08-19
Payer: COMMERCIAL

## 2024-08-19 DIAGNOSIS — K50.919 CROHN'S DISEASE WITH COMPLICATION, UNSPECIFIED GASTROINTESTINAL TRACT LOCATION: ICD-10-CM

## 2024-08-19 DIAGNOSIS — Z00.00 WELLNESS EXAMINATION: ICD-10-CM

## 2024-08-19 DIAGNOSIS — E55.9 VITAMIN D DEFICIENCY: ICD-10-CM

## 2024-08-19 DIAGNOSIS — Z78.0 POSTMENOPAUSAL: ICD-10-CM

## 2024-08-19 DIAGNOSIS — E53.9 VITAMIN B DEFICIENCY, UNSPECIFIED: ICD-10-CM

## 2024-08-19 DIAGNOSIS — F41.1 GENERALIZED ANXIETY DISORDER: ICD-10-CM

## 2024-08-19 DIAGNOSIS — M81.0 OSTEOPOROSIS, UNSPECIFIED OSTEOPOROSIS TYPE, UNSPECIFIED PATHOLOGICAL FRACTURE PRESENCE: ICD-10-CM

## 2024-08-19 DIAGNOSIS — I10 PRIMARY HYPERTENSION: Primary | ICD-10-CM

## 2024-08-19 DIAGNOSIS — Z11.59 NEED FOR HEPATITIS C SCREENING TEST: ICD-10-CM

## 2024-08-20 ENCOUNTER — LAB VISIT (OUTPATIENT)
Dept: LAB | Facility: HOSPITAL | Age: 64
End: 2024-08-20
Attending: FAMILY MEDICINE
Payer: COMMERCIAL

## 2024-08-20 DIAGNOSIS — K50.919 CROHN'S DISEASE WITH COMPLICATION, UNSPECIFIED GASTROINTESTINAL TRACT LOCATION: ICD-10-CM

## 2024-08-20 DIAGNOSIS — F41.1 GENERALIZED ANXIETY DISORDER: ICD-10-CM

## 2024-08-20 DIAGNOSIS — M81.0 OSTEOPOROSIS, UNSPECIFIED OSTEOPOROSIS TYPE, UNSPECIFIED PATHOLOGICAL FRACTURE PRESENCE: ICD-10-CM

## 2024-08-20 DIAGNOSIS — Z78.0 POSTMENOPAUSAL: ICD-10-CM

## 2024-08-20 DIAGNOSIS — Z11.59 NEED FOR HEPATITIS C SCREENING TEST: ICD-10-CM

## 2024-08-20 DIAGNOSIS — Z00.00 WELLNESS EXAMINATION: ICD-10-CM

## 2024-08-20 DIAGNOSIS — I10 PRIMARY HYPERTENSION: ICD-10-CM

## 2024-08-20 DIAGNOSIS — E53.9 VITAMIN B DEFICIENCY, UNSPECIFIED: ICD-10-CM

## 2024-08-20 DIAGNOSIS — E55.9 VITAMIN D DEFICIENCY: ICD-10-CM

## 2024-08-20 LAB
25(OH)D3+25(OH)D2 SERPL-MCNC: 62 NG/ML (ref 30–80)
ALBUMIN SERPL-MCNC: 3.6 G/DL (ref 3.4–4.8)
ALBUMIN/GLOB SERPL: 1.2 RATIO (ref 1.1–2)
ALP SERPL-CCNC: 126 UNIT/L (ref 40–150)
ALT SERPL-CCNC: 12 UNIT/L (ref 0–55)
ANION GAP SERPL CALC-SCNC: 11 MEQ/L
AST SERPL-CCNC: 14 UNIT/L (ref 5–34)
BASOPHILS # BLD AUTO: 0.09 X10(3)/MCL
BASOPHILS NFR BLD AUTO: 0.8 %
BILIRUB SERPL-MCNC: 0.2 MG/DL
BILIRUB UR QL STRIP.AUTO: NEGATIVE
BUN SERPL-MCNC: 26.8 MG/DL (ref 9.8–20.1)
CALCIUM SERPL-MCNC: 9.7 MG/DL (ref 8.4–10.2)
CHLORIDE SERPL-SCNC: 102 MMOL/L (ref 98–107)
CHOLEST SERPL-MCNC: 196 MG/DL
CHOLEST/HDLC SERPL: 3 {RATIO} (ref 0–5)
CLARITY UR: CLEAR
CO2 SERPL-SCNC: 27 MMOL/L (ref 23–31)
COLOR UR AUTO: NORMAL
CREAT SERPL-MCNC: 0.94 MG/DL (ref 0.55–1.02)
CREAT/UREA NIT SERPL: 29
EOSINOPHIL # BLD AUTO: 0.33 X10(3)/MCL (ref 0–0.9)
EOSINOPHIL NFR BLD AUTO: 3 %
ERYTHROCYTE [DISTWIDTH] IN BLOOD BY AUTOMATED COUNT: 13.3 % (ref 11.5–17)
GFR SERPLBLD CREATININE-BSD FMLA CKD-EPI: >60 ML/MIN/1.73/M2
GLOBULIN SER-MCNC: 2.9 GM/DL (ref 2.4–3.5)
GLUCOSE SERPL-MCNC: 103 MG/DL (ref 82–115)
GLUCOSE UR QL STRIP: NEGATIVE
HCT VFR BLD AUTO: 45.1 % (ref 37–47)
HCV AB SERPL QL IA: NONREACTIVE
HDLC SERPL-MCNC: 72 MG/DL (ref 35–60)
HGB BLD-MCNC: 15.2 G/DL (ref 12–16)
HGB UR QL STRIP: NEGATIVE
IMM GRANULOCYTES # BLD AUTO: 0.04 X10(3)/MCL (ref 0–0.04)
IMM GRANULOCYTES NFR BLD AUTO: 0.4 %
KETONES UR QL STRIP: NEGATIVE
LDLC SERPL CALC-MCNC: 114 MG/DL (ref 50–140)
LEUKOCYTE ESTERASE UR QL STRIP: NEGATIVE
LYMPHOCYTES # BLD AUTO: 1.69 X10(3)/MCL (ref 0.6–4.6)
LYMPHOCYTES NFR BLD AUTO: 15.6 %
MCH RBC QN AUTO: 32.5 PG (ref 27–31)
MCHC RBC AUTO-ENTMCNC: 33.7 G/DL (ref 33–36)
MCV RBC AUTO: 96.4 FL (ref 80–94)
MONOCYTES # BLD AUTO: 0.67 X10(3)/MCL (ref 0.1–1.3)
MONOCYTES NFR BLD AUTO: 6.2 %
NEUTROPHILS # BLD AUTO: 8.02 X10(3)/MCL (ref 2.1–9.2)
NEUTROPHILS NFR BLD AUTO: 74 %
NITRITE UR QL STRIP: NEGATIVE
NRBC BLD AUTO-RTO: 0 %
PH UR STRIP: 5.5 [PH]
PLATELET # BLD AUTO: 457 X10(3)/MCL (ref 130–400)
PMV BLD AUTO: 9.9 FL (ref 7.4–10.4)
POTASSIUM SERPL-SCNC: 3.8 MMOL/L (ref 3.5–5.1)
PROT SERPL-MCNC: 6.5 GM/DL (ref 5.8–7.6)
PROT UR QL STRIP: NEGATIVE
RBC # BLD AUTO: 4.68 X10(6)/MCL (ref 4.2–5.4)
SODIUM SERPL-SCNC: 140 MMOL/L (ref 136–145)
SP GR UR STRIP.AUTO: >=1.03 (ref 1–1.03)
TRIGL SERPL-MCNC: 49 MG/DL (ref 37–140)
TSH SERPL-ACNC: 0.92 UIU/ML (ref 0.35–4.94)
UROBILINOGEN UR STRIP-ACNC: 0.2
VLDLC SERPL CALC-MCNC: 10 MG/DL
WBC # BLD AUTO: 10.84 X10(3)/MCL (ref 4.5–11.5)

## 2024-08-20 PROCEDURE — 80053 COMPREHEN METABOLIC PANEL: CPT

## 2024-08-20 PROCEDURE — 81003 URINALYSIS AUTO W/O SCOPE: CPT

## 2024-08-20 PROCEDURE — 86803 HEPATITIS C AB TEST: CPT

## 2024-08-20 PROCEDURE — 80061 LIPID PANEL: CPT

## 2024-08-20 PROCEDURE — 84443 ASSAY THYROID STIM HORMONE: CPT

## 2024-08-20 PROCEDURE — 36415 COLL VENOUS BLD VENIPUNCTURE: CPT

## 2024-08-20 PROCEDURE — 85025 COMPLETE CBC W/AUTO DIFF WBC: CPT

## 2024-08-20 PROCEDURE — 82306 VITAMIN D 25 HYDROXY: CPT

## 2024-08-24 DIAGNOSIS — I10 PRIMARY HYPERTENSION: Chronic | ICD-10-CM

## 2024-08-26 RX ORDER — LISINOPRIL AND HYDROCHLOROTHIAZIDE 20; 25 MG/1; MG/1
1 TABLET ORAL DAILY
Qty: 90 TABLET | Refills: 0 | Status: SHIPPED | OUTPATIENT
Start: 2024-08-26 | End: 2025-08-26

## 2024-08-28 ENCOUNTER — OFFICE VISIT (OUTPATIENT)
Dept: FAMILY MEDICINE | Facility: CLINIC | Age: 64
End: 2024-08-28
Payer: COMMERCIAL

## 2024-08-28 VITALS
TEMPERATURE: 98 F | HEART RATE: 84 BPM | RESPIRATION RATE: 18 BRPM | BODY MASS INDEX: 20.3 KG/M2 | SYSTOLIC BLOOD PRESSURE: 114 MMHG | OXYGEN SATURATION: 94 % | DIASTOLIC BLOOD PRESSURE: 62 MMHG | HEIGHT: 62 IN | WEIGHT: 110.31 LBS

## 2024-08-28 DIAGNOSIS — E55.9 VITAMIN D DEFICIENCY: ICD-10-CM

## 2024-08-28 DIAGNOSIS — I10 PRIMARY HYPERTENSION: Chronic | ICD-10-CM

## 2024-08-28 DIAGNOSIS — G40.909 SEIZURE DISORDER: ICD-10-CM

## 2024-08-28 DIAGNOSIS — E53.9 VITAMIN B DEFICIENCY, UNSPECIFIED: ICD-10-CM

## 2024-08-28 DIAGNOSIS — J41.0 SIMPLE CHRONIC BRONCHITIS: Chronic | ICD-10-CM

## 2024-08-28 DIAGNOSIS — Z00.00 WELLNESS EXAMINATION: Primary | ICD-10-CM

## 2024-08-28 DIAGNOSIS — Z78.0 POSTMENOPAUSAL: ICD-10-CM

## 2024-08-28 DIAGNOSIS — Z23 NEED FOR VACCINATION AGAINST STREPTOCOCCUS PNEUMONIAE: ICD-10-CM

## 2024-08-28 DIAGNOSIS — Z72.0 TOBACCO USER: Chronic | ICD-10-CM

## 2024-08-28 DIAGNOSIS — F41.1 GENERALIZED ANXIETY DISORDER: Chronic | ICD-10-CM

## 2024-08-28 DIAGNOSIS — M81.0 AGE RELATED OSTEOPOROSIS, UNSPECIFIED PATHOLOGICAL FRACTURE PRESENCE: ICD-10-CM

## 2024-08-28 DIAGNOSIS — K50.919 CROHN'S DISEASE WITH COMPLICATION, UNSPECIFIED GASTROINTESTINAL TRACT LOCATION: ICD-10-CM

## 2024-08-28 DIAGNOSIS — Z12.31 BREAST CANCER SCREENING BY MAMMOGRAM: ICD-10-CM

## 2024-08-28 DIAGNOSIS — D75.839 THROMBOCYTOSIS: ICD-10-CM

## 2024-08-28 PROCEDURE — 90471 IMMUNIZATION ADMIN: CPT | Mod: ,,, | Performed by: NURSE PRACTITIONER

## 2024-08-28 PROCEDURE — 90677 PCV20 VACCINE IM: CPT | Mod: ,,, | Performed by: NURSE PRACTITIONER

## 2024-08-28 PROCEDURE — 99397 PER PM REEVAL EST PAT 65+ YR: CPT | Mod: 25,,, | Performed by: NURSE PRACTITIONER

## 2024-08-28 PROCEDURE — 4010F ACE/ARB THERAPY RXD/TAKEN: CPT | Mod: CPTII,,, | Performed by: NURSE PRACTITIONER

## 2024-08-28 PROCEDURE — 1159F MED LIST DOCD IN RCRD: CPT | Mod: CPTII,,, | Performed by: NURSE PRACTITIONER

## 2024-08-28 PROCEDURE — 3078F DIAST BP <80 MM HG: CPT | Mod: CPTII,,, | Performed by: NURSE PRACTITIONER

## 2024-08-28 PROCEDURE — 3288F FALL RISK ASSESSMENT DOCD: CPT | Mod: CPTII,,, | Performed by: NURSE PRACTITIONER

## 2024-08-28 PROCEDURE — 1160F RVW MEDS BY RX/DR IN RCRD: CPT | Mod: CPTII,,, | Performed by: NURSE PRACTITIONER

## 2024-08-28 PROCEDURE — 1126F AMNT PAIN NOTED NONE PRSNT: CPT | Mod: CPTII,,, | Performed by: NURSE PRACTITIONER

## 2024-08-28 PROCEDURE — 3074F SYST BP LT 130 MM HG: CPT | Mod: CPTII,,, | Performed by: NURSE PRACTITIONER

## 2024-08-28 PROCEDURE — 1101F PT FALLS ASSESS-DOCD LE1/YR: CPT | Mod: CPTII,,, | Performed by: NURSE PRACTITIONER

## 2024-08-28 PROCEDURE — 3008F BODY MASS INDEX DOCD: CPT | Mod: CPTII,,, | Performed by: NURSE PRACTITIONER

## 2024-08-28 RX ORDER — LIDOCAINE 50 MG/G
1 PATCH TOPICAL
COMMUNITY
Start: 2024-08-05 | End: 2024-09-04

## 2024-08-28 RX ORDER — HYDROCODONE BITARTRATE AND ACETAMINOPHEN 5; 325 MG/1; MG/1
1 TABLET ORAL EVERY 6 HOURS PRN
COMMUNITY
Start: 2024-07-10

## 2024-08-28 NOTE — PROGRESS NOTES
Subjective:      Patient ID: Judy Vides is a 64 y.o. White female      Chief Complaint: Annual Exam (Wellness) and Lab Results       Past Medical History:   Diagnosis Date    Chronic obstructive pulmonary disease 08/17/2022    Crohn disease     Epilepsy     Generalized anxiety disorder 08/17/2022    Headache 08/17/2022    Hemorrhoids, external 02/23/2023    Hypertension     Neck pain 08/17/2022    Osteoporosis     Seizure disorder 08/17/2022    Tobacco user 08/17/2022    Vitamin B deficiency, unspecified 02/23/2023        HPI  Presents to clinic for Annual Wellness exam.  Denies any acute problems.    Labs due and ordered  MMG due and ordered  DEXA due:  States will be ordered per Dr. Landers, Endocrinology; Hx Osteoporosis  Pneumovax due: given in office today  Covid-19 vaccine due: will complete at local pharmacy  Tdap, Shingles, and RSV due: will complete in the future            Patient Care Team:  Anita Mcqueen MD as PCP - General (Family Medicine)  Angie Falk NP as Nurse Practitioner (Neurology)  Wanda Cedeño ANP as Nurse Practitioner (Internal Medicine)  Marcello Xiao MD as Consulting Physician (Endocrinology)      Review of Systems   Constitutional: Negative.  Negative for appetite change, chills and fever.   HENT: Negative.     Eyes: Negative.  Negative for discharge and redness.   Respiratory: Negative.  Negative for shortness of breath.    Cardiovascular: Negative.  Negative for chest pain.   Gastrointestinal: Negative.    Endocrine: Negative.    Genitourinary: Negative.    Integumentary:  Negative.   Allergic/Immunologic: Negative.    Neurological: Negative.    Psychiatric/Behavioral: Negative.     All other systems reviewed and are negative.          Objective:      Vitals:    08/28/24 1356   BP: 114/62   Pulse: 84   Resp: 18   Temp: 98 °F (36.7 °C)      Body mass index is 20.17 kg/m².     Physical Exam  Vitals reviewed.   Constitutional:       Appearance: Normal appearance.   HENT:       Head: Normocephalic.      Mouth/Throat:      Mouth: Mucous membranes are moist.   Eyes:      Conjunctiva/sclera: Conjunctivae normal.      Pupils: Pupils are equal, round, and reactive to light.   Cardiovascular:      Rate and Rhythm: Normal rate and regular rhythm.   Pulmonary:      Effort: Pulmonary effort is normal. No respiratory distress.      Breath sounds: Normal breath sounds.   Abdominal:      General: Bowel sounds are normal. There is no distension.      Palpations: Abdomen is soft.   Musculoskeletal:         General: Normal range of motion.      Cervical back: Normal range of motion and neck supple.   Skin:     General: Skin is warm and dry.   Neurological:      Mental Status: She is alert and oriented to person, place, and time.   Psychiatric:         Mood and Affect: Mood normal.            Current Outpatient Medications:     albuterol (PROVENTIL) 2.5 mg /3 mL (0.083 %) nebulizer solution, USE 1 VIAL IN NEBULIZER 4 TIMES DAILY AS NEEDED FOR WHEEZING *RESCUE, Disp: 150 mL, Rfl: 5    albuterol (PROVENTIL/VENTOLIN HFA) 90 mcg/actuation inhaler, Inhale 2 puffs into the lungs every 6 (six) hours as needed for Wheezing. Rescue, Disp: 18 g, Rfl: 11    BREZTRI AEROSPHERE 160-9-4.8 mcg/actuation HFAA, Inhale 2 puffs by mouth twice daily, Disp: 11 g, Rfl: 0    busPIRone (BUSPAR) 5 MG Tab, Take 1 tablet (5 mg total) by mouth 3 (three) times daily as needed (anxiety)., Disp: 90 tablet, Rfl: 4    calcium carbonate (OS-NASRIN) 600 mg calcium (1,500 mg) Tab, Take 1,200 mg by mouth once., Disp: , Rfl:     cyanocobalamin 1,000 mcg/mL injection, Inject 1,000 mcg into the muscle every 7 days., Disp: , Rfl:     ergocalciferol (ERGOCALCIFEROL) 50,000 unit Cap, Take 50,000 Units by mouth every 7 days., Disp: , Rfl:     HUMIRA,CF, PEN 40 mg/0.4 mL PnKt, SMARTSI Milligram(s) SUB-Q Every 2 Weeks, Disp: , Rfl:     hypromellose (SYSTANE GEL OPHT), Apply 1 drop to eye as needed (dry eyes)., Disp: , Rfl:     LIDOcaine  (LIDODERM) 5 %, Place 1 patch onto the skin., Disp: , Rfl:     lisinopriL-hydrochlorothiazide (PRINZIDE,ZESTORETIC) 20-25 mg Tab, Take 1 tablet by mouth once daily, Disp: 90 tablet, Rfl: 0    nebulizer accessories (NEBULIZER MISC), 1 each by Misc.(Non-Drug; Combo Route) route daily as needed. RAJNI: 99 Dispense with preferred suppies, Disp: , Rfl:     nebulizers Misc, 1 each by Misc.(Non-Drug; Combo Route) route daily as needed (PRN COPD). RAJNI: 99 Dispense with preferred supplies, Disp: 1 each, Rfl: 0    OXcarbazepine (TRILEPTAL) 600 MG Tab, Take 2 tablets (1,200 mg total) by mouth every evening., Disp: 180 tablet, Rfl: 4    zoledronic acid-mannitol & water (RECLAST) 5 mg/100 mL PgBk, Inject 5 mg into the vein once. IVPB once yearly, Disp: , Rfl:     HYDROcodone-acetaminophen (NORCO) 5-325 mg per tablet, Take 1 tablet by mouth every 6 (six) hours as needed. (Patient not taking: Reported on 8/28/2024), Disp: , Rfl:   No current facility-administered medications for this visit.    Assessment & Plan:     Problem List Items Addressed This Visit          Neuro    Seizure disorder     Stable  Continue current meds (On oxcarbazepine)  Keep appt with Neurology for follow up              Psychiatric    Generalized anxiety disorder (Chronic)     Stable   Continue buspirone as prescribed   Keep appointment with PCP for follow-up            Pulmonary    Chronic obstructive pulmonary disease (Chronic)     Stable   Continue inhalers   Declines PFTs   CT lung cancer screening up-to-date            Cardiac/Vascular    Hypertension (Chronic)     BP at goal  Medication management: Continue current medication (Lisinopril-HCTZ 20-25 mg po daily) as prescribed  She will purchase new BP cuff  Daily BP check; bring log all clinic visits  Instructed to report to ED for any BP greater than 200/100, dizziness, syncope, CP, or SOB  Keep appt. With PCP for follow up  Patient is agreeable to plan and verbalizes understandin            Renal/     Postmenopausal     Stable   Continue Reclast as prescribed  Keep appointment with Dr. Lucio, endocrinology, for follow-up  DEXA scan is due         Breast cancer screening by mammogram    Relevant Orders    Mammo Digital Screening Bilat w/ Chris       Oncology    Thrombocytosis     Chronic; Asymptomatic; Pt is a smoker;  She also has history of Crohn's and is on Humira  Patient concerned due to elevated platelet count; therefore, will repeat labs with further evaluation in 1 month (including peripheral smear)  If appointment with PCP for follow-up         Relevant Orders    CBC Auto Differential    Iron and TIBC    Ferritin    Path Review, Peripheral Smear       Endocrine    Osteoporosis     Stable   Continue Reclast as prescribed  Keep appointment with Dr. Lucio, endocrinology, for follow-up  DEXA scan is due         Vitamin D deficiency     Vitamin-D levels at goal   Continue vitamin-D supplements  Keep appointment with PCP for follow-up         Vitamin B deficiency, unspecified     Continue IM B12 injections weekly per GI   Keep appointment with GI for follow-up            GI    Crohn disease     Stable on Humira   Keep appointment with GI for follow-up            Other    Tobacco user (Chronic)     Educated on smoking cessation   Not ready to quit  CT lung cancer screening up-to-date         Wellness examination - Primary     Labs due and ordered  MMG due and ordered  DEXA due:  States will be ordered per Dr. Landers, Endocrinology; Hx Osteoporosis  Pneumovax due: given in office today  Covid-19 vaccine due: will complete at local pharmacy  Tdap, Shingles, and RSV due: will complete in the future             Other Visit Diagnoses       Need for vaccination against Streptococcus pneumoniae        Relevant Medications    (VFC) PCV20 (Prevnar 20) IM vaccine (>/= 6 wks) (Completed)

## 2024-08-28 NOTE — ASSESSMENT & PLAN NOTE
Stable   Continue Reclast as prescribed  Keep appointment with Dr. Lucio, endocrinology, for follow-up  DEXA scan is due

## 2024-08-28 NOTE — ASSESSMENT & PLAN NOTE
Labs due and ordered  MMG due and ordered  DEXA due:  States will be ordered per Dr. Landers, Endocrinology; Hx Osteoporosis  Pneumovax due: given in office today  Covid-19 vaccine due: will complete at local pharmacy  Tdap, Shingles, and RSV due: will complete in the future

## 2024-08-28 NOTE — ASSESSMENT & PLAN NOTE
Chronic; Asymptomatic; Pt is a smoker;  She also has history of Crohn's and is on Humira  Patient concerned due to elevated platelet count; therefore, will repeat labs with further evaluation in 1 month (including peripheral smear)  If appointment with PCP for follow-up

## 2024-08-28 NOTE — ASSESSMENT & PLAN NOTE
Vitamin-D levels at goal   Continue vitamin-D supplements  Keep appointment with PCP for follow-up

## 2024-08-28 NOTE — ASSESSMENT & PLAN NOTE
BP at goal  Medication management: Continue current medication (Lisinopril-HCTZ 20-25 mg po daily) as prescribed  She will purchase new BP cuff  Daily BP check; bring log all clinic visits  Instructed to report to ED for any BP greater than 200/100, dizziness, syncope, CP, or SOB  Keep appt. With PCP for follow up  Patient is agreeable to plan and verbalizes understandin

## 2024-09-07 DIAGNOSIS — J41.0 SIMPLE CHRONIC BRONCHITIS: Chronic | ICD-10-CM

## 2024-09-09 RX ORDER — BUDESONIDE, GLYCOPYRROLATE, AND FORMOTEROL FUMARATE 160; 9; 4.8 UG/1; UG/1; UG/1
2 AEROSOL, METERED RESPIRATORY (INHALATION) 2 TIMES DAILY
Qty: 11 G | Refills: 0 | Status: SHIPPED | OUTPATIENT
Start: 2024-09-09

## 2024-09-12 ENCOUNTER — TELEPHONE (OUTPATIENT)
Dept: FAMILY MEDICINE | Facility: CLINIC | Age: 64
End: 2024-09-12
Payer: COMMERCIAL

## 2024-09-16 ENCOUNTER — LAB VISIT (OUTPATIENT)
Dept: LAB | Facility: HOSPITAL | Age: 64
End: 2024-09-16
Attending: NURSE PRACTITIONER
Payer: COMMERCIAL

## 2024-09-16 ENCOUNTER — HOSPITAL ENCOUNTER (OUTPATIENT)
Dept: RADIOLOGY | Facility: HOSPITAL | Age: 64
Discharge: HOME | End: 2024-09-16
Attending: NURSE PRACTITIONER
Payer: COMMERCIAL

## 2024-09-16 DIAGNOSIS — Z12.31 BREAST CANCER SCREENING BY MAMMOGRAM: ICD-10-CM

## 2024-09-16 DIAGNOSIS — D75.839 THROMBOCYTOSIS: ICD-10-CM

## 2024-09-16 LAB
BASOPHILS # BLD AUTO: 0.09 X10(3)/MCL
BASOPHILS NFR BLD AUTO: 0.9 %
EOSINOPHIL # BLD AUTO: 0.22 X10(3)/MCL (ref 0–0.9)
EOSINOPHIL NFR BLD AUTO: 2.3 %
ERYTHROCYTE [DISTWIDTH] IN BLOOD BY AUTOMATED COUNT: 13.6 % (ref 11.5–17)
FERRITIN SERPL-MCNC: 124.91 NG/ML (ref 4.63–204)
HCT VFR BLD AUTO: 46.6 % (ref 37–47)
HGB BLD-MCNC: 15.7 G/DL (ref 12–16)
IMM GRANULOCYTES # BLD AUTO: 0.05 X10(3)/MCL (ref 0–0.04)
IMM GRANULOCYTES NFR BLD AUTO: 0.5 %
IRON SATN MFR SERPL: 42 % (ref 20–50)
IRON SERPL-MCNC: 108 UG/DL (ref 50–170)
LYMPHOCYTES # BLD AUTO: 1.43 X10(3)/MCL (ref 0.6–4.6)
LYMPHOCYTES NFR BLD AUTO: 14.8 %
MCH RBC QN AUTO: 31.8 PG (ref 27–31)
MCHC RBC AUTO-ENTMCNC: 33.7 G/DL (ref 33–36)
MCV RBC AUTO: 94.3 FL (ref 80–94)
MONOCYTES # BLD AUTO: 0.76 X10(3)/MCL (ref 0.1–1.3)
MONOCYTES NFR BLD AUTO: 7.9 %
NEUTROPHILS # BLD AUTO: 7.11 X10(3)/MCL (ref 2.1–9.2)
NEUTROPHILS NFR BLD AUTO: 73.6 %
NRBC BLD AUTO-RTO: 0 %
PLATELET # BLD AUTO: 465 X10(3)/MCL (ref 130–400)
PMV BLD AUTO: 10.1 FL (ref 7.4–10.4)
RBC # BLD AUTO: 4.94 X10(6)/MCL (ref 4.2–5.4)
TIBC SERPL-MCNC: 149 UG/DL (ref 70–310)
TIBC SERPL-MCNC: 257 UG/DL (ref 250–450)
TRANSFERRIN SERPL-MCNC: 229 MG/DL (ref 173–360)
WBC # BLD AUTO: 9.66 X10(3)/MCL (ref 4.5–11.5)

## 2024-09-16 PROCEDURE — 85025 COMPLETE CBC W/AUTO DIFF WBC: CPT

## 2024-09-16 PROCEDURE — 83540 ASSAY OF IRON: CPT

## 2024-09-16 PROCEDURE — 82728 ASSAY OF FERRITIN: CPT

## 2024-09-16 PROCEDURE — 77063 BREAST TOMOSYNTHESIS BI: CPT | Mod: 26,,, | Performed by: RADIOLOGY

## 2024-09-16 PROCEDURE — 83550 IRON BINDING TEST: CPT

## 2024-09-16 PROCEDURE — 77067 SCR MAMMO BI INCL CAD: CPT | Mod: TC

## 2024-09-16 PROCEDURE — 77063 BREAST TOMOSYNTHESIS BI: CPT | Mod: TC

## 2024-09-16 PROCEDURE — 36415 COLL VENOUS BLD VENIPUNCTURE: CPT

## 2024-09-16 PROCEDURE — 77067 SCR MAMMO BI INCL CAD: CPT | Mod: 26,,, | Performed by: RADIOLOGY

## 2024-09-19 LAB — VIEW PATHOLOGY REPORT (RELIAPATH): NORMAL

## 2024-09-20 ENCOUNTER — LAB VISIT (OUTPATIENT)
Dept: LAB | Facility: HOSPITAL | Age: 64
End: 2024-09-20
Attending: NURSE PRACTITIONER
Payer: COMMERCIAL

## 2024-09-20 DIAGNOSIS — M81.0 SENILE OSTEOPOROSIS: ICD-10-CM

## 2024-09-20 DIAGNOSIS — J44.9 VANISHING LUNG: ICD-10-CM

## 2024-09-20 DIAGNOSIS — D75.839 THROMBOCYTOSIS: Primary | ICD-10-CM

## 2024-09-20 DIAGNOSIS — Z79.899 ENCOUNTER FOR LONG-TERM (CURRENT) USE OF OTHER MEDICATIONS: ICD-10-CM

## 2024-09-20 DIAGNOSIS — F17.200 TOBACCO USE DISORDER: ICD-10-CM

## 2024-09-20 DIAGNOSIS — K50.80 REGIONAL ENTERITIS OF SMALL INTESTINE WITH LARGE INTESTINE: Primary | ICD-10-CM

## 2024-09-20 LAB
ALBUMIN SERPL-MCNC: 4 G/DL (ref 3.4–4.8)
ALBUMIN/GLOB SERPL: 1.5 RATIO (ref 1.1–2)
ALP SERPL-CCNC: 98 UNIT/L (ref 40–150)
ALT SERPL-CCNC: 11 UNIT/L (ref 0–55)
ANION GAP SERPL CALC-SCNC: 10 MEQ/L
AST SERPL-CCNC: 15 UNIT/L (ref 5–34)
BASOPHILS # BLD AUTO: 0.08 X10(3)/MCL
BASOPHILS NFR BLD AUTO: 1 %
BILIRUB SERPL-MCNC: 0.3 MG/DL
BUN SERPL-MCNC: 37.7 MG/DL (ref 9.8–20.1)
CALCIUM SERPL-MCNC: 9.6 MG/DL (ref 8.4–10.2)
CHLORIDE SERPL-SCNC: 102 MMOL/L (ref 98–107)
CO2 SERPL-SCNC: 25 MMOL/L (ref 23–31)
CREAT SERPL-MCNC: 0.9 MG/DL (ref 0.55–1.02)
CREAT/UREA NIT SERPL: 42
CRP SERPL-MCNC: 8.3 MG/L
EOSINOPHIL # BLD AUTO: 0.21 X10(3)/MCL (ref 0–0.9)
EOSINOPHIL NFR BLD AUTO: 2.6 %
ERYTHROCYTE [DISTWIDTH] IN BLOOD BY AUTOMATED COUNT: 13.8 % (ref 11.5–17)
GFR SERPLBLD CREATININE-BSD FMLA CKD-EPI: >60 ML/MIN/1.73/M2
GLOBULIN SER-MCNC: 2.7 GM/DL (ref 2.4–3.5)
GLUCOSE SERPL-MCNC: 107 MG/DL (ref 82–115)
HCT VFR BLD AUTO: 46 % (ref 37–47)
HGB BLD-MCNC: 15.4 G/DL (ref 12–16)
IMM GRANULOCYTES # BLD AUTO: 0.05 X10(3)/MCL (ref 0–0.04)
IMM GRANULOCYTES NFR BLD AUTO: 0.6 %
LYMPHOCYTES # BLD AUTO: 1.38 X10(3)/MCL (ref 0.6–4.6)
LYMPHOCYTES NFR BLD AUTO: 16.8 %
MCH RBC QN AUTO: 31.8 PG (ref 27–31)
MCHC RBC AUTO-ENTMCNC: 33.5 G/DL (ref 33–36)
MCV RBC AUTO: 95 FL (ref 80–94)
MONOCYTES # BLD AUTO: 0.56 X10(3)/MCL (ref 0.1–1.3)
MONOCYTES NFR BLD AUTO: 6.8 %
NEUTROPHILS # BLD AUTO: 5.94 X10(3)/MCL (ref 2.1–9.2)
NEUTROPHILS NFR BLD AUTO: 72.2 %
NRBC BLD AUTO-RTO: 0 %
PLATELET # BLD AUTO: 481 X10(3)/MCL (ref 130–400)
PMV BLD AUTO: 10.2 FL (ref 7.4–10.4)
POTASSIUM SERPL-SCNC: 4.6 MMOL/L (ref 3.5–5.1)
PROT SERPL-MCNC: 6.7 GM/DL (ref 5.8–7.6)
RBC # BLD AUTO: 4.84 X10(6)/MCL (ref 4.2–5.4)
SODIUM SERPL-SCNC: 137 MMOL/L (ref 136–145)
WBC # BLD AUTO: 8.22 X10(3)/MCL (ref 4.5–11.5)

## 2024-09-20 PROCEDURE — 86140 C-REACTIVE PROTEIN: CPT

## 2024-09-20 PROCEDURE — 85025 COMPLETE CBC W/AUTO DIFF WBC: CPT

## 2024-09-20 PROCEDURE — 80053 COMPREHEN METABOLIC PANEL: CPT

## 2024-09-20 PROCEDURE — 36415 COLL VENOUS BLD VENIPUNCTURE: CPT

## 2024-09-27 ENCOUNTER — TELEPHONE (OUTPATIENT)
Dept: HEMATOLOGY/ONCOLOGY | Facility: CLINIC | Age: 64
End: 2024-09-27
Payer: COMMERCIAL

## 2024-09-27 NOTE — TELEPHONE ENCOUNTER
Hello,    [facility].    I called & left voice mails for pt on 9/25/24, 9/26/24 & 9/27/24.    I will be happy to make an appointment for this pt when she returns my call. [phone].    Jasmin Atwood [facility]

## 2024-10-03 DIAGNOSIS — J41.0 SIMPLE CHRONIC BRONCHITIS: Chronic | ICD-10-CM

## 2024-10-03 RX ORDER — BUDESONIDE, GLYCOPYRROLATE, AND FORMOTEROL FUMARATE 160; 9; 4.8 UG/1; UG/1; UG/1
2 AEROSOL, METERED RESPIRATORY (INHALATION) 2 TIMES DAILY
Qty: 11 G | Refills: 6 | Status: SHIPPED | OUTPATIENT
Start: 2024-10-03

## 2024-10-16 NOTE — PROGRESS NOTES
Subjective:        PATIENT: Judy Vides  MRN: [id number]  DATE: 10/21/2024    PCP: Anita Mcqueen MD   Referring Provider : Concepcion Byrne NP  [address] [address]     Chief Complaint: Est care; Thombocytosis      10/21/2024  64-year-old patient referred for thrombocytosis.  Last blood work on September 20, 2024 showed a platelet count of 481      The old CBC I can find on record from 2017 as a normal platelet count of 262.    The 1st rise in platelet count is August 12, 2020 where it is 421.    It reached a high of 505 July 15, 2022    In  patient's labs she was a normal white count, hematocrit of 46, MCV of 95 and a normal differential.  Her C-reactive protein has been elevated especially 3 weeks ago at 8.3.  And this patient has normal iron studies normal ferritins normal iron saturation      And peripheral smear review shows normochromic normocytic red cell population with mild thrombocytosis favor reactive with a normal white blood cell count with a normal differential with no blasts circulating lymphoma cells identified              She denies any itching, denies any tenderness of the hands and feet.  Denies any Raynaud's type symptoms      Past Medical History:   Diagnosis Date    Chronic obstructive pulmonary disease 08/17/2022    Crohn disease     Epilepsy     Generalized anxiety disorder 08/17/2022    Headache 08/17/2022    Hemorrhoids, external 02/23/2023    Hypertension     Neck pain 08/17/2022    Osteoporosis     Seizure disorder 08/17/2022    Tobacco user 08/17/2022    Vitamin B deficiency, unspecified 02/23/2023      .  Past Surgical History:   Procedure Laterality Date    APPENDECTOMY      CATARACT EXTRACTION      EYE SURGERY        .  Family History   Problem Relation Name Age of Onset    Heart disease Mother      Emphysema Father        Social History     Socioeconomic History    Marital status: Single    Number of children: 0   Occupational History     Occupation: [employer] Employee    Tobacco Use    Smoking status: Every Day     Current packs/day: 1.00     Average packs/day: 0.5 packs/day for 50.8 years (25.8 ttl pk-yrs)     Types: Cigarettes     Start date:     Smokeless tobacco: Never   Substance and Sexual Activity    Alcohol use: Not Currently    Drug use: Not Currently    Sexual activity: Not Currently      .Review of patient's allergies indicates:  No Known Allergies     Current Outpatient Medications:     albuterol (PROVENTIL) 2.5 mg /3 mL (0.083 %) nebulizer solution, USE 1 VIAL IN NEBULIZER 4 TIMES DAILY AS NEEDED FOR WHEEZING *RESCUE, Disp: 150 mL, Rfl: 5    albuterol (PROVENTIL/VENTOLIN HFA) 90 mcg/actuation inhaler, Inhale 2 puffs into the lungs every 6 (six) hours as needed for Wheezing. Rescue, Disp: 18 g, Rfl: 11    budesonide-glycopyr-formoterol (BREZTRI AEROSPHERE) 160-9-4.8 mcg/actuation HFAA, Inhale 2 puffs by mouth twice daily, Disp: 11 g, Rfl: 6    busPIRone (BUSPAR) 5 MG Tab, Take 1 tablet (5 mg total) by mouth 3 (three) times daily as needed (anxiety)., Disp: 90 tablet, Rfl: 4    calcium carbonate (OS-NASRIN) 600 mg calcium (1,500 mg) Tab, Take 1,200 mg by mouth once., Disp: , Rfl:     cyanocobalamin 1,000 mcg/mL injection, Inject 1,000 mcg into the muscle every 7 days., Disp: , Rfl:     ergocalciferol (ERGOCALCIFEROL) 50,000 unit Cap, Take 50,000 Units by mouth every 7 days., Disp: , Rfl:     HUMIRA,CF, PEN 40 mg/0.4 mL PnKt, SMARTSI Milligram(s) SUB-Q Every 2 Weeks, Disp: , Rfl:     hypromellose (SYSTANE GEL OPHT), Apply 1 drop to eye as needed (dry eyes)., Disp: , Rfl:     lisinopriL-hydrochlorothiazide (PRINZIDE,ZESTORETIC) 20-25 mg Tab, Take 1 tablet by mouth once daily, Disp: 90 tablet, Rfl: 0    nebulizer accessories (NEBULIZER MISC), 1 each by Misc.(Non-Drug; Combo Route) route daily as needed. RAJNI: 99 Dispense with preferred suppies, Disp: , Rfl:     nebulizers Misc, 1 each by Misc.(Non-Drug; Combo Route) route daily as needed (PRN  COPD). RAJNI: 99 Dispense with preferred supplies, Disp: 1 each, Rfl: 0    OXcarbazepine (TRILEPTAL) 600 MG Tab, Take 2 tablets (1,200 mg total) by mouth every evening., Disp: 180 tablet, Rfl: 4    zoledronic acid-mannitol & water (RECLAST) 5 mg/100 mL PgBk, Inject 5 mg into the vein once. IVPB once yearly, Disp: , Rfl:     HYDROcodone-acetaminophen (NORCO) 5-325 mg per tablet, Take 1 tablet by mouth every 6 (six) hours as needed. (Patient not taking: Reported on 10/21/2024), Disp: , Rfl:    Review of Systems   HENT:  Positive for congestion.    Respiratory:  Positive for cough and wheezing.    Cardiovascular: Negative.    Gastrointestinal:  Positive for abdominal distention. Negative for blood in stool, constipation and diarrhea.   Genitourinary:  Positive for frequency.   Musculoskeletal:  Positive for arthralgias.   Skin: Negative.    Neurological: Negative.    Hematological:  Bruises/bleeds easily.   Psychiatric/Behavioral: Negative.            Objective:     Vitals:    10/21/24 1109   BP: (!) 141/75   Pulse: 75   Resp: 18   Temp: 97.7 °F (36.5 °C)         Physical Exam  Vitals reviewed.   Constitutional:       General: She is awake.      Appearance: Normal appearance.   HENT:      Head: Normocephalic and atraumatic.      Mouth/Throat:      Mouth: Mucous membranes are moist.      Pharynx: Oropharynx is clear.   Eyes:      Extraocular Movements: Extraocular movements intact.      Conjunctiva/sclera: Conjunctivae normal.      Pupils: Pupils are equal, round, and reactive to light.   Cardiovascular:      Rate and Rhythm: Normal rate and regular rhythm.      Pulses: Normal pulses.      Heart sounds: Normal heart sounds.   Pulmonary:      Effort: Pulmonary effort is normal.      Breath sounds: Examination of the right-lower field reveals decreased breath sounds. Examination of the left-lower field reveals decreased breath sounds. Decreased breath sounds present.   Abdominal:      General: Abdomen is flat. Bowel sounds  are normal.      Palpations: Abdomen is soft.      Tenderness: There is no abdominal tenderness.   Musculoskeletal:      Cervical back: Normal range of motion.      Right lower leg: No edema.      Left lower leg: No edema.   Lymphadenopathy:      Cervical: No cervical adenopathy.      Upper Body:      Right upper body: No axillary adenopathy.      Left upper body: No axillary adenopathy.      Lower Body: No right inguinal adenopathy. No left inguinal adenopathy.   Skin:     General: Skin is warm and dry.   Neurological:      General: No focal deficit present.      Mental Status: She is alert and oriented to person, place, and time. Mental status is at baseline.   Psychiatric:         Attention and Perception: Attention normal.         Mood and Affect: Mood and affect normal.         Behavior: Behavior is cooperative.         ECOG SCORE    1 - Restricted in strenuous activity-ambulatory and able to carry out work of a light nature        .Lab Review:  Reviewed in EPIC---also see above in HPI  Assessment/Plan:   Reactive thrombocytosis       I do not think this is myeloproliferative.  This patient has no symptoms, her platelets has been elevated for several years.  And she has no leukocytosis or significant erythrocytosis.  Her peripheral smear is normal.  Given the inflammatory elevation of the CRP and patient was underlying disease this is reactive thrombocytosis.  This has been present for 4 years.  And has not significantly changed      Recommended she see Gastroenterology to reschedule her colonoscopy---whether inflammatory CRP and thrombocytosis this may be that they need to adjust her Crohn's medications more.  While she was no clinical symptoms she may continue to have colitis    Patient refused tobacco cessation   Explained importance of yearly mammography, yearly CT scan of the lung, colonoscopies to schedule screen for colon cancer especially given her inflammatory bowel disease   No need for further workup  at this point in time                    Follow up for discharge from clinic.         Loki Kc MD    Total time 45 minutes, 35 minutes was spent with the patient today, an additional 10 minutes was scheduled in documentation and review of old records

## 2024-10-21 ENCOUNTER — OFFICE VISIT (OUTPATIENT)
Dept: HEMATOLOGY/ONCOLOGY | Facility: CLINIC | Age: 64
End: 2024-10-21
Payer: COMMERCIAL

## 2024-10-21 VITALS
WEIGHT: 111.19 LBS | OXYGEN SATURATION: 93 % | BODY MASS INDEX: 20.46 KG/M2 | TEMPERATURE: 98 F | RESPIRATION RATE: 18 BRPM | HEIGHT: 62 IN | DIASTOLIC BLOOD PRESSURE: 75 MMHG | SYSTOLIC BLOOD PRESSURE: 141 MMHG | HEART RATE: 75 BPM

## 2024-10-21 DIAGNOSIS — K50.90 CROHN'S DISEASE WITHOUT COMPLICATION, UNSPECIFIED GASTROINTESTINAL TRACT LOCATION: ICD-10-CM

## 2024-10-21 DIAGNOSIS — D75.839 THROMBOCYTOSIS: Primary | ICD-10-CM

## 2024-10-21 PROCEDURE — 3078F DIAST BP <80 MM HG: CPT | Mod: CPTII,S$GLB,, | Performed by: INTERNAL MEDICINE

## 2024-10-21 PROCEDURE — 3008F BODY MASS INDEX DOCD: CPT | Mod: CPTII,S$GLB,, | Performed by: INTERNAL MEDICINE

## 2024-10-21 PROCEDURE — 1160F RVW MEDS BY RX/DR IN RCRD: CPT | Mod: CPTII,S$GLB,, | Performed by: INTERNAL MEDICINE

## 2024-10-21 PROCEDURE — 1126F AMNT PAIN NOTED NONE PRSNT: CPT | Mod: CPTII,S$GLB,, | Performed by: INTERNAL MEDICINE

## 2024-10-21 PROCEDURE — 99204 OFFICE O/P NEW MOD 45 MIN: CPT | Mod: S$GLB,,, | Performed by: INTERNAL MEDICINE

## 2024-10-21 PROCEDURE — 3077F SYST BP >= 140 MM HG: CPT | Mod: CPTII,S$GLB,, | Performed by: INTERNAL MEDICINE

## 2024-10-21 PROCEDURE — 1159F MED LIST DOCD IN RCRD: CPT | Mod: CPTII,S$GLB,, | Performed by: INTERNAL MEDICINE

## 2024-10-21 PROCEDURE — 4010F ACE/ARB THERAPY RXD/TAKEN: CPT | Mod: CPTII,S$GLB,, | Performed by: INTERNAL MEDICINE

## 2024-10-21 PROCEDURE — 99999 PR PBB SHADOW E&M-EST. PATIENT-LVL V: CPT | Mod: PBBFAC,,, | Performed by: INTERNAL MEDICINE

## 2024-11-06 ENCOUNTER — LAB VISIT (OUTPATIENT)
Dept: LAB | Facility: HOSPITAL | Age: 64
End: 2024-11-06
Attending: INTERNAL MEDICINE
Payer: COMMERCIAL

## 2024-11-06 DIAGNOSIS — Z79.899 ENCOUNTER FOR LONG-TERM (CURRENT) USE OF MEDICATIONS: Primary | ICD-10-CM

## 2024-11-06 DIAGNOSIS — R53.83 FATIGUE, UNSPECIFIED TYPE: ICD-10-CM

## 2024-11-06 DIAGNOSIS — F17.200 TOBACCO USE DISORDER: ICD-10-CM

## 2024-11-06 DIAGNOSIS — M81.0 SENILE OSTEOPOROSIS: ICD-10-CM

## 2024-11-06 DIAGNOSIS — J44.9 VANISHING LUNG: ICD-10-CM

## 2024-11-06 PROCEDURE — 36415 COLL VENOUS BLD VENIPUNCTURE: CPT

## 2024-11-06 PROCEDURE — 86480 TB TEST CELL IMMUN MEASURE: CPT

## 2024-11-06 PROCEDURE — 80145 DRUG ASSAY ADALIMUMAB: CPT

## 2024-11-06 PROCEDURE — 83520 IMMUNOASSAY QUANT NOS NONAB: CPT

## 2024-11-08 LAB
ADALIMUMAB SERPL IA-MCNC: 2.1 MCG/ML
GAMMA INTERFERON BACKGROUND BLD IA-ACNC: 0.01 IU/ML
M TB IFN-G BLD-IMP: NEGATIVE
M TB IFN-G CD4+ BCKGRND COR BLD-ACNC: -0.01 IU/ML
M TB IFN-G CD4+CD8+ BCKGRND COR BLD-ACNC: 0 IU/ML
MITOGEN IGNF BCKGRD COR BLD-ACNC: 9.99 IU/ML

## 2024-11-12 LAB — MAYO GENERIC ORDERABLE RESULT: NORMAL

## 2024-11-18 DIAGNOSIS — I10 PRIMARY HYPERTENSION: Chronic | ICD-10-CM

## 2024-11-19 RX ORDER — LISINOPRIL AND HYDROCHLOROTHIAZIDE 20; 25 MG/1; MG/1
1 TABLET ORAL DAILY
Qty: 90 TABLET | Refills: 0 | Status: SHIPPED | OUTPATIENT
Start: 2024-11-19 | End: 2025-11-19

## 2024-11-27 ENCOUNTER — HOSPITAL ENCOUNTER (INPATIENT)
Facility: HOSPITAL | Age: 64
LOS: 2 days | Discharge: HOME | DRG: 193 | End: 2024-11-29
Attending: EMERGENCY MEDICINE | Admitting: INTERNAL MEDICINE
Payer: COMMERCIAL

## 2024-11-27 DIAGNOSIS — E87.6 HYPOKALEMIA: ICD-10-CM

## 2024-11-27 DIAGNOSIS — I50.9 CHF (CONGESTIVE HEART FAILURE): ICD-10-CM

## 2024-11-27 DIAGNOSIS — J44.1 COPD WITH ACUTE EXACERBATION: Primary | ICD-10-CM

## 2024-11-27 DIAGNOSIS — R51.9 HEADACHE: ICD-10-CM

## 2024-11-27 PROBLEM — F17.200 TOBACCO DEPENDENCY: Status: ACTIVE | Noted: 2024-11-27

## 2024-11-27 LAB
ABS NEUT (OLG): 20.03 X10(3)/MCL (ref 2.1–9.2)
ALBUMIN SERPL-MCNC: 2.7 G/DL (ref 3.4–4.8)
ALBUMIN/GLOB SERPL: 0.8 RATIO (ref 1.1–2)
ALLENS TEST BLOOD GAS (OHS): YES
ALP SERPL-CCNC: 146 UNIT/L (ref 40–150)
ALT SERPL-CCNC: 17 UNIT/L (ref 0–55)
ANION GAP SERPL CALC-SCNC: 12 MEQ/L
ANISOCYTOSIS BLD QL SMEAR: ABNORMAL
AST SERPL-CCNC: 14 UNIT/L (ref 5–34)
B PERT.PT PRMT NPH QL NAA+NON-PROBE: NOT DETECTED
BASE EXCESS BLD CALC-SCNC: 15.5 MMOL/L (ref -2–2)
BILIRUB SERPL-MCNC: 0.2 MG/DL
BLOOD GAS SAMPLE TYPE (OHS): ABNORMAL
BNP BLD-MCNC: 129.2 PG/ML
BUN SERPL-MCNC: 20.1 MG/DL (ref 9.8–20.1)
C PNEUM DNA NPH QL NAA+NON-PROBE: NOT DETECTED
CA-I BLD-SCNC: 1.07 MMOL/L (ref 1.12–1.23)
CALCIUM SERPL-MCNC: 9.4 MG/DL (ref 8.4–10.2)
CHLORIDE SERPL-SCNC: 92 MMOL/L (ref 98–107)
CO2 BLDA-SCNC: 42.3 MMOL/L
CO2 SERPL-SCNC: 37 MMOL/L (ref 23–31)
COHGB MFR BLDA: 9.2 % (ref 0.5–1.5)
CREAT SERPL-MCNC: 0.84 MG/DL (ref 0.55–1.02)
CREAT/UREA NIT SERPL: 24
DRAWN BY BLOOD GAS (OHS): ABNORMAL
ERYTHROCYTE [DISTWIDTH] IN BLOOD BY AUTOMATED COUNT: 13.7 % (ref 11.5–17)
FLUAV AG UPPER RESP QL IA.RAPID: NOT DETECTED
FLUBV AG UPPER RESP QL IA.RAPID: NOT DETECTED
GFR SERPLBLD CREATININE-BSD FMLA CKD-EPI: >60 ML/MIN/1.73/M2
GLOBULIN SER-MCNC: 3.4 GM/DL (ref 2.4–3.5)
GLUCOSE SERPL-MCNC: 127 MG/DL (ref 82–115)
HADV DNA NPH QL NAA+NON-PROBE: NOT DETECTED
HCO3 BLDA-SCNC: 40.8 MMOL/L (ref 22–26)
HCOV 229E RNA NPH QL NAA+NON-PROBE: NOT DETECTED
HCOV HKU1 RNA NPH QL NAA+NON-PROBE: NOT DETECTED
HCOV NL63 RNA NPH QL NAA+NON-PROBE: NOT DETECTED
HCOV OC43 RNA NPH QL NAA+NON-PROBE: NOT DETECTED
HCT VFR BLD AUTO: 39.7 % (ref 37–47)
HGB BLD-MCNC: 14 G/DL (ref 12–16)
HMPV RNA NPH QL NAA+NON-PROBE: NOT DETECTED
HPIV1 RNA NPH QL NAA+NON-PROBE: NOT DETECTED
HPIV2 RNA NPH QL NAA+NON-PROBE: NOT DETECTED
HPIV3 RNA NPH QL NAA+NON-PROBE: NOT DETECTED
HPIV4 RNA NPH QL NAA+NON-PROBE: NOT DETECTED
INSTRUMENT WBC (OLG): 22.26 X10(3)/MCL
LPM (OHS): 2
LYMPHOCYTES NFR BLD MANUAL: 1.11 X10(3)/MCL
LYMPHOCYTES NFR BLD MANUAL: 5 %
M PNEUMO DNA NPH QL NAA+NON-PROBE: NOT DETECTED
MACROCYTES BLD QL SMEAR: ABNORMAL
MAGNESIUM SERPL-MCNC: 1.9 MG/DL (ref 1.6–2.6)
MCH RBC QN AUTO: 33.2 PG (ref 27–31)
MCHC RBC AUTO-ENTMCNC: 35.3 G/DL (ref 33–36)
MCV RBC AUTO: 94.1 FL (ref 80–94)
METHGB MFR BLDA: 1 % (ref 0.4–1.5)
MONOCYTES NFR BLD MANUAL: 0.89 X10(3)/MCL (ref 0.1–1.3)
MONOCYTES NFR BLD MANUAL: 4 %
NEUTROPHILS NFR BLD MANUAL: 90 %
NRBC BLD AUTO-RTO: 0 %
O2 HB BLOOD GAS (OHS): 86.7 % (ref 94–97)
OHS QRS DURATION: 78 MS
OHS QTC CALCULATION: 440 MS
OXYGEN DEVICE BLOOD GAS (OHS): ABNORMAL
OXYHGB MFR BLDA: 14 G/DL (ref 12–16)
PCO2 BLDA: 50 MMHG (ref 35–45)
PH BLDA: 7.52 [PH] (ref 7.35–7.45)
PLATELET # BLD AUTO: 635 X10(3)/MCL (ref 130–400)
PLATELET # BLD EST: ABNORMAL 10*3/UL
PMV BLD AUTO: 9.8 FL (ref 7.4–10.4)
PO2 BLDA: 62 MMHG (ref 80–100)
POTASSIUM BLOOD GAS (OHS): 2.2 MMOL/L (ref 3.5–5)
POTASSIUM SERPL-SCNC: 3 MMOL/L (ref 3.5–5.1)
PROT SERPL-MCNC: 6.1 GM/DL (ref 5.8–7.6)
RBC # BLD AUTO: 4.22 X10(6)/MCL (ref 4.2–5.4)
RBC MORPH BLD: ABNORMAL
RSV A 5' UTR RNA NPH QL NAA+PROBE: NOT DETECTED
RSV RNA NPH QL NAA+NON-PROBE: NOT DETECTED
RV+EV RNA NPH QL NAA+NON-PROBE: NOT DETECTED
SAMPLE SITE BLOOD GAS (OHS): ABNORMAL
SAO2 % BLDA: 93.7 %
SARS-COV-2 RNA RESP QL NAA+PROBE: NOT DETECTED
SODIUM BLOOD GAS (OHS): 130 MMOL/L (ref 137–145)
SODIUM SERPL-SCNC: 141 MMOL/L (ref 136–145)
TROPONIN I SERPL-MCNC: 0.02 NG/ML (ref 0–0.04)
WBC # BLD AUTO: 22.26 X10(3)/MCL (ref 4.5–11.5)

## 2024-11-27 PROCEDURE — S4991 NICOTINE PATCH NONLEGEND: HCPCS

## 2024-11-27 PROCEDURE — 94640 AIRWAY INHALATION TREATMENT: CPT

## 2024-11-27 PROCEDURE — 80053 COMPREHEN METABOLIC PANEL: CPT | Performed by: EMERGENCY MEDICINE

## 2024-11-27 PROCEDURE — 94760 N-INVAS EAR/PLS OXIMETRY 1: CPT | Mod: XB

## 2024-11-27 PROCEDURE — 94640 AIRWAY INHALATION TREATMENT: CPT | Mod: XB

## 2024-11-27 PROCEDURE — 99900035 HC TECH TIME PER 15 MIN (STAT)

## 2024-11-27 PROCEDURE — 85027 COMPLETE CBC AUTOMATED: CPT | Performed by: EMERGENCY MEDICINE

## 2024-11-27 PROCEDURE — 99900031 HC PATIENT EDUCATION (STAT)

## 2024-11-27 PROCEDURE — 87486 CHLMYD PNEUM DNA AMP PROBE: CPT | Performed by: EMERGENCY MEDICINE

## 2024-11-27 PROCEDURE — 94799 UNLISTED PULMONARY SVC/PX: CPT

## 2024-11-27 PROCEDURE — 25000003 PHARM REV CODE 250: Performed by: EMERGENCY MEDICINE

## 2024-11-27 PROCEDURE — 83735 ASSAY OF MAGNESIUM: CPT | Performed by: EMERGENCY MEDICINE

## 2024-11-27 PROCEDURE — 96365 THER/PROPH/DIAG IV INF INIT: CPT

## 2024-11-27 PROCEDURE — 63600175 PHARM REV CODE 636 W HCPCS: Performed by: EMERGENCY MEDICINE

## 2024-11-27 PROCEDURE — 21400001 HC TELEMETRY ROOM

## 2024-11-27 PROCEDURE — 36600 WITHDRAWAL OF ARTERIAL BLOOD: CPT

## 2024-11-27 PROCEDURE — 82803 BLOOD GASES ANY COMBINATION: CPT

## 2024-11-27 PROCEDURE — 99285 EMERGENCY DEPT VISIT HI MDM: CPT | Mod: 25

## 2024-11-27 PROCEDURE — 25000242 PHARM REV CODE 250 ALT 637 W/ HCPCS

## 2024-11-27 PROCEDURE — 0241U COVID/RSV/FLU A&B PCR: CPT | Performed by: EMERGENCY MEDICINE

## 2024-11-27 PROCEDURE — 83880 ASSAY OF NATRIURETIC PEPTIDE: CPT | Performed by: EMERGENCY MEDICINE

## 2024-11-27 PROCEDURE — 25000242 PHARM REV CODE 250 ALT 637 W/ HCPCS: Performed by: EMERGENCY MEDICINE

## 2024-11-27 PROCEDURE — 93005 ELECTROCARDIOGRAM TRACING: CPT

## 2024-11-27 PROCEDURE — 93010 ELECTROCARDIOGRAM REPORT: CPT | Mod: ,,, | Performed by: STUDENT IN AN ORGANIZED HEALTH CARE EDUCATION/TRAINING PROGRAM

## 2024-11-27 PROCEDURE — 96375 TX/PRO/DX INJ NEW DRUG ADDON: CPT

## 2024-11-27 PROCEDURE — 27000221 HC OXYGEN, UP TO 24 HOURS

## 2024-11-27 PROCEDURE — 84484 ASSAY OF TROPONIN QUANT: CPT | Performed by: EMERGENCY MEDICINE

## 2024-11-27 PROCEDURE — 63600175 PHARM REV CODE 636 W HCPCS: Performed by: INTERNAL MEDICINE

## 2024-11-27 PROCEDURE — 25000003 PHARM REV CODE 250

## 2024-11-27 RX ORDER — POTASSIUM CHLORIDE 20 MEQ/1
40 TABLET, EXTENDED RELEASE ORAL
Status: COMPLETED | OUTPATIENT
Start: 2024-11-27 | End: 2024-11-27

## 2024-11-27 RX ORDER — IPRATROPIUM BROMIDE AND ALBUTEROL SULFATE 2.5; .5 MG/3ML; MG/3ML
3 SOLUTION RESPIRATORY (INHALATION)
Status: DISCONTINUED | OUTPATIENT
Start: 2024-11-27 | End: 2024-11-29 | Stop reason: HOSPADM

## 2024-11-27 RX ORDER — LISINOPRIL 20 MG/1
20 TABLET ORAL DAILY
Status: DISCONTINUED | OUTPATIENT
Start: 2024-11-28 | End: 2024-11-29 | Stop reason: HOSPADM

## 2024-11-27 RX ORDER — METHYLPREDNISOLONE SOD SUCC 125 MG
125 VIAL (EA) INJECTION
Status: COMPLETED | OUTPATIENT
Start: 2024-11-27 | End: 2024-11-27

## 2024-11-27 RX ORDER — IPRATROPIUM BROMIDE AND ALBUTEROL SULFATE 2.5; .5 MG/3ML; MG/3ML
3 SOLUTION RESPIRATORY (INHALATION)
Status: COMPLETED | OUTPATIENT
Start: 2024-11-27 | End: 2024-11-27

## 2024-11-27 RX ORDER — AMOXICILLIN 250 MG
2 CAPSULE ORAL 2 TIMES DAILY
Status: DISCONTINUED | OUTPATIENT
Start: 2024-11-27 | End: 2024-11-29 | Stop reason: HOSPADM

## 2024-11-27 RX ORDER — ACETAMINOPHEN 325 MG/1
650 TABLET ORAL EVERY 4 HOURS PRN
Status: DISCONTINUED | OUTPATIENT
Start: 2024-11-27 | End: 2024-11-28

## 2024-11-27 RX ORDER — PREDNISONE 20 MG/1
60 TABLET ORAL DAILY
Status: DISCONTINUED | OUTPATIENT
Start: 2024-11-27 | End: 2024-11-28

## 2024-11-27 RX ORDER — ONDANSETRON HYDROCHLORIDE 2 MG/ML
4 INJECTION, SOLUTION INTRAVENOUS EVERY 8 HOURS PRN
Status: DISCONTINUED | OUTPATIENT
Start: 2024-11-27 | End: 2024-11-29 | Stop reason: HOSPADM

## 2024-11-27 RX ORDER — FLUTICASONE FUROATE AND VILANTEROL 100; 25 UG/1; UG/1
1 POWDER RESPIRATORY (INHALATION) DAILY
Status: DISCONTINUED | OUTPATIENT
Start: 2024-11-27 | End: 2024-11-28

## 2024-11-27 RX ORDER — PREDNISONE 20 MG/1
60 TABLET ORAL DAILY
Status: DISCONTINUED | OUTPATIENT
Start: 2024-11-28 | End: 2024-11-27

## 2024-11-27 RX ORDER — ACETAMINOPHEN 325 MG/1
650 TABLET ORAL
Status: COMPLETED | OUTPATIENT
Start: 2024-11-27 | End: 2024-11-27

## 2024-11-27 RX ORDER — OXCARBAZEPINE 300 MG/1
1200 TABLET, FILM COATED ORAL NIGHTLY
Status: DISCONTINUED | OUTPATIENT
Start: 2024-11-27 | End: 2024-11-29 | Stop reason: HOSPADM

## 2024-11-27 RX ORDER — IBUPROFEN 200 MG
1 TABLET ORAL DAILY
Status: DISCONTINUED | OUTPATIENT
Start: 2024-11-27 | End: 2024-11-29 | Stop reason: HOSPADM

## 2024-11-27 RX ORDER — CEFTRIAXONE 1 G/1
1 INJECTION, POWDER, FOR SOLUTION INTRAMUSCULAR; INTRAVENOUS
Status: COMPLETED | OUTPATIENT
Start: 2024-11-27 | End: 2024-11-27

## 2024-11-27 RX ORDER — CEFTRIAXONE 2 G/1
2 INJECTION, POWDER, FOR SOLUTION INTRAMUSCULAR; INTRAVENOUS
Status: DISCONTINUED | OUTPATIENT
Start: 2024-11-28 | End: 2024-11-27

## 2024-11-27 RX ORDER — ACETAMINOPHEN 325 MG/1
650 TABLET ORAL EVERY 8 HOURS PRN
Status: DISCONTINUED | OUTPATIENT
Start: 2024-11-27 | End: 2024-11-28

## 2024-11-27 RX ORDER — CEFTRIAXONE 1 G/1
1 INJECTION, POWDER, FOR SOLUTION INTRAMUSCULAR; INTRAVENOUS
Status: DISCONTINUED | OUTPATIENT
Start: 2024-11-28 | End: 2024-11-28

## 2024-11-27 RX ORDER — AZITHROMYCIN 250 MG/1
250 TABLET, FILM COATED ORAL DAILY
Status: DISCONTINUED | OUTPATIENT
Start: 2024-11-28 | End: 2024-11-28

## 2024-11-27 RX ADMIN — ACETAMINOPHEN 650 MG: 325 TABLET, FILM COATED ORAL at 08:11

## 2024-11-27 RX ADMIN — AZITHROMYCIN MONOHYDRATE 500 MG: 500 INJECTION, POWDER, LYOPHILIZED, FOR SOLUTION INTRAVENOUS at 09:11

## 2024-11-27 RX ADMIN — POTASSIUM CHLORIDE 40 MEQ: 1500 TABLET, EXTENDED RELEASE ORAL at 09:11

## 2024-11-27 RX ADMIN — IPRATROPIUM BROMIDE AND ALBUTEROL SULFATE 3 ML: .5; 3 SOLUTION RESPIRATORY (INHALATION) at 08:11

## 2024-11-27 RX ADMIN — NICOTINE 1 PATCH: 14 PATCH TRANSDERMAL at 01:11

## 2024-11-27 RX ADMIN — OXCARBAZEPINE 1200 MG: 300 TABLET, FILM COATED ORAL at 09:11

## 2024-11-27 RX ADMIN — IPRATROPIUM BROMIDE AND ALBUTEROL SULFATE 3 ML: 2.5; .5 SOLUTION RESPIRATORY (INHALATION) at 12:11

## 2024-11-27 RX ADMIN — IPRATROPIUM BROMIDE AND ALBUTEROL SULFATE 3 ML: .5; 3 SOLUTION RESPIRATORY (INHALATION) at 10:11

## 2024-11-27 RX ADMIN — METHYLPREDNISOLONE SODIUM SUCCINATE 125 MG: 125 INJECTION, POWDER, FOR SOLUTION INTRAMUSCULAR; INTRAVENOUS at 08:11

## 2024-11-27 RX ADMIN — FLUTICASONE FUROATE AND VILANTEROL TRIFENATATE 1 PUFF: 100; 25 POWDER RESPIRATORY (INHALATION) at 01:11

## 2024-11-27 RX ADMIN — PREDNISONE 60 MG: 20 TABLET ORAL at 05:11

## 2024-11-27 RX ADMIN — IPRATROPIUM BROMIDE AND ALBUTEROL SULFATE 3 ML: 2.5; .5 SOLUTION RESPIRATORY (INHALATION) at 08:11

## 2024-11-27 RX ADMIN — CEFTRIAXONE SODIUM 1 G: 1 INJECTION, POWDER, FOR SOLUTION INTRAMUSCULAR; INTRAVENOUS at 09:11

## 2024-11-27 RX ADMIN — IPRATROPIUM BROMIDE AND ALBUTEROL SULFATE 3 ML: 2.5; .5 SOLUTION RESPIRATORY (INHALATION) at 04:11

## 2024-11-27 NOTE — Clinical Note
Diagnosis: COPD with acute exacerbation [992064]   Future Attending Provider: GISSELL NAYAK [[id number]]   Admit to which facility:: [facility] [[id number]]   Reason for IP Medical Treatment  (Clinical interventions that can only be accomplished in the IP setting? ) :: IV steroids, nebs, O2   Plans for Post-Acute care--if anticipated (pick the single best option):: A. No post acute care anticipated at this time

## 2024-11-27 NOTE — H&P
[facility] HOSPITAL MEDICINE - H&P ADMISSION NOTE      Patient Name: Judy Vides  MRN: [id number]  Patient Class: IP- Inpatient   Admission Date: 11/27/2024   Admitting Physician:  Service   Attending Physician: Ramya Mchugh MD  Primary Care Provider: Anita Mcqueen MD  Face-to-Face encounter date: 11/27/2024        CHIEF COMPLAINT     Chief Complaint   Patient presents with    Headache     Pt c/o frontal HA,  82% RA at home.  Pt 87% in triage  on RA. Placed on 4L NC     Shortness of Breath       HISTORY OF PRESENTING ILLNESS   This is a 64 year old female with past history of COPD, HTN, seizure disorder, Crohn's disease, thrombocytosis, tobacco abuse who presented to ED with complaints of headache and shortness of breath. Patient states she thinks headaches are due to decreased oxygen. Took O2 at home this am and was in low 80s. Thinks it has been low for a week. States has also been coughing up greenish sputum for past week. Feels she had fever then but did not take temp. Thought she had a sinus infection and attempted to take tylenol and cough syrup with no relief. Has been using her neb treatments more frequently in this time Reports compliance with inhalers. Still continues to smoke. States headache has resolved. Denies chest pain, nausea, vomiting, diarrhea, dysuria, dizziness, blurry vision, sick contacts.     In ED, afebrile with O2 87% on RA. Labs signficant for elevated WBC 22, platelet 635, K 3, chloride 92, bicarb 37. CXR with trace pleural thickening with pulmonary hyperinflation with chronic changes. No prior imaging to compare. Admit to hospital service for COPD exacerbation.     PAST MEDICAL HISTORY     Past Medical History:   Diagnosis Date    Chronic obstructive pulmonary disease 08/17/2022    Crohn disease     Epilepsy     Generalized anxiety disorder 08/17/2022    Headache 08/17/2022    Hemorrhoids, external 02/23/2023    Hypertension     Neck pain  08/17/2022    Osteoporosis     Seizure disorder 08/17/2022    Tobacco user 08/17/2022    Vitamin B deficiency, unspecified 02/23/2023       PAST SURGICAL HISTORY     Past Surgical History:   Procedure Laterality Date    APPENDECTOMY      CATARACT EXTRACTION      EYE SURGERY         FAMILY HISTORY   Reviewed and noncontributory to this case    SOCIAL HISTORY     Social History     Socioeconomic History    Marital status: Single    Number of children: 0   Occupational History    Occupation: [employer] Employee    Tobacco Use    Smoking status: Every Day     Current packs/day: 1.00     Average packs/day: 0.5 packs/day for 50.9 years (25.9 ttl pk-yrs)     Types: Cigarettes     Start date: 2024    Smokeless tobacco: Never   Substance and Sexual Activity    Alcohol use: Not Currently    Drug use: Not Currently    Sexual activity: Not Currently     Screening for Social Drivers for health:  Patient screened for food insecurity, housing instability, transportation needs, utility difficulties, and interpersonal safety (select all that apply as identified as concern)  []Housing or Food  []Transportation Needs  []Utility Difficulties  []Interpersonal safety  [x]None      HOME MEDICATIONS     Prior to Admission medications    Medication Sig Start Date End Date Taking? Authorizing Provider   albuterol (PROVENTIL) 2.5 mg /3 mL (0.083 %) nebulizer solution USE 1 VIAL IN NEBULIZER 4 TIMES DAILY AS NEEDED FOR WHEEZING *RESCUE 10/24/23  Yes Anita Mcqueen MD   albuterol (PROVENTIL/VENTOLIN HFA) 90 mcg/actuation inhaler Inhale 2 puffs into the lungs every 6 (six) hours as needed for Wheezing. Rescue 4/30/24  Yes Anita Mcqueen MD   budesonide-glycopyr-formoterol (BREZTRI AEROSPHERE) 160-9-4.8 mcg/actuation HFAA Inhale 2 puffs by mouth twice daily 10/3/24  Yes Anita Mcqueen MD   busPIRone (BUSPAR) 5 MG Tab Take 1 tablet (5 mg total) by mouth 3 (three) times daily as needed (anxiety). 11/16/23  Yes Angie Falk NP   calcium  carbonate (OS-NASRIN) 600 mg calcium (1,500 mg) Tab Take 1,200 mg by mouth every evening.   Yes Provider, Historical   cyanocobalamin 1,000 mcg/mL injection Inject 1,000 mcg into the muscle every 7 days. 22  Yes Provider, Historical   ergocalciferol (ERGOCALCIFEROL) 50,000 unit Cap Take 50,000 Units by mouth every 7 days. 22  Yes Provider, Historical   lisinopriL-hydrochlorothiazide (PRINZIDE,ZESTORETIC) 20-25 mg Tab Take 1 tablet by mouth once daily 24 Yes Concepcion Byrne, NP   OXcarbazepine (TRILEPTAL) 600 MG Tab Take 2 tablets (1,200 mg total) by mouth every evening. 23 Yes Angie Falk NP HUMIRA,CF, PEN 40 mg/0.4 mL PnKt SMARTSI Milligram(s) SUB-Q Every 2 Weeks 10/31/22   Provider, Historical   hypromellose (SYSTANE GEL OPHT) Apply 1 drop to eye as needed (dry eyes).    Provider, Historical   nebulizer accessories (NEBULIZER MISC) 1 each by Misc.(Non-Drug; Combo Route) route daily as needed. RAJNI: 99 Dispense with preferred suppies    Rubio Phillips MD   nebulizers Misc 1 each by Misc.(Non-Drug; Combo Route) route daily as needed (PRN COPD). RAJNI: 99 Dispense with preferred supplies 23   Anita Mcqueen MD   zoledronic acid-mannitol & water (RECLAST) 5 mg/100 mL PgBk Inject 5 mg into the vein once. IVPB once yearly    Provider, Historical   HYDROcodone-acetaminophen (NORCO) 5-325 mg per tablet Take 1 tablet by mouth every 6 (six) hours as needed.  Patient not taking: Reported on 10/21/2024 7/10/24 11/27/24  Provider, Historical       ALLERGIES   Patient has no known allergies.          REVIEW OF SYSTEMS   Except as documented above, all other systems reviewed and negative    PHYSICAL EXAM     Vitals:    24 1010   BP: 127/63   Pulse: 87   Resp: 20   Temp:       General:  In no acute distress, resting comfortably with NC in place  Head and neck:  Atraumatic, normocephalic, moist mucous membranes, supple neck  Chest:  Rhonchi and mild wheezes bilaterally    Heart:  S1, S2, no appreciable murmur  Abdomen:  Soft, nontender, BS +  MSK:  Warm, no lower extremity edema, no clubbing or cyanosis  Neuro:  Alert and oriented x4, moving all extremities with good strength  Integumentary:  No obvious skin rash  Psychiatry:  Appropriate mood and affect          ASSESSMENT AND PLAN   COPD exacerbation   SIRS positive 2/2 to above   Concern for atypical pna  Metabolic alkalosis   HTN   Thrombocytosis   Seizure disorder  Crohn's disease   Hypokalemia     CT chest noncontrast to further evaluate changes in lungs   Continue COPD exacerbation treatment with prednisone, rocephin, azithro and nebs  Continue patient's home inhaler   No clear cause for patient's alkalosis at this time. Thiazide diuretic use may be contributing.   Home medications restarted as appropriate   Chronic thrombocytosis followed by heme outpatient   Replete electrolytes as needed     DVT prophylaxis:  scd  __________________________________________________________________  LABS/MICRO/MEDS/DIAGNOSTICS       LABS  Recent Labs     11/27/24  0821      K 3.0*   CO2 37*   BUN 20.1   CREATININE 0.84   GLUCOSE 127*   CALCIUM 9.4   ALKPHOS 146   AST 14   ALT 17   ALBUMIN 2.7*     Recent Labs     11/27/24  0821   WBC 22.26  22.26*   RBC 4.22   HCT 39.7   MCV 94.1*   *       MICROBIOLOGY  Microbiology Results (last 7 days)       ** No results found for the last 168 hours. **            MEDICATIONS   albuterol-ipratropium  3 mL Nebulization Q4H WAKE    [START ON 11/28/2024] azithromycin  250 mg Oral Daily    [START ON 11/28/2024] cefTRIAXone (Rocephin) IV (PEDS and ADULTS)  1 g Intravenous Q24H    fluticasone furoate-vilanteroL  1 puff Inhalation Daily    [START ON 11/28/2024] lisinopriL  20 mg Oral Daily    nicotine  1 patch Transdermal Daily    OXcarbazepine  1,200 mg Oral QHS    [START ON 11/28/2024] predniSONE  60 mg Oral Daily      INFUSIONS      DIAGNOSTIC TESTS  CT Head Without Contrast   Final Result       1. No acute intracranial abnormality.   2. Chronic microvascular ischemic changes.         Electronically signed by: Donya Saldaña   Date:    11/27/2024   Time:    08:29      X-Ray Chest AP Portable   Final Result      Trace left pleural fluid or thickening with pulmonary hyperinflation and chronic interstitial changes bilaterally         Electronically signed by: Albert Currie MD   Date:    11/27/2024   Time:    08:13      CT Chest Without Contrast    (Results Pending)          Patient information was obtained from patient, patient's family, past medical records and ER records.   All diagnosis and differential diagnosis have been reviewed; assessment and plan has been documented. I have personally reviewed the labs and test results that are presently available; I have reviewed the patients medication list. I have reviewed the consulting providers response and recommendations. I have reviewed or attempted to review medical records based upon their availability.  All of the patient's questions have been addressed and answered. Patient's is agreeable to the above stated plan. I will continue to monitor closely and make adjustments to medical management as needed.  This note was created using XYZE voice recognition software that occasionally misinterpreted phrases or words.  Please contact me if any questions may rise regarding documentation to clarify verbiage.        Ramya Mchugh MD   Internal Medicine  Department of Hospital Medicine [facility]

## 2024-11-27 NOTE — ED PROVIDER NOTES
Encounter Date: 11/27/2024       History     Chief Complaint   Patient presents with    Headache     Pt c/o frontal HA,  82% RA at home.  Pt 87% in triage  on RA. Placed on 4L NC     Shortness of Breath     64 F h/o COPD, tobacco use, seizures, crohn's disease presents emergency department with a headache.  States she has been getting headaches off and on for the last few months but it has been worse the last few days.  Went to an urgent care 3 days ago for a pulled muscle lower left lower rib states she has been using topical pain patches in that that pain in his relieved.  While at the urgent care she was told she was hypoxic at 84% advised to come to the emergency department but states she did not come instead she went home.  Headaches has been worse last few days.  Using breathing treatments at home states that helps with the shortness of breath when it develops.  She denies any fevers numbness weakness chest pain , visual changes or other symptoms.        Review of patient's allergies indicates:  No Known Allergies  Past Medical History:   Diagnosis Date    Chronic obstructive pulmonary disease 08/17/2022    Crohn disease     Epilepsy     Generalized anxiety disorder 08/17/2022    Headache 08/17/2022    Hemorrhoids, external 02/23/2023    Hypertension     Neck pain 08/17/2022    Osteoporosis     Seizure disorder 08/17/2022    Tobacco user 08/17/2022    Vitamin B deficiency, unspecified 02/23/2023     Past Surgical History:   Procedure Laterality Date    APPENDECTOMY      CATARACT EXTRACTION      EYE SURGERY       Family History   Problem Relation Name Age of Onset    Heart disease Mother      Emphysema Father       Social History     Tobacco Use    Smoking status: Every Day     Current packs/day: 1.00     Average packs/day: 0.5 packs/day for 50.9 years (25.9 ttl pk-yrs)     Types: Cigarettes     Start date: 2024    Smokeless tobacco: Never   Substance Use Topics    Alcohol use: Not Currently    Drug use: Not  Currently     Review of Systems   Constitutional:  Negative for chills and fever.   Respiratory:  Positive for shortness of breath. Negative for cough.    Cardiovascular:  Negative for chest pain.   Gastrointestinal:  Negative for abdominal pain, nausea and vomiting.   Musculoskeletal:  Negative for myalgias.   All other systems reviewed and are negative.      Physical Exam     Initial Vitals [11/27/24 0740]   BP Pulse Resp Temp SpO2   127/65 91 18 98 °F (36.7 °C) (!) 87 %      MAP       --         Physical Exam    Nursing note and vitals reviewed.  Constitutional: She appears well-developed and well-nourished. No distress.   HENT:   Head: Normocephalic and atraumatic.   Eyes: Conjunctivae are normal.   Cardiovascular:  Normal rate and intact distal pulses.           Pulmonary/Chest: No respiratory distress. She has wheezes. She has no rhonchi.   Tachypneic.  Sounds slightly diminished in the bases with a expiratory wheeze.  Barrel-shaped chest   Abdominal: Abdomen is soft. Bowel sounds are normal. There is no abdominal tenderness. There is no rebound and no guarding.   Musculoskeletal:         General: No edema.     Neurological: She is alert. She has normal strength.   Skin: Skin is warm and dry.   Psychiatric: She has a normal mood and affect.         ED Course   Procedures  Labs Reviewed   COMPREHENSIVE METABOLIC PANEL - Abnormal       Result Value    Sodium 141      Potassium 3.0 (*)     Chloride 92 (*)     CO2 37 (*)     Glucose 127 (*)     Blood Urea Nitrogen 20.1      Creatinine 0.84      Calcium 9.4      Protein Total 6.1      Albumin 2.7 (*)     Globulin 3.4      Albumin/Globulin Ratio 0.8 (*)     Bilirubin Total 0.2            ALT 17      AST 14      eGFR >60      Anion Gap 12.0      BUN/Creatinine Ratio 24     BLOOD GAS - Abnormal    Sample Type Arterial Blood      Sample site Left Brachial Artery      Drawn by sd rrt      pH, Blood gas 7.520 (*)     pCO2, Blood gas 50.0 (*)     pO2, Blood gas  62.0 (*)     Sodium, Blood Gas 130 (*)     Potassium, Blood Gas 2.2 (*)     Calcium Level Ionized 1.07 (*)     TOC2, Blood gas 42.3      Base Excess, Blood gas 15.50 (*)     sO2, Blood gas 93.7      HCO3, Blood gas 40.8 (*)     THb, Blood gas 14.0      O2 Hb, Blood Gas 86.7 (*)     CO Hgb 9.2 (*)     Met Hgb 1.0      Allens Test Yes      Oxygen Device, Blood gas Cannula      LPM 2     CBC WITH DIFFERENTIAL - Abnormal    WBC 22.26 (*)     RBC 4.22      Hgb 14.0      Hct 39.7      MCV 94.1 (*)     MCH 33.2 (*)     MCHC 35.3      RDW 13.7      Platelet 635 (*)     MPV 9.8      NRBC% 0.0     B-TYPE NATRIURETIC PEPTIDE - Abnormal    Natriuretic Peptide 129.2 (*)    MANUAL DIFFERENTIAL - Abnormal    WBC 22.26      Neutrophils % 90      Lymphs % 5      Monocytes % 4      Neutrophils Abs 20.034 (*)     Lymphs Abs 1.113      Monocytes Abs 0.8904      Platelets Increased (*)     RBC Morph Abnormal (*)     Anisocytosis 1+ (*)     Macrocytosis 1+ (*)    TROPONIN I - Normal    Troponin-I 0.024     MAGNESIUM - Normal    Magnesium Level 1.90     CBC W/ AUTO DIFFERENTIAL    Narrative:     The following orders were created for panel order CBC auto differential.  Procedure                               Abnormality         Status                     ---------                               -----------         ------                     CBC with Differential[[id number]]       Abnormal            Final result               Manual Differential[[id number]]         Abnormal            Final result                 Please view results for these tests on the individual orders.   COVID/RSV/FLU A&B PCR   RESPIRATORY PANEL     EKG Readings: (Independently Interpreted)   Initial Reading: No STEMI. Rhythm: Normal Sinus Rhythm. Heart Rate: 86. Ectopy: No Ectopy. ST Segments: Non-Specific ST Segment Depression. Axis: Normal. Clinical Impression: Normal Sinus Rhythm   11/27/2024 @ 0742     ECG Results              EKG 12-lead (Final result)         Collection Time Result Time QRS Duration OHS QTC Calculation    11/27/24 07:42:17 11/27/24 08:59:21 78 440                     Final result by Interface, Lab In [facility] (11/27/24 08:59:23)                   Narrative:    Test Reason : R51.9,    Vent. Rate :  86 BPM     Atrial Rate :  86 BPM     P-R Int : 136 ms          QRS Dur :  78 ms      QT Int : 368 ms       P-R-T Axes :  90  81  71 degrees    QTcB Int : 440 ms    Normal sinus rhythm  Nonspecific ST abnormality  Abnormal ECG  No previous ECGs available  Confirmed by Dinah Santos ([id number]) on 11/27/2024 8:59:16 AM    Referred By: WINSTON GARY           Confirmed By: Dinah Santos                                  Imaging Results              CT Head Without Contrast (Final result)  Result time 11/27/24 08:29:33      Final result by Donya Saldaña MD (11/27/24 08:29:33)                   Impression:      1. No acute intracranial abnormality.  2. Chronic microvascular ischemic changes.      Electronically signed by: Donya Saldaña  Date:    11/27/2024  Time:    08:29               Narrative:    EXAMINATION:  CT HEAD WITHOUT CONTRAST    CLINICAL HISTORY:  Headache, new or worsening (Age >= 50y);    TECHNIQUE:  Axial scans were obtained from skull base to the vertex.    Coronal and sagittal reconstructions obtained from the axial data.    Automatic exposure control was utilized to limit radiation dose.    Contrast: None    Radiation Dose:    Total DLP: 881 mGy*cm    COMPARISON:  None    FINDINGS:  There is no acute intracranial hemorrhage or edema. The gray-white matter differentiation is preserved.  Scattered hypodensities in the subcortical and periventricular white matter and right cerebellum likely represent chronic microvascular ischemic changes.    There is no mass effect or midline shift. The ventricles and sulci are normal in size. The basal cisterns are patent. There is no abnormal extra-axial fluid collection.    The calvarium and skull  base are intact.  There is trace scattered paranasal sinus mucosal thickening.                                       X-Ray Chest AP Portable (Final result)  Result time 11/27/24 08:13:36      Final result by Albert Currie MD (11/27/24 08:13:36)                   Impression:      Trace left pleural fluid or thickening with pulmonary hyperinflation and chronic interstitial changes bilaterally      Electronically signed by: Albert Currie MD  Date:    11/27/2024  Time:    08:13               Narrative:    EXAMINATION:  XR CHEST AP PORTABLE    CLINICAL HISTORY:  hypoxia;    COMPARISON:  05/25/2023    FINDINGS:  Single view of the chest shows pulmonary hyperinflation with chronic interstitial changes bilaterally.  Trace left pleural fluid or thickening is present without pneumothorax.  No lobar type consolidation.  Heart is upper normal in size.  Aorta is partially calcified.                                       Medications   azithromycin (ZITHROMAX) 500 mg in 0.9% NaCl 250 mL IVPB (admixture device) (500 mg Intravenous New Bag 11/27/24 0949)   acetaminophen tablet 650 mg (650 mg Oral Given 11/27/24 0831)   albuterol-ipratropium 2.5 mg-0.5 mg/3 mL nebulizer solution 3 mL (3 mLs Nebulization Given 11/27/24 0827)   methylPREDNISolone sodium succinate injection 125 mg (125 mg Intravenous Given 11/27/24 0831)   cefTRIAXone injection 1 g (1 g Intravenous Given 11/27/24 0942)   potassium chloride SA CR tablet 40 mEq (40 mEq Oral Given 11/27/24 0942)   albuterol-ipratropium 2.5 mg-0.5 mg/3 mL nebulizer solution 3 mL (3 mLs Nebulization Given 11/27/24 1001)     Medical Decision Making  Hypoxic when she checked in 87%, reports 82% at home.  Was 84 % 3 days ago at an urgent care but did not come to the hospital.  She denies being on home O2 suspect worsening hypoxia cause the headaches to be worse severe over last few days.  Cranial nerves 2-12 are intact no focal neurologic deficits.  She was awake and alert on supplemental  oxygen satting 94%.  Does have some wheezing.  I have ordered DuoNeb and Solu-Medrol continue supplemental oxygen    Problems Addressed:  COPD with acute exacerbation: acute illness or injury  Hypokalemia: acute illness or injury    Amount and/or Complexity of Data Reviewed  Labs: ordered.  Radiology: ordered.    Risk  OTC drugs.  Prescription drug management.  Decision regarding hospitalization.      ED assessment:    I assumed care of this patient from Dr. Guaman 9:00 a.m. pending laboratory studies and disposition.  Presented hypoxic with headache, notably was hypoxic in the clinic a few days ago as well.  Placed on supplemental O2 with some improvement.  Wheezing noted on initial examination.  Given neb treatment, steroids.    Differential diagnosis (including but not limited to):   COPD with acute exacerbation, pneumonia, acute viral syndrome, COVID-19, influenza, hypoxic respiratory failure, congestive heart failure    ED management:   CXR w/o focal findings. Labs unremarkable as well. Improving w/ neb treatments, steroids but still requiring O2 to maintain SPO2 > 90%. [facility] w/ hypoxia.     My independent radiology interpretation:   Chest x-ray: No dense infiltrate, hyperinflation      Amount and/or Complexity of Data Reviewed  Independent historian: none   Summary of history:   External data reviewed: notes from clinic visits  Summary of data reviewed:  Urgent care visit 11/24/2024, hypoxic on that presentation with SpO2 84%, instructed to come to the emergency department at that time; however, patient refused transfer.  Risk and benefits of testing: discussed   Labs: ordered and reviewed  Radiology: ordered and independent interpretation performed (see above or ED course)  ECG/medicine tests: ordered and independent interpretation performed (see above or ED course)  Discussion of management or test interpretation with external provider(s): discussed with hospitalist  physician   Summary of discussion: as below    Risk  Prescription drug management   Decision regarding hospitalization  Shared decision making     Critical Care  none    I, Meliza Mirza MD personally performed the history, PE, MDM, and procedures as documented above and agree with the scribe's documentation.              ED Course as of 11/27/24 1039   Wed Nov 27, 2024   0840 Respiratory reported that carboxyhemoglobin was 9.5.  Patient does smoke and has COPD.  Likely the cause.  She denies any gas heaters in the home including no gas stove use to heat the house [LF]   0841 ABG showed potassium low at 2.2 will confirmed with in-lab chemistry [LF]   1030 Hospitalist paged [JM]   1038 Discussed with Rebeca Morales NP with hospitalist who accepts for admission on behalf of Dr. Sanchez [KS]      ED Course User Index  [JM] Lorenzo Beard  [KS] Meliza Mirza MD  [LF] Candido Guaman MD                           Clinical Impression:  Final diagnoses:  [J44.1] COPD with acute exacerbation (Primary)  [E87.6] Hypokalemia          ED Disposition Condition    Admit                 Meliza Mirza MD  11/29/24 4723

## 2024-11-28 LAB
ALBUMIN SERPL-MCNC: 2.4 G/DL (ref 3.4–4.8)
ALBUMIN/GLOB SERPL: 0.7 RATIO (ref 1.1–2)
ALP SERPL-CCNC: 139 UNIT/L (ref 40–150)
ALT SERPL-CCNC: 17 UNIT/L (ref 0–55)
ANION GAP SERPL CALC-SCNC: 11 MEQ/L
ANION GAP SERPL CALC-SCNC: 13 MEQ/L
AST SERPL-CCNC: 16 UNIT/L (ref 5–34)
BASOPHILS # BLD AUTO: 0.03 X10(3)/MCL
BASOPHILS NFR BLD AUTO: 0.2 %
BILIRUB SERPL-MCNC: 0.1 MG/DL
BUN SERPL-MCNC: 24.3 MG/DL (ref 9.8–20.1)
BUN SERPL-MCNC: 25.9 MG/DL (ref 9.8–20.1)
CALCIUM SERPL-MCNC: 9 MG/DL (ref 8.4–10.2)
CALCIUM SERPL-MCNC: 9.1 MG/DL (ref 8.4–10.2)
CHLORIDE SERPL-SCNC: 93 MMOL/L (ref 98–107)
CHLORIDE SERPL-SCNC: 97 MMOL/L (ref 98–107)
CO2 SERPL-SCNC: 35 MMOL/L (ref 23–31)
CO2 SERPL-SCNC: 35 MMOL/L (ref 23–31)
CREAT SERPL-MCNC: 0.68 MG/DL (ref 0.55–1.02)
CREAT SERPL-MCNC: 0.72 MG/DL (ref 0.55–1.02)
CREAT/UREA NIT SERPL: 36
CREAT/UREA NIT SERPL: 36
EOSINOPHIL # BLD AUTO: 0 X10(3)/MCL (ref 0–0.9)
EOSINOPHIL NFR BLD AUTO: 0 %
ERYTHROCYTE [DISTWIDTH] IN BLOOD BY AUTOMATED COUNT: 13.9 % (ref 11.5–17)
GFR SERPLBLD CREATININE-BSD FMLA CKD-EPI: >60 ML/MIN/1.73/M2
GFR SERPLBLD CREATININE-BSD FMLA CKD-EPI: >60 ML/MIN/1.73/M2
GLOBULIN SER-MCNC: 3.6 GM/DL (ref 2.4–3.5)
GLUCOSE SERPL-MCNC: 148 MG/DL (ref 82–115)
GLUCOSE SERPL-MCNC: 150 MG/DL (ref 82–115)
HCT VFR BLD AUTO: 36.8 % (ref 37–47)
HGB BLD-MCNC: 12.6 G/DL (ref 12–16)
IMM GRANULOCYTES # BLD AUTO: 0.17 X10(3)/MCL (ref 0–0.04)
IMM GRANULOCYTES NFR BLD AUTO: 0.9 %
LYMPHOCYTES # BLD AUTO: 0.57 X10(3)/MCL (ref 0.6–4.6)
LYMPHOCYTES NFR BLD AUTO: 3.2 %
MCH RBC QN AUTO: 32.9 PG (ref 27–31)
MCHC RBC AUTO-ENTMCNC: 34.2 G/DL (ref 33–36)
MCV RBC AUTO: 96.1 FL (ref 80–94)
MONOCYTES # BLD AUTO: 0.32 X10(3)/MCL (ref 0.1–1.3)
MONOCYTES NFR BLD AUTO: 1.8 %
NEUTROPHILS # BLD AUTO: 16.93 X10(3)/MCL (ref 2.1–9.2)
NEUTROPHILS NFR BLD AUTO: 93.9 %
NRBC BLD AUTO-RTO: 0 %
PLATELET # BLD AUTO: 574 X10(3)/MCL (ref 130–400)
PMV BLD AUTO: 9.9 FL (ref 7.4–10.4)
POTASSIUM SERPL-SCNC: 2.7 MMOL/L (ref 3.5–5.1)
POTASSIUM SERPL-SCNC: 3.5 MMOL/L (ref 3.5–5.1)
PROT SERPL-MCNC: 6 GM/DL (ref 5.8–7.6)
RBC # BLD AUTO: 3.83 X10(6)/MCL (ref 4.2–5.4)
SODIUM SERPL-SCNC: 141 MMOL/L (ref 136–145)
SODIUM SERPL-SCNC: 143 MMOL/L (ref 136–145)
WBC # BLD AUTO: 18.02 X10(3)/MCL (ref 4.5–11.5)

## 2024-11-28 PROCEDURE — 94761 N-INVAS EAR/PLS OXIMETRY MLT: CPT

## 2024-11-28 PROCEDURE — 86480 TB TEST CELL IMMUN MEASURE: CPT | Performed by: INTERNAL MEDICINE

## 2024-11-28 PROCEDURE — 99900035 HC TECH TIME PER 15 MIN (STAT)

## 2024-11-28 PROCEDURE — S4991 NICOTINE PATCH NONLEGEND: HCPCS

## 2024-11-28 PROCEDURE — 36415 COLL VENOUS BLD VENIPUNCTURE: CPT | Performed by: INTERNAL MEDICINE

## 2024-11-28 PROCEDURE — 25000003 PHARM REV CODE 250: Performed by: INTERNAL MEDICINE

## 2024-11-28 PROCEDURE — 25000003 PHARM REV CODE 250: Performed by: NURSE PRACTITIONER

## 2024-11-28 PROCEDURE — 25000242 PHARM REV CODE 250 ALT 637 W/ HCPCS

## 2024-11-28 PROCEDURE — 36415 COLL VENOUS BLD VENIPUNCTURE: CPT | Performed by: NURSE PRACTITIONER

## 2024-11-28 PROCEDURE — 63700000 PHARM REV CODE 250 ALT 637 W/O HCPCS

## 2024-11-28 PROCEDURE — 85025 COMPLETE CBC W/AUTO DIFF WBC: CPT | Performed by: NURSE PRACTITIONER

## 2024-11-28 PROCEDURE — 99900031 HC PATIENT EDUCATION (STAT)

## 2024-11-28 PROCEDURE — 80053 COMPREHEN METABOLIC PANEL: CPT | Performed by: NURSE PRACTITIONER

## 2024-11-28 PROCEDURE — 25000003 PHARM REV CODE 250

## 2024-11-28 PROCEDURE — 87449 NOS EACH ORGANISM AG IA: CPT | Performed by: INTERNAL MEDICINE

## 2024-11-28 PROCEDURE — 63600175 PHARM REV CODE 636 W HCPCS: Performed by: NURSE PRACTITIONER

## 2024-11-28 PROCEDURE — 27000221 HC OXYGEN, UP TO 24 HOURS

## 2024-11-28 PROCEDURE — 94640 AIRWAY INHALATION TREATMENT: CPT

## 2024-11-28 PROCEDURE — 63600175 PHARM REV CODE 636 W HCPCS: Performed by: INTERNAL MEDICINE

## 2024-11-28 PROCEDURE — 21400001 HC TELEMETRY ROOM

## 2024-11-28 PROCEDURE — 94760 N-INVAS EAR/PLS OXIMETRY 1: CPT

## 2024-11-28 RX ORDER — GUAIFENESIN AND DEXTROMETHORPHAN HYDROBROMIDE 10; 100 MG/5ML; MG/5ML
10 SYRUP ORAL EVERY 6 HOURS
Status: DISCONTINUED | OUTPATIENT
Start: 2024-11-28 | End: 2024-11-29 | Stop reason: HOSPADM

## 2024-11-28 RX ORDER — ACETAMINOPHEN 500 MG
1000 TABLET ORAL EVERY 6 HOURS PRN
Status: DISCONTINUED | OUTPATIENT
Start: 2024-11-28 | End: 2024-11-29 | Stop reason: HOSPADM

## 2024-11-28 RX ORDER — LEVOFLOXACIN 5 MG/ML
750 INJECTION, SOLUTION INTRAVENOUS
Status: DISCONTINUED | OUTPATIENT
Start: 2024-11-28 | End: 2024-11-29 | Stop reason: HOSPADM

## 2024-11-28 RX ORDER — ENOXAPARIN SODIUM 100 MG/ML
40 INJECTION SUBCUTANEOUS EVERY 24 HOURS
Status: DISCONTINUED | OUTPATIENT
Start: 2024-11-28 | End: 2024-11-29 | Stop reason: HOSPADM

## 2024-11-28 RX ORDER — POTASSIUM CHLORIDE 14.9 MG/ML
20 INJECTION INTRAVENOUS
Status: COMPLETED | OUTPATIENT
Start: 2024-11-28 | End: 2024-11-28

## 2024-11-28 RX ORDER — BUDESONIDE 0.5 MG/2ML
0.5 INHALANT ORAL EVERY 12 HOURS
Status: DISCONTINUED | OUTPATIENT
Start: 2024-11-28 | End: 2024-11-29 | Stop reason: HOSPADM

## 2024-11-28 RX ORDER — POTASSIUM CHLORIDE 20 MEQ/1
40 TABLET, EXTENDED RELEASE ORAL ONCE
Status: COMPLETED | OUTPATIENT
Start: 2024-11-28 | End: 2024-11-28

## 2024-11-28 RX ADMIN — ENOXAPARIN SODIUM 40 MG: 40 INJECTION SUBCUTANEOUS at 04:11

## 2024-11-28 RX ADMIN — LEVOFLOXACIN 750 MG: 750 INJECTION, SOLUTION INTRAVENOUS at 09:11

## 2024-11-28 RX ADMIN — CEFTRIAXONE SODIUM 1 G: 1 INJECTION, POWDER, FOR SOLUTION INTRAMUSCULAR; INTRAVENOUS at 09:11

## 2024-11-28 RX ADMIN — IPRATROPIUM BROMIDE AND ALBUTEROL SULFATE 3 ML: 2.5; .5 SOLUTION RESPIRATORY (INHALATION) at 12:11

## 2024-11-28 RX ADMIN — GUAIFENESIN AND DEXTROMETHORPHAN 10 ML: 100; 10 SYRUP ORAL at 05:11

## 2024-11-28 RX ADMIN — IPRATROPIUM BROMIDE AND ALBUTEROL SULFATE 3 ML: 2.5; .5 SOLUTION RESPIRATORY (INHALATION) at 08:11

## 2024-11-28 RX ADMIN — POTASSIUM CHLORIDE 20 MEQ: 14.9 INJECTION INTRAVENOUS at 09:11

## 2024-11-28 RX ADMIN — PREDNISONE 60 MG: 20 TABLET ORAL at 09:11

## 2024-11-28 RX ADMIN — AZITHROMYCIN DIHYDRATE 250 MG: 250 TABLET ORAL at 09:11

## 2024-11-28 RX ADMIN — POTASSIUM CHLORIDE 20 MEQ: 14.9 INJECTION INTRAVENOUS at 05:11

## 2024-11-28 RX ADMIN — GUAIFENESIN AND DEXTROMETHORPHAN 10 ML: 100; 10 SYRUP ORAL at 12:11

## 2024-11-28 RX ADMIN — SENNOSIDES AND DOCUSATE SODIUM 2 TABLET: 50; 8.6 TABLET ORAL at 09:11

## 2024-11-28 RX ADMIN — OXCARBAZEPINE 1200 MG: 300 TABLET, FILM COATED ORAL at 09:11

## 2024-11-28 RX ADMIN — POTASSIUM CHLORIDE 40 MEQ: 1500 TABLET, EXTENDED RELEASE ORAL at 05:11

## 2024-11-28 RX ADMIN — IPRATROPIUM BROMIDE AND ALBUTEROL SULFATE 3 ML: 2.5; .5 SOLUTION RESPIRATORY (INHALATION) at 04:11

## 2024-11-28 RX ADMIN — LISINOPRIL 20 MG: 20 TABLET ORAL at 09:11

## 2024-11-28 RX ADMIN — NICOTINE 1 PATCH: 14 PATCH TRANSDERMAL at 09:11

## 2024-11-28 RX ADMIN — FLUTICASONE FUROATE AND VILANTEROL TRIFENATATE 1 PUFF: 100; 25 POWDER RESPIRATORY (INHALATION) at 12:11

## 2024-11-28 RX ADMIN — IPRATROPIUM BROMIDE AND ALBUTEROL SULFATE 3 ML: 2.5; .5 SOLUTION RESPIRATORY (INHALATION) at 07:11

## 2024-11-28 NOTE — PLAN OF CARE
Problem: Adult Inpatient Plan of Care  Goal: Plan of Care Review  Outcome: Progressing  Flowsheets (Taken 11/28/2024 1651)  Plan of Care Reviewed With: patient     Problem: Fall Injury Risk  Goal: Absence of Fall and Fall-Related Injury  Outcome: Progressing  Intervention: Identify and Manage Contributors  Flowsheets (Taken 11/28/2024 1651)  Self-Care Promotion: independence encouraged  Medication Review/Management:   medications reviewed   provider consulted  Intervention: Promote Injury-Free Environment  Flowsheets (Taken 11/28/2024 1651)  Safety Promotion/Fall Prevention:   assistive device/personal item within reach   side rails raised x 2   room near unit station   medications reviewed     Problem: Airway Clearance Ineffective  Goal: Effective Airway Clearance  Outcome: Progressing  Intervention: Promote Airway Secretion Clearance  Flowsheets (Taken 11/28/2024 1651)  Breathing Techniques/Airway Clearance: deep/controlled cough encouraged  Cough And Deep Breathing: done independently per patient  Activity Management: Ambulated to bathroom - L4     Problem: COPD (Chronic Obstructive Pulmonary Disease)  Goal: Optimal Level of Functional [place]  Outcome: Progressing  Intervention: Optimize Functional Ability  Flowsheets (Taken 11/28/2024 1651)  Self-Care Promotion: independence encouraged  Activity Management: Ambulated to bathroom - L4  Goal: Absence of Infection Signs and Symptoms  Outcome: Progressing  Intervention: Prevent or Manage Infection  Flowsheets (Taken 11/28/2024 1651)  Fever Reduction/Comfort Measures: fluid intake increased  Goal: Improved Oral Intake  Outcome: Progressing  Intervention: Promote and Optimize Oral Intake  Flowsheets (Taken 11/28/2024 1651)  Oral Nutrition Promotion:   physical activity promoted   rest periods promoted  Goal: Effective Oxygenation and Ventilation  Outcome: Progressing  Intervention: Promote Airway Secretion Clearance  Flowsheets (Taken 11/28/2024  1651)  Breathing Techniques/Airway Clearance: deep/controlled cough encouraged  Cough And Deep Breathing: done independently per patient  Activity Management: Ambulated to bathroom - L4  Intervention: Optimize Oxygenation and Ventilation  Flowsheets (Taken 11/28/2024 1651)  Airway/Ventilation Management: airway patency maintained  Fluid/Electrolyte Management:   electrolyte supplement initiated   fluids provided  Head of Bed (HOB) Positioning: HOB at 30-45 degrees

## 2024-11-28 NOTE — PROGRESS NOTES
[facility] Hospital Medicine Progress Note        Chief Complaint: Inpatient Follow-up for headache and SOB.    HPI:   63 yo female with PMHx of life long smoker,  COPD, HTN, seizure disorder, Crohn's disease on Humira, thrombocytosis ( recently evaluated by Dr. Kc)  presented to ED with complaints of headache and shortness of breath. Patient states she thinks headaches are due to decreased oxygen. Took O2 at home this am and was in low 80s. Thinks it has been low for a week. States has also been coughing up greenish sputum for the past week. Feels she had fever then but did not take temp. Thought she had a sinus infection and attempted to take tylenol and cough syrup with no relief. Has been using her neb treatments more frequently in this time with minimal help. Reports compliance with inhalers. Still continues to smoke. States headache has resolved. Denies chest pain, nausea, vomiting, diarrhea, dysuria, dizziness, blurry vision, sick contacts.      In ED, afebrile with O2 87% on RA. Labs signficant for elevated WBC 22, platelet 635, K 3.0, chloride 92, bicarb 37. CXR with trace pleural thickening with pulmonary hyperinflation with chronic changes. No prior imaging to compare. Admitted to  service for COPD exacerbation, acute hypoxic resp failure and severe leukocytosis. CT chest done on 11/27 showed scattered patchy nodular opacities in both lungs with few scattered small areas of subpleural consolidation likely infectious or inflammatory with chronic indolent infection possible per radiology read. Pt was started on supplemental oxygen, Antibiotic Azithromycin /Ceftriaxone, systemic steroid, aggressive nebulized bronchodilators and ICS. Antibiotic change to respiratory flouroquinolone Levofloxacin to cover pseudomonas.      Interval Hx:   Pt reports feeling better. Still has cough but sputum clearing up. SOB is improving. No further headaches     Afebrile, Hemodynamics are  stable. On Low dose O2 via NC 2L/min.  Labs with WBC down to 18K, Hgb 12.6, Plt down to 574, Bicarb 35, Cr 0.7, K 3.5    Case was discussed with patient's nurse and  on the floor.    Objective/physical exam:  General: In no acute distress, afebrile, on NC  Chest: faint wheezes darien  Heart: RRR, +S1, S2, no appreciable murmur  Abdomen: Soft, nontender, BS +  MSK: Warm, no lower extremity edema, no clubbing or cyanosis  Neurologic: Alert and oriented x4, Cranial nerve II-XII intact, Strength 5/5 in all 4 extremities    VITAL SIGNS: 24 HRS MIN & MAX LAST   Temp  Min: 97.8 °F (36.6 °C)  Max: 98.8 °F (37.1 °C) 98 °F (36.7 °C)   BP  Min: 119/69  Max: 159/76 (!) 143/68   Pulse  Min: 78  Max: 100  95   Resp  Min: 17  Max: 27 18   SpO2  Min: 92 %  Max: 96 % (!) 94 %     I have reviewed the following labs:  Recent Labs   Lab 11/27/24  0821 11/28/24  0320   WBC 22.26  22.26* 18.02*   RBC 4.22 3.83*   HGB 14.0 12.6   HCT 39.7 36.8*   MCV 94.1* 96.1*   MCH 33.2* 32.9*   MCHC 35.3 34.2   RDW 13.7 13.9   * 574*   MPV 9.8 9.9     Recent Labs   Lab 11/27/24  0821 11/27/24  0827 11/28/24  0320 11/28/24  1148     --  141 143   K 3.0*  --  2.7* 3.5   CL 92*  --  93* 97*   CO2 37*  --  35* 35*   BUN 20.1  --  24.3* 25.9*   CREATININE 0.84  --  0.68 0.72   CALCIUM 9.4  --  9.1 9.0   PH  --  7.520*  --   --    MG 1.90  --   --   --    ALBUMIN 2.7*  --  2.4*  --    ALKPHOS 146  --  139  --    ALT 17  --  17  --    AST 14  --  16  --    BILITOT 0.2  --  0.1  --      Microbiology Results (last 7 days)       ** No results found for the last 168 hours. **             See below for Radiology    Assessment/Plan:  COPD with acute exacerbation, POA  CT evidence of bilateral pneumonitis with nodular opacities and areas of subpleural consolidation - infectious vs inflammatory, POA  Acute hypoxic respiratory failure due to above, POA  Leukocytosis due to infection, POA  Thrombocytosis, likely reactive -POA  Mixed Metabolic  alkalosis and respiratory acidosis , POA - in the setting of diuretic therapy with HCTZ  Hypokalemia  due to HCTZ therapy, POA  Hyperglycemia due to steroid treatment  Elevated BNP - likely in the setting COPD exacerbation , no evidence of cardiac decompensation   Immunosuppressed status due to drug therapy  Current everyday smoker and tobacco dependency      Hx- HTN, seizure disorder, Crohn's disease, thrombocytosis    Plan-  Continue supp oxygen with goal Spo2 88 to 90%  Change Antibiotic to Levofloxacin to cover Pseudomonas infection.   Continue nebulized bronchodilators and ICS  Get Fungitell assay, TB gold test   Pt will need Pulmonology referral on discharge to follow up on CT chest and further input  Hold HCTZ  Correct and replete electrolytes as indicated   Home O2 eval prior to discharge   Will get an echocardiogram to evaluate PA pressure and ventricular function in the setting of COPD  Home meds are reviewed and resumed     Assistance with smoking cessation was offered, including:  []  Medications  [x]  Counseling  []  Printed Information on Smoking Cessation  []  Referral to a Smoking Cessation Program    Patient was counseled regarding smoking for >10 minutes.    VTE prophylaxis: Lovenox     Patient condition:  Fair    Anticipated discharge and Disposition:     Home with family     All diagnosis and differential diagnosis have been reviewed; assessment and plan has been documented; I have personally reviewed the labs and test results that are presently available; I have reviewed the patients medication list; I have reviewed the consulting providers response and recommendations. I have reviewed or attempted to review medical records based upon their availability    All of the patient's questions have been  addressed and answered. Patient's is agreeable to the above stated plan. I will continue to monitor closely and make adjustments to medical management as needed.    Portions of this note dictated using  EMR integrated voice recognition software, and may be subject to voice recognition errors not corrected at proofreading. Please contact writer for clarification if needed.   _____________________________________________________________________    Malnutrition Status:  Nutrition consulted. Most recent weight and BMI monitored-     Measurements:  Wt Readings from Last 1 Encounters:   11/27/24 49.9 kg (110 lb 0.2 oz)   Body mass index is 20.79 kg/m².    Patient has been screened and assessed by RD.    Malnutrition Type:  Context:    Level:      Malnutrition Characteristic Summary:       Interventions/Recommendations (treatment strategy):        Scheduled Med:   albuterol-ipratropium  3 mL Nebulization Q4H WAKE    azithromycin  250 mg Oral Daily    cefTRIAXone (Rocephin) IV (PEDS and ADULTS)  1 g Intravenous Q24H    dextromethorphan-guaiFENesin  mg/5 ml  10 mL Oral Q6H    enoxparin  40 mg Subcutaneous Q24H (prophylaxis, 1700)    fluticasone furoate-vilanteroL  1 puff Inhalation Daily    lisinopriL  20 mg Oral Daily    nicotine  1 patch Transdermal Daily    OXcarbazepine  1,200 mg Oral QHS    predniSONE  60 mg Oral Daily    senna-docusate 8.6-50 mg  2 tablet Oral BID      Continuous Infusions:     PRN Meds:    Current Facility-Administered Medications:     acetaminophen, 1,000 mg, Oral, Q6H PRN    influenza (adjuvanted), 0.5 mL, Intramuscular, Prior to discharge    ondansetron, 4 mg, Intravenous, Q8H PRN         Adama iEsenberg MD  Department of Hospital Medicine   [facility]   11/28/2024

## 2024-11-29 VITALS
BODY MASS INDEX: 20.77 KG/M2 | HEART RATE: 91 BPM | OXYGEN SATURATION: 92 % | HEIGHT: 61 IN | WEIGHT: 110 LBS | RESPIRATION RATE: 24 BRPM | TEMPERATURE: 98 F | DIASTOLIC BLOOD PRESSURE: 74 MMHG | SYSTOLIC BLOOD PRESSURE: 151 MMHG

## 2024-11-29 PROBLEM — J44.1 COPD EXACERBATION: Status: ACTIVE | Noted: 2022-08-17

## 2024-11-29 LAB
ANION GAP SERPL CALC-SCNC: 10 MEQ/L
APICAL FOUR CHAMBER EJECTION FRACTION: 59 %
APICAL TWO CHAMBER EJECTION FRACTION: 74 %
AV INDEX (PROSTH): 1.21
AV MEAN GRADIENT: 8 MMHG
AV PEAK GRADIENT: 16 MMHG
AV VALVE AREA BY VELOCITY RATIO: 1.6 CM²
AV VALVE AREA: 2.7 CM²
AV VELOCITY RATIO: 0.7
BASOPHILS # BLD AUTO: 0.03 X10(3)/MCL
BASOPHILS NFR BLD AUTO: 0.2 %
BSA FOR ECHO PROCEDURE: 1.47 M2
BUN SERPL-MCNC: 22.9 MG/DL (ref 9.8–20.1)
CALCIUM SERPL-MCNC: 8.5 MG/DL (ref 8.4–10.2)
CHLORIDE SERPL-SCNC: 100 MMOL/L (ref 98–107)
CO2 SERPL-SCNC: 32 MMOL/L (ref 23–31)
CREAT SERPL-MCNC: 0.64 MG/DL (ref 0.55–1.02)
CREAT/UREA NIT SERPL: 36
CV ECHO LV RWT: 0.55 CM
DOP CALC AO PEAK VEL: 2 M/S
DOP CALC AO VTI: 26.5 CM
DOP CALC LVOT AREA: 2.3 CM2
DOP CALC LVOT DIAMETER: 1.7 CM
DOP CALC LVOT PEAK VEL: 1.4 M/S
DOP CALC LVOT STROKE VOLUME: 72.8 CM3
DOP CALC MV VTI: 31.9 CM
DOP CALCLVOT PEAK VEL VTI: 32.1 CM
E WAVE DECELERATION TIME: 121 MSEC
E/A RATIO: 0.93
E/E' RATIO: 14.71 M/S
ECHO LV POSTERIOR WALL: 1.1 CM (ref 0.6–1.1)
EOSINOPHIL # BLD AUTO: 0.16 X10(3)/MCL (ref 0–0.9)
EOSINOPHIL NFR BLD AUTO: 1.2 %
ERYTHROCYTE [DISTWIDTH] IN BLOOD BY AUTOMATED COUNT: 14.2 % (ref 11.5–17)
EST. AVERAGE GLUCOSE BLD GHB EST-MCNC: 114 MG/DL
FRACTIONAL SHORTENING: 37.5 % (ref 28–44)
GFR SERPLBLD CREATININE-BSD FMLA CKD-EPI: >60 ML/MIN/1.73/M2
GLUCOSE SERPL-MCNC: 94 MG/DL (ref 82–115)
HBA1C MFR BLD: 5.6 %
HCT VFR BLD AUTO: 35.2 % (ref 37–47)
HGB BLD-MCNC: 11.6 G/DL (ref 12–16)
IMM GRANULOCYTES # BLD AUTO: 0.22 X10(3)/MCL (ref 0–0.04)
IMM GRANULOCYTES NFR BLD AUTO: 1.7 %
INTERVENTRICULAR SEPTUM: 1.1 CM (ref 0.6–1.1)
LEFT ATRIUM AREA SYSTOLIC (APICAL 2 CHAMBER): 10 CM2
LEFT ATRIUM AREA SYSTOLIC (APICAL 4 CHAMBER): 10.9 CM2
LEFT ATRIUM SIZE: 3 CM
LEFT ATRIUM VOLUME INDEX MOD: 16.1 ML/M2
LEFT ATRIUM VOLUME MOD: 23.6 ML
LEFT INTERNAL DIMENSION IN SYSTOLE: 2.5 CM (ref 2.1–4)
LEFT VENTRICLE DIASTOLIC VOLUME INDEX: 47.62 ML/M2
LEFT VENTRICLE DIASTOLIC VOLUME: 70 ML
LEFT VENTRICLE END DIASTOLIC VOLUME APICAL 2 CHAMBER: 29.7 ML
LEFT VENTRICLE END DIASTOLIC VOLUME APICAL 4 CHAMBER: 25.9 ML
LEFT VENTRICLE END SYSTOLIC VOLUME APICAL 2 CHAMBER: 21.6 ML
LEFT VENTRICLE END SYSTOLIC VOLUME APICAL 4 CHAMBER: 23.6 ML
LEFT VENTRICLE MASS INDEX: 99.1 G/M2
LEFT VENTRICLE SYSTOLIC VOLUME INDEX: 15.2 ML/M2
LEFT VENTRICLE SYSTOLIC VOLUME: 22.3 ML
LEFT VENTRICULAR INTERNAL DIMENSION IN DIASTOLE: 4 CM (ref 3.5–6)
LEFT VENTRICULAR MASS: 145.6 G
LV LATERAL E/E' RATIO: 15.63 M/S
LV SEPTAL E/E' RATIO: 13.89 M/S
LVED V (TEICH): 70 ML
LVES V (TEICH): 22.3 ML
LVOT MG: 4 MMHG
LVOT MV: 0.89 CM/S
LYMPHOCYTES # BLD AUTO: 1.48 X10(3)/MCL (ref 0.6–4.6)
LYMPHOCYTES NFR BLD AUTO: 11.5 %
MAGNESIUM SERPL-MCNC: 1.6 MG/DL (ref 1.6–2.6)
MCH RBC QN AUTO: 32.1 PG (ref 27–31)
MCHC RBC AUTO-ENTMCNC: 33 G/DL (ref 33–36)
MCV RBC AUTO: 97.5 FL (ref 80–94)
MONOCYTES # BLD AUTO: 0.76 X10(3)/MCL (ref 0.1–1.3)
MONOCYTES NFR BLD AUTO: 5.9 %
MV MEAN GRADIENT: 5 MMHG
MV PEAK A VEL: 1.35 M/S
MV PEAK E VEL: 1.25 M/S
MV PEAK GRADIENT: 11 MMHG
MV STENOSIS PRESSURE HALF TIME: 98 MS
MV VALVE AREA BY CONTINUITY EQUATION: 2.28 CM2
MV VALVE AREA P 1/2 METHOD: 2.24 CM2
NEUTROPHILS # BLD AUTO: 10.25 X10(3)/MCL (ref 2.1–9.2)
NEUTROPHILS NFR BLD AUTO: 79.5 %
NRBC BLD AUTO-RTO: 0 %
OHS LV EJECTION FRACTION SIMPSONS BIPLANE MOD: 68 %
PHOSPHATE SERPL-MCNC: 2.5 MG/DL (ref 2.3–4.7)
PISA TR MAX VEL: 2.02 M/S
PLATELET # BLD AUTO: 560 X10(3)/MCL (ref 130–400)
PMV BLD AUTO: 9.7 FL (ref 7.4–10.4)
POTASSIUM SERPL-SCNC: 3.7 MMOL/L (ref 3.5–5.1)
RA PRESSURE ESTIMATED: 3 MMHG
RBC # BLD AUTO: 3.61 X10(6)/MCL (ref 4.2–5.4)
RIGHT VENTRICULAR END-DIASTOLIC DIMENSION: 2.6 CM
RV TB RVSP: 5 MMHG
SODIUM SERPL-SCNC: 142 MMOL/L (ref 136–145)
TDI LATERAL: 0.08 M/S
TDI SEPTAL: 0.09 M/S
TDI: 0.09 M/S
TR MAX PG: 16 MMHG
TRICUSPID ANNULAR PLANE SYSTOLIC EXCURSION: 3.07 CM
TV REST PULMONARY ARTERY PRESSURE: 19 MMHG
WBC # BLD AUTO: 12.9 X10(3)/MCL (ref 4.5–11.5)
Z-SCORE OF LEFT VENTRICULAR DIMENSION IN END DIASTOLE: -0.96
Z-SCORE OF LEFT VENTRICULAR DIMENSION IN END SYSTOLE: -0.71

## 2024-11-29 PROCEDURE — 80048 BASIC METABOLIC PNL TOTAL CA: CPT | Performed by: INTERNAL MEDICINE

## 2024-11-29 PROCEDURE — 25000242 PHARM REV CODE 250 ALT 637 W/ HCPCS

## 2024-11-29 PROCEDURE — 83735 ASSAY OF MAGNESIUM: CPT | Performed by: INTERNAL MEDICINE

## 2024-11-29 PROCEDURE — 36415 COLL VENOUS BLD VENIPUNCTURE: CPT | Performed by: INTERNAL MEDICINE

## 2024-11-29 PROCEDURE — 25000003 PHARM REV CODE 250

## 2024-11-29 PROCEDURE — S4991 NICOTINE PATCH NONLEGEND: HCPCS

## 2024-11-29 PROCEDURE — 25000003 PHARM REV CODE 250: Performed by: INTERNAL MEDICINE

## 2024-11-29 PROCEDURE — 84100 ASSAY OF PHOSPHORUS: CPT | Performed by: INTERNAL MEDICINE

## 2024-11-29 PROCEDURE — 83036 HEMOGLOBIN GLYCOSYLATED A1C: CPT | Performed by: INTERNAL MEDICINE

## 2024-11-29 PROCEDURE — 85025 COMPLETE CBC W/AUTO DIFF WBC: CPT | Performed by: INTERNAL MEDICINE

## 2024-11-29 PROCEDURE — 87070 CULTURE OTHR SPECIMN AEROBIC: CPT | Performed by: INTERNAL MEDICINE

## 2024-11-29 PROCEDURE — 63600175 PHARM REV CODE 636 W HCPCS: Performed by: INTERNAL MEDICINE

## 2024-11-29 PROCEDURE — 25000242 PHARM REV CODE 250 ALT 637 W/ HCPCS: Performed by: INTERNAL MEDICINE

## 2024-11-29 RX ORDER — LEVOFLOXACIN 750 MG/1
750 TABLET ORAL DAILY
Qty: 5 TABLET | Refills: 0 | Status: SHIPPED | OUTPATIENT
Start: 2024-11-29 | End: 2024-12-05

## 2024-11-29 RX ORDER — PREDNISONE 20 MG/1
40 TABLET ORAL DAILY
Qty: 10 TABLET | Refills: 0 | Status: SHIPPED | OUTPATIENT
Start: 2024-11-30 | End: 2024-12-05

## 2024-11-29 RX ORDER — LANOLIN ALCOHOL/MO/W.PET/CERES
800 CREAM (GRAM) TOPICAL ONCE
Status: COMPLETED | OUTPATIENT
Start: 2024-11-29 | End: 2024-11-29

## 2024-11-29 RX ORDER — LISINOPRIL 20 MG/1
20 TABLET ORAL DAILY
Qty: 30 TABLET | Refills: 0 | Status: SHIPPED | OUTPATIENT
Start: 2024-11-30 | End: 2024-12-30

## 2024-11-29 RX ORDER — GUAIFENESIN AND DEXTROMETHORPHAN HYDROBROMIDE 10; 100 MG/5ML; MG/5ML
10 SYRUP ORAL EVERY 6 HOURS
COMMUNITY
Start: 2024-11-29 | End: 2024-12-10

## 2024-11-29 RX ORDER — IBUPROFEN 200 MG
1 TABLET ORAL DAILY
COMMUNITY
Start: 2024-11-30

## 2024-11-29 RX ORDER — IBUPROFEN 200 MG
1 TABLET ORAL DAILY
COMMUNITY
Start: 2024-11-29 | End: 2024-12-05

## 2024-11-29 RX ADMIN — Medication 800 MG: at 09:11

## 2024-11-29 RX ADMIN — LISINOPRIL 20 MG: 20 TABLET ORAL at 09:11

## 2024-11-29 RX ADMIN — IPRATROPIUM BROMIDE AND ALBUTEROL SULFATE 3 ML: 2.5; .5 SOLUTION RESPIRATORY (INHALATION) at 08:11

## 2024-11-29 RX ADMIN — NICOTINE 1 PATCH: 14 PATCH TRANSDERMAL at 09:11

## 2024-11-29 RX ADMIN — BUDESONIDE 0.5 MG: 0.5 INHALANT RESPIRATORY (INHALATION) at 08:11

## 2024-11-29 RX ADMIN — GUAIFENESIN AND DEXTROMETHORPHAN 10 ML: 100; 10 SYRUP ORAL at 12:11

## 2024-11-29 RX ADMIN — GUAIFENESIN AND DEXTROMETHORPHAN 10 ML: 100; 10 SYRUP ORAL at 06:11

## 2024-11-29 RX ADMIN — PREDNISONE 50 MG: 20 TABLET ORAL at 09:11

## 2024-11-29 NOTE — PLAN OF CARE
11/29/24 0932   Discharge Assessment   Assessment Type Discharge Planning Assessment   Confirmed/corrected address, phone number and insurance Yes   Confirmed Demographics Correct on Facesheet   Source of Information patient   When was your last doctors appointment? 07/15/24   Communicated RUPA with patient/caregiver Yes   Reason For Admission sob, headache   People in Home alone   Do you expect to return to your current living situation? Yes   Do you have help at home or someone to help you manage your care at home? No   Prior to hospitilization cognitive status: Alert/Oriented   Current cognitive status: Alert/Oriented   Walking or Climbing Stairs Difficulty no   Dressing/Bathing Difficulty no   Home Layout Able to live on 1st floor   Equipment Currently Used at Home nebulizer   Readmission within 30 days? No   Patient currently being followed by outpatient case management? No   Do you currently have service(s) that help you manage your care at home? No   Do you take prescription medications? Yes   Do you have any problems affording any of your prescribed medications? No   Is the patient taking medications as prescribed? yes   Who is going to help you get home at discharge? sister   How do you get to doctors appointments? car, drives self   Are you on dialysis? No   Do you take coumadin? No   Discharge Plan A Home   Discharge Plan B Home   DME Needed Upon Discharge  none   Discharge Plan discussed with: Patient;Sibling   Name(s) and Number(s) sister   Transition of Care Barriers None     Pharmacy: [facility];   Pt works at [employer]  Pt states she is indep in adl's. Pt denies any dc needs. Pt does not qualify for home O2.

## 2024-11-29 NOTE — PLAN OF CARE
Problem: Adult Inpatient Plan of Care  Goal: Plan of Care Review  Outcome: Progressing  Goal: Patient-Specific Goal (Individualized)  Outcome: Progressing  Goal: Absence of Hospital-Acquired Illness or Injury  Outcome: Progressing  Goal: Optimal Comfort and Wellbeing  Outcome: Progressing  Goal: Readiness for Transition of Care  Outcome: Progressing     Problem: Fall Injury Risk  Goal: Absence of Fall and Fall-Related Injury  Outcome: Progressing     Problem: Airway Clearance Ineffective  Goal: Effective Airway Clearance  Outcome: Progressing     Problem: COPD (Chronic Obstructive Pulmonary Disease)  Goal: Optimal Level of Functional [place]  Outcome: Progressing  Goal: Absence of Infection Signs and Symptoms  Outcome: Progressing  Goal: Improved Oral Intake  Outcome: Progressing  Goal: Effective Oxygenation and Ventilation  Outcome: Progressing

## 2024-11-29 NOTE — PLAN OF CARE
Problem: Adult Inpatient Plan of Care  Goal: Plan of Care Review  Outcome: Met  Goal: Patient-Specific Goal (Individualized)  Outcome: Met  Goal: Absence of Hospital-Acquired Illness or Injury  Outcome: Met  Goal: Optimal Comfort and Wellbeing  Outcome: Met  Goal: Readiness for Transition of Care  Outcome: Met     Problem: Fall Injury Risk  Goal: Absence of Fall and Fall-Related Injury  Outcome: Met     Problem: Airway Clearance Ineffective  Goal: Effective Airway Clearance  Outcome: Met     Problem: COPD (Chronic Obstructive Pulmonary Disease)  Goal: Optimal Level of Functional [place]  Outcome: Met  Goal: Absence of Infection Signs and Symptoms  Outcome: Met  Goal: Improved Oral Intake  Outcome: Met  Goal: Effective Oxygenation and Ventilation  Outcome: Met

## 2024-12-01 LAB
BACTERIA SPEC CULT: ABNORMAL
GRAM STN SPEC: ABNORMAL

## 2024-12-02 ENCOUNTER — TELEPHONE (OUTPATIENT)
Dept: ENDOCRINOLOGY | Facility: CLINIC | Age: 64
End: 2024-12-02
Payer: COMMERCIAL

## 2024-12-02 ENCOUNTER — TELEPHONE (OUTPATIENT)
Dept: FAMILY MEDICINE | Facility: CLINIC | Age: 64
End: 2024-12-02
Payer: COMMERCIAL

## 2024-12-02 LAB
1,3 BETA GLUCAN SER QL: NEGATIVE
1,3 BETA GLUCAN SER-MCNC: 42 PG/ML
GAMMA INTERFERON BACKGROUND BLD IA-ACNC: 0.01 IU/ML
M TB IFN-G BLD-IMP: ABNORMAL
M TB IFN-G CD4+ BCKGRND COR BLD-ACNC: 0 IU/ML
M TB IFN-G CD4+CD8+ BCKGRND COR BLD-ACNC: 0 IU/ML
MITOGEN IGNF BCKGRD COR BLD-ACNC: 0.1 IU/ML

## 2024-12-02 NOTE — TELEPHONE ENCOUNTER
Received therapy plan to continue ZA for osteoporosis.  Pt has not been seen since 2022.  Offer pt f/u this Thursday 12/5/24 (we can go to 9) to f/u on her d/s and sort next steps.

## 2024-12-02 NOTE — TELEPHONE ENCOUNTER
----- Message from Diamond sent at 12/2/2024  8:40 AM CST -----  Who Called: Judy Vides    Caller is requesting a sooner appointment. Caller declined first available appointment listed below. Caller will not accept being placed on the waitlist and is requesting a message be sent to doctor.    When is the first available appointment? Jan29  Options offered (Virtual Visit, Urgent Care): office  Symptoms: hospital f/u      Preferred Method of Contact: Phone Call  Patient's Preferred Phone Number on File: [phone]   Best Call Back Number, if different:  Additional Information:  follow up & need to be ref over to an  lung specialist , hospital doctor has check pt blood pressure med to lisinopriL (PRINIVIL,ZESTRIL) 20 MG tablet

## 2024-12-02 NOTE — TELEPHONE ENCOUNTER
Med list reflects updated med change to lisinopril 20 mg by Dr. Eisenbegr    Please reach out to patient to schedule appt for next available with Dr. Mcqueen or bailey

## 2024-12-03 ENCOUNTER — PATIENT OUTREACH (OUTPATIENT)
Dept: ADMINISTRATIVE | Facility: CLINIC | Age: 64
End: 2024-12-03
Payer: COMMERCIAL

## 2024-12-03 NOTE — PROGRESS NOTES
"C3 nurse spoke with Judy Vides for a TCC post hospital discharge follow up call. The patient does not have a scheduled HOSFU appointment with Anita Mcqueen MD within 5-7 days post hospital discharge date 11/29/24. Message sent to PCP staff requesting they contact patient and schedule follow up appointment.    The patient does have a follow up with Endocrinology on 12/10/24, and was referred to Dr. Salas Ruiz (pulmonology) but has not been contacted to schedule a new patient appointment. C3 nurse provided the number and information for Dr. Ruiz's office and she will call to check on her appointment status.     She did note that she is having a hard time with smoking cessation and is scheduled to start the smoking cessation program in January.     She also noted that her O2 Sat this AM was 94%, but she has not been checking her BP or HR since the medication change. She will begin to monitor these as well, as she reported having "the shakes" after her breathing treatments.   "

## 2024-12-04 DIAGNOSIS — J44.9 CHRONIC OBSTRUCTIVE PULMONARY DISEASE, UNSPECIFIED COPD TYPE: Chronic | ICD-10-CM

## 2024-12-04 RX ORDER — ALBUTEROL SULFATE 0.83 MG/ML
SOLUTION RESPIRATORY (INHALATION)
Qty: 150 ML | Refills: 11 | Status: SHIPPED | OUTPATIENT
Start: 2024-12-04

## 2024-12-05 ENCOUNTER — TELEPHONE (OUTPATIENT)
Dept: FAMILY MEDICINE | Facility: CLINIC | Age: 64
End: 2024-12-05

## 2024-12-05 ENCOUNTER — TELEPHONE (OUTPATIENT)
Dept: FAMILY MEDICINE | Facility: CLINIC | Age: 64
End: 2024-12-05
Payer: COMMERCIAL

## 2024-12-05 ENCOUNTER — OFFICE VISIT (OUTPATIENT)
Dept: FAMILY MEDICINE | Facility: CLINIC | Age: 64
End: 2024-12-05
Payer: COMMERCIAL

## 2024-12-05 VITALS
TEMPERATURE: 98 F | HEIGHT: 61 IN | SYSTOLIC BLOOD PRESSURE: 128 MMHG | DIASTOLIC BLOOD PRESSURE: 72 MMHG | BODY MASS INDEX: 20.77 KG/M2 | HEART RATE: 90 BPM | WEIGHT: 110 LBS | OXYGEN SATURATION: 94 % | RESPIRATION RATE: 16 BRPM

## 2024-12-05 DIAGNOSIS — Z72.0 TOBACCO USER: Chronic | ICD-10-CM

## 2024-12-05 DIAGNOSIS — Z09 HOSPITAL DISCHARGE FOLLOW-UP: Primary | ICD-10-CM

## 2024-12-05 DIAGNOSIS — F41.1 GENERALIZED ANXIETY DISORDER: Chronic | ICD-10-CM

## 2024-12-05 DIAGNOSIS — K50.919 CROHN'S DISEASE WITH COMPLICATION, UNSPECIFIED GASTROINTESTINAL TRACT LOCATION: ICD-10-CM

## 2024-12-05 DIAGNOSIS — J44.1 COPD EXACERBATION: ICD-10-CM

## 2024-12-05 DIAGNOSIS — I10 PRIMARY HYPERTENSION: Chronic | ICD-10-CM

## 2024-12-05 DIAGNOSIS — G40.309 NONINTRACTABLE GENERALIZED IDIOPATHIC EPILEPSY WITHOUT STATUS EPILEPTICUS: ICD-10-CM

## 2024-12-05 RX ORDER — SODIUM, POTASSIUM,MAG SULFATES 17.5-3.13G
SOLUTION, RECONSTITUTED, ORAL ORAL
COMMUNITY
Start: 2024-11-13 | End: 2024-12-05

## 2024-12-05 RX ORDER — BUSPIRONE HYDROCHLORIDE 5 MG/1
5 TABLET ORAL 3 TIMES DAILY
COMMUNITY

## 2024-12-05 NOTE — PROGRESS NOTES
"Subjective:        Patient ID: Judy Vides is a 64 y.o. female.    Chief Complaint: Follow-up (Hospital follow up )      Patient presents to clinic unaccompanied for hospital discharge follow up.  Lov with me 8/24/23. Lov 8/28/24 with CANDACE smith. Previously saw dr. Phillips.  She is due for a wellness in august.     She was inpatient 11/27/24-11/29/24.  Her discharge summary is not completed in the chart.     Progress note 11/28/24 " presented to ED with complaints of headache and shortness of breath. Patient states she thinks headaches are due to decreased oxygen. Took O2 at home this am and was in low 80s. Thinks it has been low for a week. States has also been coughing up greenish sputum for the past week. Feels she had fever then but did not take temp. Thought she had a sinus infection and attempted to take tylenol and cough syrup with no relief. Has been using her neb treatments more frequently in this time with minimal help. Reports compliance with inhalers. Still continues to smoke. States headache has resolved. Denies chest pain, nausea, vomiting, diarrhea, dysuria, dizziness, blurry vision, sick contacts.      In ED, afebrile with O2 87% on RA. Labs signficant for elevated WBC 22, platelet 635, K 3.0, chloride 92, bicarb 37. CXR with trace pleural thickening with pulmonary hyperinflation with chronic changes. No prior imaging to compare. Admitted to  service for COPD exacerbation, acute hypoxic resp failure and severe leukocytosis. CT chest done on 11/27 showed scattered patchy nodular opacities in both lungs with few scattered small areas of subpleural consolidation likely infectious or inflammatory with chronic indolent infection possible per radiology read. Pt was started on supplemental oxygen, Antibiotic Azithromycin /Ceftriaxone, systemic steroid, aggressive nebulized bronchodilators and ICS. Antibiotic change to respiratory flouroquinolone Levofloxacin to cover pseudomonas.       PLAN: Continue " "supp oxygen with goal Spo2 88 to 90%  Change Antibiotic to Levofloxacin to cover Pseudomonas infection.   Continue nebulized bronchodilators and ICS  Get Fungitell assay, TB gold test   Pt will need Pulmonology referral on discharge to follow up on CT chest and further input  Hold HCTZ  Correct and replete electrolytes as indicated   Home O2 eval prior to discharge   Will get an echocardiogram to evaluate PA pressure and ventricular function in the setting of COPD  Home meds are reviewed and resumed"      She is here today for follow up. Went to [facility] 11/24/24. Advised to go to ER for hypoxia.  She declined.  Went to ER on 11/27/24 and was admitted through 11/29/24.   Her TB gold test was indeterminate.  Contacted her job for [employer] paperwork.  They will be faxing it to our office.  Last day of work was 11/23/24. She has not been back to work since.  She works at [employer].  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  She completed antibiotics (levaquin) and steroids- both for 5 days. Completed yesterday. Stopped hctz.  Now just doing lisinopril 20mg. Bp in clinic today is wnl.  Feeling good.  She did not qualify for home O2 before hospital discharge.   Will be seeing aleksandr Robles,  but not until 3/2025.    Has been checking o2 at home 93-94% RA.   She is using breztri, albuterol neb q4 hours.  Has proair but only uses when she is at work. No fever.  Some coughing. Not productive. cough is mild. Would like to return to work on 12/30/24.   Has not had pfts. Will order.   She is still smoking.  Doing about 1/2 pack/ day.    Sister lives near and checks on her.  She did not have HH.  Does not feel she needs.                      She has copd.  Previously she saw dr. Hopkins. States he would not continue to see her due to not wanting to quit smoking.   Using breztri, albuterol inhaler, and albuterol nebs.  Symbicort is not too expensive and need find alternate. She is using nicotine patches " but having to take them off after 7 hours to smoke.      She has anxiety and is on buspar. Has not been taking lately. Her mother is 92 and lives with her and has dementia.       She has htn. Reports compliance with her meds     She has crohns and follows with GI NP Wanda Cedeño. She is on humira.  Doing well. Now seeing new person.  She has low b12 and does injections once weekly.  Her sister gives it to her.      She has seizures and follows with neuro NP Angie Falk.  Is on oxcarbazepine.  No seizures in years.       She is postmenopausal and has osteoporosis. Last dexa 12/2022 showed osteoporosis. She has low vitamin d and supplements otc.  She is on reclast, calcium and vit d per dr. Xiao office at [facility]. He orders her dexa.  Last mmg 12/2022.       She is smoker.  Started smoking at age 13.  1 pack every 2 days. She has not had lung cancer screening but would like to.  Colonoscopy 1/2020 with dr. Jimenez.                 Transitional Care Note    Family and/or Caretaker present at visit?  No.  Diagnostic tests reviewed/disposition: I have reviewed all completed as well as pending diagnostic tests at the time of discharge.  Disease/illness education: discussed  Home health/community services discussion/referrals: Patient does not have home health established from hospital visit.  They do not need home health.  If needed, we will set up home health for the patient.   Establishment or re-establishment of referral orders for community resources: No other necessary community resources.   Discussion with other health care providers: No discussion with other health care providers necessary.              Review of Systems   Constitutional: Negative.  Negative for fever.   HENT: Negative.     Respiratory:  Positive for cough. Negative for shortness of breath, wheezing and stridor.    Cardiovascular: Negative.    Gastrointestinal: Negative.    Genitourinary: Negative.          Review of patient's allergies indicates:  No  "Known Allergies   Vitals:    12/05/24 1312   BP: 128/72   BP Location: Left arm   Patient Position: Sitting   Pulse: 90   Resp: 16   Temp: 97.7 °F (36.5 °C)   TempSrc: Temporal   SpO2: (!) 94%   Weight: 49.9 kg (110 lb)   Height: 5' 1" (1.549 m)      Social History     Socioeconomic History    Marital status: Single    Number of children: 0   Occupational History    Occupation: [employer] Employee    Tobacco Use    Smoking status: Every Day     Current packs/day: 1.00     Average packs/day: 0.5 packs/day for 50.9 years (25.9 ttl pk-yrs)     Types: Cigarettes     Start date: 2024    Smokeless tobacco: Never   Substance and Sexual Activity    Alcohol use: Not Currently    Drug use: Not Currently    Sexual activity: Not Currently     Social Drivers of Health     Financial Resource Strain: Low Risk  (11/27/2024)    Overall Financial Resource Strain (CARDIA)     Difficulty of Paying Living Expenses: Not hard at all   Food Insecurity: No Food Insecurity (11/27/2024)    Hunger Vital Sign     Worried About Running Out of Food in the Last Year: Never true     Ran Out of Food in the Last Year: Never true   Transportation Needs: No Transportation Needs (11/27/2024)    TRANSPORTATION NEEDS     Transportation : No   Stress: No Stress Concern Present (11/27/2024)    [employer] - Occupational Stress Questionnaire     Feeling of Stress : Not at all   Housing Stability: Low Risk  (11/27/2024)    Housing Stability Vital Sign     Unable to Pay for Housing in the Last Year: No     Homeless in the Last Year: No      Family History   Problem Relation Name Age of Onset    Heart disease Mother      Emphysema Father            Objective:     Physical Exam  Vitals and nursing note reviewed.   Constitutional:       Appearance: Normal appearance. She is normal weight.   HENT:      Head: Normocephalic and atraumatic.      Nose: Nose normal.      Mouth/Throat:      Mouth: Mucous membranes are moist.      Pharynx: " Oropharynx is clear.   Eyes:      Extraocular Movements: Extraocular movements intact.   Cardiovascular:      Rate and Rhythm: Normal rate and regular rhythm.      Pulses: Normal pulses.      Heart sounds: Normal heart sounds.   Pulmonary:      Effort: Pulmonary effort is normal. No respiratory distress.      Breath sounds: Normal breath sounds.   Musculoskeletal:         General: Normal range of motion.      Cervical back: Normal range of motion.   Skin:     General: Skin is warm and dry.   Neurological:      General: No focal deficit present.      Mental Status: She is alert and oriented to person, place, and time. Mental status is at baseline.   Psychiatric:         Mood and Affect: Mood normal.       Current Outpatient Medications on File Prior to Visit   Medication Sig Dispense Refill    albuterol (PROVENTIL) 2.5 mg /3 mL (0.083 %) nebulizer solution USE 1 VIAL IN NEBULIZER 4 TIMES DAILY AS NEEDED FOR WHEEZING *RESCUE 150 mL 11    albuterol (PROVENTIL/VENTOLIN HFA) 90 mcg/actuation inhaler Inhale 2 puffs into the lungs every 6 (six) hours as needed for Wheezing. Rescue 18 g 11    budesonide-glycopyr-formoterol (BREZTRI AEROSPHERE) 160-9-4.8 mcg/actuation HFAA Inhale 2 puffs by mouth twice daily 11 g 6    calcium carbonate (OS-NASRIN) 600 mg calcium (1,500 mg) Tab Take 1,200 mg by mouth every evening.      cyanocobalamin 1,000 mcg/mL injection Inject 1,000 mcg into the muscle every 7 days.      dextromethorphan-guaiFENesin  mg/5 ml (ROBITUSSIN-DM)  mg/5 mL liquid Take 10 mLs by mouth every 6 (six) hours. for 10 days      ergocalciferol (ERGOCALCIFEROL) 50,000 unit Cap Take 50,000 Units by mouth every 7 days.      lisinopriL (PRINIVIL,ZESTRIL) 20 MG tablet Take 1 tablet (20 mg total) by mouth once daily. 30 tablet 0    nebulizer accessories (NEBULIZER MISC) 1 each by Misc.(Non-Drug; Combo Route) route daily as needed. RAJNI: 99 Dispense with preferred suppies      nebulizers Misc 1 each by Misc.(Non-Drug;  Combo Route) route daily as needed (PRN COPD). RAJNI: 99 Dispense with preferred supplies 1 each 0    OXcarbazepine (TRILEPTAL) 600 MG Tab Take 2 tablets (1,200 mg total) by mouth every evening. 180 tablet 4    zoledronic acid-mannitol & water (RECLAST) 5 mg/100 mL PgBk Inject 5 mg into the vein once. IVPB once yearly      [DISCONTINUED] busPIRone (BUSPAR) 5 MG Tab Take 1 tablet (5 mg total) by mouth 3 (three) times daily as needed (anxiety). 90 tablet 4    [DISCONTINUED] nicotine (NICODERM CQ) 14 mg/24 hr Place 1 patch onto the skin once daily.      busPIRone (BUSPAR) 5 MG Tab Take 5 mg by mouth 3 (three) times daily. prn      HUMIRA,CF, PEN 40 mg/0.4 mL PnKt SMARTSI Milligram(s) SUB-Q Every 2 Weeks (Patient not taking: Reported on 2024)      hypromellose (SYSTANE GEL OPHT) Apply 1 drop to eye as needed (dry eyes).      nicotine (NICODERM CQ) 14 mg/24 hr Place 1 patch onto the skin once daily. (Patient not taking: Reported on 2024)      predniSONE (DELTASONE) 20 MG tablet Take 2 tablets (40 mg total) by mouth once daily. for 5 days (Patient not taking: Reported on 2024) 10 tablet 0    [DISCONTINUED] levoFLOXacin (LEVAQUIN) 750 MG tablet Take 1 tablet (750 mg total) by mouth once daily. for 5 days 5 tablet 0    [DISCONTINUED] sodium,potassium,mag sulfates (SUPREP BOWEL PREP KIT) 17.5-3.13-1.6 gram SolR Take by mouth. (Patient not taking: Reported on 2024)       No current facility-administered medications on file prior to visit.     Health Maintenance   Topic Date Due    Influenza Vaccine (1) 2024    COVID-19 Vaccine ( season) 2024    RSV Vaccine (Age 60+ and Pregnant patients) (1 - Risk 60-74 years 1-dose series) 2024 (Originally 2018)    TETANUS VACCINE  2025 (Originally 1976)    Shingles Vaccine (1 of 2) 2025 (Originally 2008)    Mammogram  2025    LDCT Lung Screen  2025    DEXA Scan  12/15/2025    Lipid Panel  2029     Colorectal Cancer Screening  01/07/2030    Hepatitis C Screening  Completed    Pneumococcal Vaccines (Age 65+)  Completed      Results for orders placed or performed during the hospital encounter of 11/27/24   EKG 12-lead    Collection Time: 11/27/24  7:42 AM   Result Value Ref Range    QRS Duration 78 ms    [facility] QTC Calculation 440 ms   Comprehensive metabolic panel    Collection Time: 11/27/24  8:21 AM   Result Value Ref Range    Sodium 141 136 - 145 mmol/L    Potassium 3.0 (L) 3.5 - 5.1 mmol/L    Chloride 92 (L) 98 - 107 mmol/L    CO2 37 (H) 23 - 31 mmol/L    Glucose 127 (H) 82 - 115 mg/dL    Blood Urea Nitrogen 20.1 9.8 - 20.1 mg/dL    Creatinine 0.84 0.55 - 1.02 mg/dL    Calcium 9.4 8.4 - 10.2 mg/dL    Protein Total 6.1 5.8 - 7.6 gm/dL    Albumin 2.7 (L) 3.4 - 4.8 g/dL    Globulin 3.4 2.4 - 3.5 gm/dL    Albumin/Globulin Ratio 0.8 (L) 1.1 - 2.0 ratio    Bilirubin Total 0.2 <=1.5 mg/dL     40 - 150 unit/L    ALT 17 0 - 55 unit/L    AST 14 5 - 34 unit/L    eGFR >60 mL/min/1.73/m2    Anion Gap 12.0 mEq/L    BUN/Creatinine Ratio 24    Troponin I    Collection Time: 11/27/24  8:21 AM   Result Value Ref Range    Troponin-I 0.024 0.000 - 0.045 ng/mL   CBC with Differential    Collection Time: 11/27/24  8:21 AM   Result Value Ref Range    WBC 22.26 (H) 4.50 - 11.50 x10(3)/mcL    RBC 4.22 4.20 - 5.40 x10(6)/mcL    Hgb 14.0 12.0 - 16.0 g/dL    Hct 39.7 37.0 - 47.0 %    MCV 94.1 (H) 80.0 - 94.0 fL    MCH 33.2 (H) 27.0 - 31.0 pg    MCHC 35.3 33.0 - 36.0 g/dL    RDW 13.7 11.5 - 17.0 %    Platelet 635 (H) 130 - 400 x10(3)/mcL    MPV 9.8 7.4 - 10.4 fL    NRBC% 0.0 %   Manual Differential    Collection Time: 11/27/24  8:21 AM   Result Value Ref Range    WBC 22.26 x10(3)/mcL    Neutrophils % 90 %    Lymphs % 5 %    Monocytes % 4 %    Neutrophils Abs 20.034 (H) 2.1 - 9.2 x10(3)/mcL    Lymphs Abs 1.113 0.6 - 4.6 x10(3)/mcL    Monocytes Abs 0.8904 0.1 - 1.3 x10(3)/mcL    Platelets Increased (A) Normal, Adequate    RBC Morph  Abnormal (A) Normal    Anisocytosis 1+ (A) (none)    Macrocytosis 1+ (A) (none)   Magnesium    Collection Time: 11/27/24  8:21 AM   Result Value Ref Range    Magnesium Level 1.90 1.60 - 2.60 mg/dL   RT Blood Gas    Collection Time: 11/27/24  8:27 AM   Result Value Ref Range    Sample Type Arterial Blood     Sample site Left Brachial Artery     Drawn by sd rrt     pH, Blood gas 7.520 (H) 7.350 - 7.450    pCO2, Blood gas 50.0 (H) 35.0 - 45.0 mmHg    pO2, Blood gas 62.0 (L) 80.0 - 100.0 mmHg    Sodium, Blood Gas 130 (L) 137 - 145 mmol/L    Potassium, Blood Gas 2.2 (LL) 3.5 - 5.0 mmol/L    Calcium Level Ionized 1.07 (L) 1.12 - 1.23 mmol/L    TOC2, Blood gas 42.3 mmol/L    Base Excess, Blood gas 15.50 (H) -2.00 - 2.00 mmol/L    sO2, Blood gas 93.7 %    HCO3, Blood gas 40.8 (H) 22.0 - 26.0 mmol/L    THb, Blood gas 14.0 12 - 16 g/dL    O2 Hb, Blood Gas 86.7 (L) 94.0 - 97.0 %    CO Hgb 9.2 (HH) 0.5 - 1.5 %    Met Hgb 1.0 0.4 - 1.5 %    Allens Test Yes     Oxygen Device, Blood gas Cannula     LPM 2    Brain natriuretic peptide    Collection Time: 11/27/24  8:36 AM   Result Value Ref Range    Natriuretic Peptide 129.2 (H) <=100.0 pg/mL   COVID/RSV/FLU A&B PCR    Collection Time: 11/27/24  9:50 AM   Result Value Ref Range    Influenza A PCR Not Detected Not Detected    Influenza B PCR Not Detected Not Detected    Respiratory Syncytial Virus PCR Not Detected Not Detected    SARS-CoV-2 PCR Not Detected Not Detected, Negative   Respiratory Panel    Collection Time: 11/27/24  9:50 AM   Result Value Ref Range    Adenovirus Not Detected Not Detected    Coronavirus 229E Not Detected Not Detected    Coronavirus HKU1 Not Detected Not Detected    Coronavirus NL63 Not Detected Not Detected    Coronavirus OC43 PCR, Common Cold Virus Not Detected Not Detected    Human Metapneumovirus Not Detected Not Detected    Parainfluenza Virus 1 Not Detected Not Detected    Parainfluenza Virus 2 Not Detected Not Detected    Parainfluenza Virus 3 Not  Detected Not Detected    Parainfluenza Virus 4 Not Detected Not Detected    Bordetella pertussis (ptxP) Not Detected Not Detected    Chlamydia pneumoniae Not Detected Not Detected    Mycoplasma pneumoniae Not Detected Not Detected    Human Rhinovirus/Enterovirus Not Detected Not Detected    Bordetella parapertussis (IU6079) Not Detected Not Detected   Comprehensive Metabolic Panel    Collection Time: 11/28/24  3:20 AM   Result Value Ref Range    Sodium 141 136 - 145 mmol/L    Potassium 2.7 (LL) 3.5 - 5.1 mmol/L    Chloride 93 (L) 98 - 107 mmol/L    CO2 35 (H) 23 - 31 mmol/L    Glucose 148 (H) 82 - 115 mg/dL    Blood Urea Nitrogen 24.3 (H) 9.8 - 20.1 mg/dL    Creatinine 0.68 0.55 - 1.02 mg/dL    Calcium 9.1 8.4 - 10.2 mg/dL    Protein Total 6.0 5.8 - 7.6 gm/dL    Albumin 2.4 (L) 3.4 - 4.8 g/dL    Globulin 3.6 (H) 2.4 - 3.5 gm/dL    Albumin/Globulin Ratio 0.7 (L) 1.1 - 2.0 ratio    Bilirubin Total 0.1 <=1.5 mg/dL     40 - 150 unit/L    ALT 17 0 - 55 unit/L    AST 16 5 - 34 unit/L    eGFR >60 mL/min/1.73/m2    Anion Gap 13.0 mEq/L    BUN/Creatinine Ratio 36    CBC with Differential    Collection Time: 11/28/24  3:20 AM   Result Value Ref Range    WBC 18.02 (H) 4.50 - 11.50 x10(3)/mcL    RBC 3.83 (L) 4.20 - 5.40 x10(6)/mcL    Hgb 12.6 12.0 - 16.0 g/dL    Hct 36.8 (L) 37.0 - 47.0 %    MCV 96.1 (H) 80.0 - 94.0 fL    MCH 32.9 (H) 27.0 - 31.0 pg    MCHC 34.2 33.0 - 36.0 g/dL    RDW 13.9 11.5 - 17.0 %    Platelet 574 (H) 130 - 400 x10(3)/mcL    MPV 9.9 7.4 - 10.4 fL    Neut % 93.9 %    Lymph % 3.2 %    Mono % 1.8 %    Eos % 0.0 %    Basophil % 0.2 %    Lymph # 0.57 (L) 0.6 - 4.6 x10(3)/mcL    Neut # 16.93 (H) 2.1 - 9.2 x10(3)/mcL    Mono # 0.32 0.1 - 1.3 x10(3)/mcL    Eos # 0.00 0 - 0.9 x10(3)/mcL    Baso # 0.03 <=0.2 x10(3)/mcL    IG# 0.17 (H) 0 - 0.04 x10(3)/mcL    IG% 0.9 %    NRBC% 0.0 %   Basic Metabolic Panel    Collection Time: 11/28/24 11:48 AM   Result Value Ref Range    Sodium 143 136 - 145 mmol/L     Potassium 3.5 3.5 - 5.1 mmol/L    Chloride 97 (L) 98 - 107 mmol/L    CO2 35 (H) 23 - 31 mmol/L    Glucose 150 (H) 82 - 115 mg/dL    Blood Urea Nitrogen 25.9 (H) 9.8 - 20.1 mg/dL    Creatinine 0.72 0.55 - 1.02 mg/dL    BUN/Creatinine Ratio 36     Calcium 9.0 8.4 - 10.2 mg/dL    Anion Gap 11.0 mEq/L    eGFR >60 mL/min/1.73/m2   Fungitell Assay For (1.3)-B-D-Glucans    Collection Time: 11/28/24  3:47 PM   Result Value Ref Range    Fungitell Quantitative Value 42 <60 pg/mL pg/mL    Fungitell Qualitative Result Negative Negative   Quantiferon Gold TB    Collection Time: 11/28/24  3:47 PM   Result Value Ref Range    QuantiFERON-Tb Gold Plus Result Indeterminate (A) Negative    TB1 Ag minus Nil Result 0.00 IU/mL    TB2 Ag minus Nil Result 0.00 IU/mL    Mitogen minus Nil Result 0.10 IU/mL    Nil Result 0.01 IU/mL   Hemoglobin A1C    Collection Time: 11/29/24  3:18 AM   Result Value Ref Range    Hemoglobin A1c 5.6 <=7.0 %    Estimated Average Glucose 114.0 mg/dL   Basic Metabolic Panel    Collection Time: 11/29/24  3:18 AM   Result Value Ref Range    Sodium 142 136 - 145 mmol/L    Potassium 3.7 3.5 - 5.1 mmol/L    Chloride 100 98 - 107 mmol/L    CO2 32 (H) 23 - 31 mmol/L    Glucose 94 82 - 115 mg/dL    Blood Urea Nitrogen 22.9 (H) 9.8 - 20.1 mg/dL    Creatinine 0.64 0.55 - 1.02 mg/dL    BUN/Creatinine Ratio 36     Calcium 8.5 8.4 - 10.2 mg/dL    Anion Gap 10.0 mEq/L    eGFR >60 mL/min/1.73/m2   Magnesium    Collection Time: 11/29/24  3:18 AM   Result Value Ref Range    Magnesium Level 1.60 1.60 - 2.60 mg/dL   Phosphorus    Collection Time: 11/29/24  3:18 AM   Result Value Ref Range    Phosphorus Level 2.5 2.3 - 4.7 mg/dL   CBC with Differential    Collection Time: 11/29/24  3:18 AM   Result Value Ref Range    WBC 12.90 (H) 4.50 - 11.50 x10(3)/mcL    RBC 3.61 (L) 4.20 - 5.40 x10(6)/mcL    Hgb 11.6 (L) 12.0 - 16.0 g/dL    Hct 35.2 (L) 37.0 - 47.0 %    MCV 97.5 (H) 80.0 - 94.0 fL    MCH 32.1 (H) 27.0 - 31.0 pg    MCHC 33.0 33.0  - 36.0 g/dL    RDW 14.2 11.5 - 17.0 %    Platelet 560 (H) 130 - 400 x10(3)/mcL    MPV 9.7 7.4 - 10.4 fL    Neut % 79.5 %    Lymph % 11.5 %    Mono % 5.9 %    Eos % 1.2 %    Basophil % 0.2 %    Lymph # 1.48 0.6 - 4.6 x10(3)/mcL    Neut # 10.25 (H) 2.1 - 9.2 x10(3)/mcL    Mono # 0.76 0.1 - 1.3 x10(3)/mcL    Eos # 0.16 0 - 0.9 x10(3)/mcL    Baso # 0.03 <=0.2 x10(3)/mcL    IG# 0.22 (H) 0 - 0.04 x10(3)/mcL    IG% 1.7 %    NRBC% 0.0 %   Respiratory Culture    Collection Time: 11/29/24  9:02 AM    Specimen: Sputum, Expectorated   Result Value Ref Range    Respiratory Culture Rare Yeast (A)     GRAM STAIN Quality 2+     GRAM STAIN Rare Gram positive cocci     GRAM STAIN Rare Gram Positive Rods    Echo    Collection Time: 11/29/24 10:24 AM   Result Value Ref Range    BSA 1.47 m2    Tam's Biplane MOD Ejection Fraction 68 %    A2C EF 74 %    A4C EF 59 %    LVOT stroke volume 72.8 cm3    LVIDd 4.0 3.5 - 6.0 cm    LV Systolic Volume 22.30 mL    LV Systolic Volume Index 15.2 mL/m2    LVIDs 2.5 2.1 - 4.0 cm    LV ESV A2C 21.60 mL    LV Diastolic Volume 70.00 mL    LV ESV A4C 23.60 mL    LV Diastolic Volume Index 47.62 mL/m2    LV EDV A2C [id number] mL    LV EDV A4C 25.90 mL    Left Ventricular End Systolic Volume by Teichholz Method 22.30 mL    Left Ventricular End Diastolic Volume by Teichholz Method 70.00 mL    IVS 1.1 0.6 - 1.1 cm    LVOT diameter 1.7 cm    LVOT area 2.3 cm2    FS 37.5 28 - 44 %    Left Ventricle Relative Wall Thickness 0.55 cm    PW 1.1 0.6 - 1.1 cm    LV mass 145.6 g    LV Mass Index 99.1 g/m2    MV Peak E Steve 1.25 m/s    TDI LATERAL 0.08 m/s    TDI SEPTAL 0.09 m/s    E/E' ratio 14.71 m/s    MV Peak A Steve 1.35 m/s    TR Max Steve 2.02 m/s    E/A ratio 0.93     E wave deceleration time 121.00 msec    LV SEPTAL E/E' RATIO 13.89 m/s    LV LATERAL E/E' RATIO 15.63 m/s    LVOT peak steve 1.4 m/s    Left Ventricular Outflow Tract Mean Velocity 0.89 cm/s    Left Ventricular Outflow Tract Mean Gradient 4.00  mmHg    TAPSE 3.07 cm    LA size 3.00 cm    LA Vol (MOD) 23.60 mL    LEIF (MOD) 16.1 mL/m2    AV mean gradient 8.0 mmHg    AV peak gradient 16.0 mmHg    Ao peak char 2.0 m/s    Ao VTI 26.5 cm    LVOT peak VTI 32.1 cm    AV valve area 2.7 cm²    AV Velocity Ratio 0.70     AV index (prosthetic) 1.21     KAMI by Velocity Ratio 1.6 cm²    MV mean gradient 5 mmHg    MV peak gradient 11 mmHg    MV stenosis pressure 1/2 time 98.00 ms    MV valve area p 1/2 method 2.24 cm2    MV valve area by continuity eq 2.28 cm2    MV VTI 31.9 cm    Triscuspid Valve Regurgitation Peak Gradient 16 mmHg    Mean e' 0.09 m/s    ZLVIDS -0.71     ZLVIDD -0.96     LA area A4C 10.90 cm2    LA area A2C 10.00 cm2    RVDD 2.60 cm    TV resting pulmonary artery pressure 19 mmHg    RV TB RVSP 5 mmHg    Est. 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 Hospital discharge follow-up  Assessment & Plan:  She was inpatient 11/27/24-11/29/24. For copd exacerbation.  She has completed antibiotics and steroids. Using her inhalers and  nebs. Sample of breztri given.  She was not discharged with home O2.  O2 has been between 93-94% on RA.  She was not discharged with HH. Does not feel she needs.  She is scheduled to establish with pulm- dr. Schmidt- 3/2025.  She is still smoking. Working on cutting back.  Having to take of nicotine patch to smoke. Is referred to smoking cessation class.  Will order pfts.     She has [place] paperwork that will be faxed to our office. Has been out of work from 11/23/24 and would like to stay out until 12/30/24      2. COPD exacerbation  Assessment & Plan:  See hospital discharge A&P    Orders:  -     Complete PFT w/ bronchodilator; Future    3. 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 No recent seizures.  Keep appts with neuro.       4. Crohn's disease with complication, unspecified gastrointestinal tract location  Assessment & Plan:  On humira. Keep appts with GI      5. Generalized anxiety disorder  Assessment & Plan:  Stable on buspar prn      6. Primary hypertension  Assessment & Plan:  Well controlled on current prescription.just on lisinopril 20mg.  Hctz stopped at recent inpatient stay.   Does not see cards. Not on asa.       7. Tobacco user  Assessment & Plan:  Encouraged smoking cessation. Referral placed.     Orders:  -     Ambulatory referral/consult to Smoking Cessation Program; Future; Expected date: 12/12/2024         No follow-ups on file.

## 2024-12-05 NOTE — TELEPHONE ENCOUNTER
----- Message from Emi sent at 12/5/2024 10:24 AM CST -----  .Type:  Needs Medical Advice    Who Called: pt  Would the patient rather a call back or a response via [facility]?   Best Call Back Number: [phone]  Additional Information: excuse for work time off - needs paperwork filled out

## 2024-12-05 NOTE — ASSESSMENT & PLAN NOTE
Well controlled on current prescription.just on lisinopril 20mg.  Hctz stopped at recent inpatient stay.   Does not see cards. Not on asa.

## 2024-12-05 NOTE — ASSESSMENT & PLAN NOTE
She was inpatient 11/27/24-11/29/24. For copd exacerbation.  She has completed antibiotics and steroids. Using her inhalers and nebs. Sample of breztri given.  She was not discharged with home O2.  O2 has been between 93-94% on RA.  She was not discharged with HH. Does not feel she needs.  She is scheduled to establish with pulm- dr. Schmidt- 3/2025.  She is still smoking. Working on cutting back.  Having to take of nicotine patch to smoke. Is referred to smoking cessation class.  Will order pfts.     She has [facility] paperwork that will be faxed to our office. Has been out of work from 11/23/24 and would like to stay out until 12/30/24

## 2024-12-05 NOTE — TELEPHONE ENCOUNTER
Patient has copd.  Recently inpatient for copd exacerbation.  Has initial appt with dr. Schmidt but not until 3/2025. Can we call to see if she can be seen sooner please>?

## 2024-12-06 ENCOUNTER — TELEPHONE (OUTPATIENT)
Dept: FAMILY MEDICINE | Facility: CLINIC | Age: 64
End: 2024-12-06
Payer: COMMERCIAL

## 2024-12-06 NOTE — TELEPHONE ENCOUNTER
I ordered pfts yesterday.   She has had cxr and ct chest 11/27/24. That is usually all pulm requires before appt.    Was there another test she needed? Did pulm call her to request more testing?

## 2024-12-06 NOTE — TELEPHONE ENCOUNTER
----- Message from Diane sent at 12/6/2024  8:13 AM CST -----  .Who Called: Judy Vides    Caller is requesting assistance/information from provider's office.    Symptoms (please be specific): pt states that she needs another scan because she needs to see a lung specialist like the pcp suggested.    How long has patient had these symptoms:    List of preferred pharmacies on file (remove unneeded): [unfilled]  If different, enter pharmacy into here including location and phone number:       Preferred Method of Contact: Phone Call  Patient's Preferred Phone Number on File: [phone]   Best Call Back Number, if different:  Additional Information:

## 2024-12-09 NOTE — DISCHARGE SUMMARY
[facility] Medicine Discharge Summary    Admit Date: 11/27/2024  Discharge Date and Time: 11/29/2024, 11:33 am   Admitting Physician:  Team  Discharging Physician: Adama Eisenberg MD.  Primary Care Physician: Anita Mcqueen MD  Consults: None    Discharge Diagnoses:  COPD with acute exacerbation, POA  Atypical pneumonia with CT evidence of bilateral pneumonitis with nodular opacities and areas of subpleural consolidation, POA  Acute hypoxic respiratory failure due to above, POA  Leukocytosis due to infection, POA  Thrombocytosis, likely reactive -POA  Mixed Metabolic alkalosis and respiratory acidosis , POA - in the setting of diuretic therapy with HCTZ  Hypokalemia  due to HCTZ therapy, POA  Hyperglycemia due to steroid treatment  Elevated BNP - likely in the setting COPD exacerbation , no evidence of cardiac decompensation   Immunosuppressed status due to drug therapy  Current everyday smoker and tobacco dependency       Hx- HTN, seizure disorder, Crohn's disease, thrombocytosis         Hospital Course:     63 yo female with PMHx of life long smoker,  COPD, HTN, seizure disorder, Crohn's disease on Humira, thrombocytosis ( recently evaluated by Dr. Kc)  presented to ED with complaints of headache and shortness of breath. Patient states she thinks headaches are due to decreased oxygen. Took O2 at home this am and was in low 80s. Thinks it has been low for a week. States has also been coughing up greenish sputum for the past week. Feels she had fever then but did not take temp. Thought she had a sinus infection and attempted to take tylenol and cough syrup with no relief. Has been using her neb treatments more frequently in this time with minimal help. Reports compliance with inhalers. Still continues to smoke. States headache has resolved. Denies chest pain, nausea, vomiting, diarrhea, dysuria, dizziness, blurry vision, sick contacts.      In ED, afebrile with O2  87% on RA. Labs signficant for elevated WBC 22, platelet 635, K 3.0, chloride 92, bicarb 37. CXR with trace pleural thickening with pulmonary hyperinflation with chronic changes. No prior imaging to compare. Admitted to  service for COPD exacerbation, acute hypoxic resp failure and severe leukocytosis. CT chest done on 11/27 showed scattered patchy nodular opacities in both lungs with few scattered small areas of subpleural consolidation likely infectious or inflammatory with chronic indolent infection possible per radiology read. Pt was started on supplemental oxygen, Antibiotic Azithromycin /Ceftriaxone, systemic steroid, aggressive nebulized bronchodilators and ICS. Antibiotic change to respiratory flouroquinolone Levofloxacin to cover pseudomonas.  Leukocytosis improved. SOB and Wheezing improved. Sputum was clearing up. O2 wean down to RA. Home O2 eval performed. Did not qualify. Home BP med HCTZ held and plain Lisinopril continued. Hypokalemia and metabolic alkalosis assumed to be due to HCTZ. Potassium replaced appropriately. Pt improved clinically and deemed suitable for discharge to home on Oral Prednisone and oral Levofloxacin. Pt was referred to Pulmonology as outpatient for new Pt visit and to follow up on CT chest . Pt will also follow up with PCP upon discharge.       Pt was seen and examined on the day of discharge  Vitals:  VITAL SIGNS: 24 HRS MIN & MAX LAST   No data recorded 97.5 °F (36.4 °C)   No data recorded (!) 151/74   No data recorded  91   No data recorded (!) 24   No data recorded (!) 92 %       Physical Exam:    General: In no acute distress, afebrile, on NC  Chest: faint wheezes darien  Heart: RRR, +S1, S2, no appreciable murmur  Abdomen: Soft, nontender, BS +  MSK: Warm, no lower extremity edema, no clubbing or cyanosis  Neurologic: Alert and oriented x4, Cranial nerve II-XII intact, Strength 5/5 in all 4 extremities  Procedures Performed: No admission procedures for hospital encounter.  "    Significant Diagnostic Studies: See Full reports for all details    No results for input(s): "WBC", "RBC", "HGB", "HCT", "MCV", "MCH", "MCHC", "RDW", "PLT", "MPV", "GRAN", "LYMPH", "MONO", "BASO", "NRBC" in the last 168 hours.    No results for input(s): "NA", "K", "CL", "CO2", "ANIONGAP", "BUN", "CREATININE", "GLU", "CALCIUM", "PH", "MG", "ALBUMIN", "PROT", "ALKPHOS", "ALT", "AST", "BILITOT" in the last 168 hours.     Microbiology Results (last 7 days)       ** No results found for the last 168 hours. **             Echo    Left Ventricle: The left ventricle is normal in size. Mildly increased   wall thickness. There is normal systolic function with a visually   estimated ejection fraction of 55 - 60%. There is normal diastolic   function.    Right Ventricle: Normal right ventricular cavity size. Wall thickness   is normal. Systolic function is normal. TAPSE is 3.07 cm.    Mitral Valve: There is mild regurgitation.    Tricuspid Valve: There is mild regurgitation.    IVC/SVC: Normal venous pressure at 3 mmHg.         Medication List        START taking these medications      dextromethorphan-guaiFENesin  mg/5 ml  mg/5 mL liquid  Commonly known as: ROBITUSSIN-DM  Take 10 mLs by mouth every 6 (six) hours. for 10 days     lisinopriL 20 MG tablet  Commonly known as: PRINIVIL,ZESTRIL  Take 1 tablet (20 mg total) by mouth once daily.     nicotine 14 mg/24 hr  Commonly known as: NICODERM CQ  Place 1 patch onto the skin once daily.            CONTINUE taking these medications      albuterol 90 mcg/actuation inhaler  Commonly known as: PROVENTIL/VENTOLIN HFA  Inhale 2 puffs into the lungs every 6 (six) hours as needed for Wheezing. Rescue     BREZTRI AEROSPHERE 160-9-4.8 mcg/actuation Hfaa  Generic drug: budesonide-glycopyr-formoterol  Inhale 2 puffs by mouth twice daily     calcium carbonate 600 mg calcium (1,500 mg) Tab  Commonly known as: OS-NASRIN     cyanocobalamin 1,000 mcg/mL injection     ergocalciferol " 50,000 unit Cap  Commonly known as: ERGOCALCIFEROL     HUMIRA(CF) PEN 40 mg/0.4 mL Pnkt  Generic drug: adalimumab     NEBULIZER MISC     nebulizers Misc  1 each by Misc.(Non-Drug; Combo Route) route daily as needed (PRN COPD). RAJNI: 99 Dispense with preferred supplies     OXcarbazepine 600 MG Tab  Commonly known as: TRILEPTAL  Take 2 tablets (1,200 mg total) by mouth every evening.     RECLAST 5 mg/100 mL Pgbk  Generic drug: zoledronic acid-mannitol & water     SYSTANE GEL OPHT            STOP taking these medications      lisinopriL-hydrochlorothiazide 20-25 mg Tab  Commonly known as: PRINZIDE,ZESTORETIC            ASK your doctor about these medications      predniSONE 20 MG tablet  Commonly known as: DELTASONE  Take 2 tablets (40 mg total) by mouth once daily. for 5 days  Ask about: Should I take this medication?               Where to Get Your Medications        These medications were [facility] to [id number]  [place] [facility] [address] [facility]  [address] [place], [place] [address] [address]      Phone: [phone]   lisinopriL 20 MG tablet  predniSONE 20 MG tablet       You can get these medications from any pharmacy    You don't need a prescription for these medications  dextromethorphan-guaiFENesin  mg/5 ml  mg/5 mL liquid  nicotine 14 mg/24 hr          Explained in detail to the patient about the discharge plan, medications, and follow-up visits. Pt understands and agrees with the treatment plan  Discharge Disposition: Home or Self Care   Discharged Condition: stable  Diet-    Medications Per DC med rec  Activities as tolerated   Follow-up Information       Anita Mcqueen MD. Schedule an appointment as soon as possible for a visit in 3 day(s).    Specialty: Family Medicine  Why: Hospital discharge follow up  Contact information:  [address]  [address]  [place] [id number] [phone]               Pulmonology Referral Follow up.    Why: Will need pulmonology  referral/follow up after discharge. Patient requesting Dr. Salas Ruiz.    We will schedule this appointment for you.             JOSE Ruiz MD. Schedule an appointment as soon as possible for a visit in 1 week(s).    Specialty: Pulmonary Disease  Why: New pt appointment  Contact information:  [address] [place] [address] [address]  [place] [address]  [phone]                           For further questions contact hospitalist office    Discharge time 33 minutes    For worsening symptoms, chest pain, shortness of breath, increased abdominal pain, high grade fever, stroke or stroke like symptoms, immediately go to the nearest Emergency Room or call 911 as soon as possible.      Adama Sosa M.D, on 11/29/2024, 11:33 am

## 2024-12-10 ENCOUNTER — TELEPHONE (OUTPATIENT)
Dept: ENDOCRINOLOGY | Facility: CLINIC | Age: 64
End: 2024-12-10

## 2024-12-10 ENCOUNTER — OFFICE VISIT (OUTPATIENT)
Dept: ENDOCRINOLOGY | Facility: CLINIC | Age: 64
End: 2024-12-10
Payer: COMMERCIAL

## 2024-12-10 ENCOUNTER — TELEPHONE (OUTPATIENT)
Dept: FAMILY MEDICINE | Facility: CLINIC | Age: 64
End: 2024-12-10
Payer: COMMERCIAL

## 2024-12-10 VITALS
HEART RATE: 86 BPM | DIASTOLIC BLOOD PRESSURE: 73 MMHG | WEIGHT: 106.69 LBS | RESPIRATION RATE: 18 BRPM | HEIGHT: 61 IN | OXYGEN SATURATION: 91 % | BODY MASS INDEX: 20.14 KG/M2 | TEMPERATURE: 98 F | SYSTOLIC BLOOD PRESSURE: 129 MMHG

## 2024-12-10 DIAGNOSIS — M81.0 OSTEOPOROSIS, UNSPECIFIED OSTEOPOROSIS TYPE, UNSPECIFIED PATHOLOGICAL FRACTURE PRESENCE: Primary | ICD-10-CM

## 2024-12-10 PROCEDURE — 99215 OFFICE O/P EST HI 40 MIN: CPT | Mod: PBBFAC

## 2024-12-10 NOTE — PROGRESS NOTES
Subjective:       Patient ID: Judy Vides is a 64 y.o. female.    Chief Complaint: Follow-up (Osteoporosis)    Judy Vides is a 64 y.o. female with a PMHx of osteoporosis previously on alendronate and now on zoledronic acid, HTN, COPD, seizure disorder, and prior vitamin D deficiency presents to [facility] clinic for follow-up for osteoporosis.    Patient was last seen in clinic on 11/4/2022. At that patient, patient had received Reclast x3 which she tolerated well. RFP at that time was WNL. Today, patient states that she recently had a ground-level fall after slipping in her shower and hitting her right chest. She presented to [facility] for evaluation in which she reported that there are no acute fractures. Per chart review, impression of XR notable for chronic fractures, but no acute fractures noted. Otherwise, her last zolendronic acid therapy was on 3/2023. She had tolerated therapy in the past with no issues. She is amenable to continuing therapy at this time.  Otherwise, patient denies any fever, chills, chest pain, SOB, nausea, vomiting, constipation, diarrhea, and changes in urination.    Most recent lab workup:  CMP: eGFR > 60 and creatinine WNL.  Vitamin D 62 on 8/2024.  Last DEXA (11/2022) showing osteoporosis of the lumbar spine (T-score -3.3) and bilateral femurs (T-score -3.2)  8/2024 XR: Multiple chronic right rib fractures without acute rib fracture identified. No acute cardiopulmonary abnormality.  8/2024 CT chest: No acute fracture is identified. There is a chronic fracture at the right lateral 5th rib. There are chronic ununited fractures at the right posterolateral 6th and 7th ribs as well as at the right posterolateral 10th rib and chronic healed fracture at the right posterolateral 8th rib. There are chronic fractures at the right posterior 5th and 6th ribs just lateral to the vertebral body transverse processes. There is diffuse osteopenia and diffuse degenerative disc  disease at the cervical and thoracic spine.    Follow-up  Associated symptoms include arthralgias (Knee pain). Pertinent negatives include no abdominal pain, chest pain, chills, fever, nausea or vomiting.     Review of Systems   Constitutional:  Negative for chills and fever.   Respiratory:  Negative for shortness of breath.    Cardiovascular:  Negative for chest pain.   Gastrointestinal:  Negative for abdominal pain, constipation, diarrhea, nausea and vomiting.   Musculoskeletal:  Positive for arthralgias (Knee pain).         Objective:      Physical Exam  Vitals reviewed.   Constitutional:       General: She is not in acute distress.     Appearance: Normal appearance. She is normal weight. She is not ill-appearing or toxic-appearing.   HENT:      Head: Normocephalic and atraumatic.   Eyes:      General: No scleral icterus.     Conjunctiva/sclera: Conjunctivae normal.   Cardiovascular:      Rate and Rhythm: Normal rate and regular rhythm.      Pulses: Normal pulses.      Heart sounds: Normal heart sounds. No murmur heard.  Pulmonary:      Effort: Pulmonary effort is normal. No respiratory distress.      Breath sounds: Wheezing present. No rhonchi or rales.   Abdominal:      General: Abdomen is flat. Bowel sounds are normal. There is no distension.      Palpations: Abdomen is soft.      Tenderness: There is no guarding or rebound.   Musculoskeletal:      Right lower leg: No edema.      Left lower leg: No edema.   Skin:     General: Skin is warm and dry.      Capillary Refill: Capillary refill takes less than 2 seconds.   Neurological:      General: No focal deficit present.      Mental Status: She is alert.   Psychiatric:         Mood and Affect: Mood normal.         Assessment:       Problem List Items Addressed This Visit          Endocrine    Osteoporosis - Primary    Relevant Orders    DXA Bone Density Axial Skeleton 1 or more sites    Vitamin D deficiency        Plan:   Osteoporosis, unspecified osteoporosis  type, unspecified pathological fracture presence  - Patient previously had Reclast x4 doses. Last dose on 3/2023.  - Last DEXA scan on 11/2022 showing osteoporosis of the lumbar spine and bilateral femoral necks.  - Patient had a fall around 8/2024 and fell on her right side. No evidence of acute fractures on XR, but it showed chronic fractures that were noted in prior imaging at least in 2012.  - Ordered repeat DEXA for monitoring.  - Continue Reclast therapy. Will message to schedule.    Prior history of vitamin D deficiency (resolved)  - Patient previously had vitamin D deficiency, but most recent vitamin D 62 on 8/2024 after repletion by PCP.  - Will peripherally monitor vitamin D and calcium levels and defer further management to PCP.    Tobacco use disorder  - Patient reports continued smoking with 1 ppd since surgery in 7/2024. Reports smoking since age 13.  - Recommended talking to PCP regarding starting Wellbutrin or Chantix therapy if amenable.      F/u in 1 year or earlier should signs/symptoms warrant.      Patient was seen and discussed with Dr. Xiao. Please appreciate attending's attestation to follow.    Lloyd Marquez MD  PGY-1 Resident  [facility] Internal Medicine  12/10/2024

## 2024-12-10 NOTE — TELEPHONE ENCOUNTER
----- Message from Emi sent at 12/10/2024  2:30 PM CST -----  Regarding: paper work  .Type:  Needs Medical Advice    Who Called: pt  Would the patient rather a call back or a response via [facility]? dk  Best Call Back Number: [phone]  Additional Information: update on paperwork

## 2024-12-11 NOTE — ADDENDUM NOTE
Addended by: MICHELLE PHILLIPS on: 12/11/2024 01:45 PM     Modules accepted: Orders     The patient is a 53y Male complaining of abdominal pain.

## 2024-12-18 LAB — CRC RECOMMENDATION EXT: NORMAL

## 2024-12-19 ENCOUNTER — HOSPITAL ENCOUNTER (OUTPATIENT)
Dept: RADIOLOGY | Facility: HOSPITAL | Age: 64
Discharge: HOME | End: 2024-12-19
Payer: COMMERCIAL

## 2024-12-19 DIAGNOSIS — M81.0 OSTEOPOROSIS, UNSPECIFIED OSTEOPOROSIS TYPE, UNSPECIFIED PATHOLOGICAL FRACTURE PRESENCE: ICD-10-CM

## 2024-12-19 PROCEDURE — 77080 DXA BONE DENSITY AXIAL: CPT | Mod: TC

## 2024-12-20 ENCOUNTER — DOCUMENTATION ONLY (OUTPATIENT)
Dept: FAMILY MEDICINE | Facility: CLINIC | Age: 64
End: 2024-12-20
Payer: COMMERCIAL

## 2024-12-30 RX ORDER — LISINOPRIL 20 MG/1
20 TABLET ORAL DAILY
Qty: 30 TABLET | Refills: 0 | Status: SHIPPED | OUTPATIENT
Start: 2024-12-30 | End: 2025-01-29

## 2024-12-30 NOTE — TELEPHONE ENCOUNTER
----- Message from Glenny sent at 12/30/2024 12:30 PM CST -----  Who Called: Judy Vides    Refill or New Rx:Refill  RX Name and Strength:lisinopriL (PRINIVIL,ZESTRIL) 20 MG tablet  How is the patient currently taking it? (ex. 1XDay):1x  Is this a 30 day or 90 day RX:  Local or Mail Order:  List of preferred pharmacies on file (remove unneeded): [unfilled]  If different Pharmacy is requested, enter Pharmacy information here including location and phone number: [facility] [address] [place] [address], [place] [address]       Ordering Provider:[facility]        Patient's Preferred Phone Number on File: [phone]   Best Call Back Number, if different:  Additional Information:

## 2025-01-07 DIAGNOSIS — G40.909 SEIZURE DISORDER: ICD-10-CM

## 2025-01-08 RX ORDER — OXCARBAZEPINE 600 MG/1
TABLET, FILM COATED ORAL
Qty: 180 TABLET | Refills: 4 | Status: SHIPPED | OUTPATIENT
Start: 2025-01-08

## 2025-01-09 ENCOUNTER — TELEPHONE (OUTPATIENT)
Dept: SMOKING CESSATION | Facility: CLINIC | Age: 65
End: 2025-01-09
Payer: COMMERCIAL

## 2025-01-09 NOTE — TELEPHONE ENCOUNTER
Left voice message with contact information regarding rescheduling her canceled [facility] appointment.

## 2025-01-13 DIAGNOSIS — I10 PRIMARY HYPERTENSION: Primary | Chronic | ICD-10-CM

## 2025-01-13 RX ORDER — LISINOPRIL 20 MG/1
20 TABLET ORAL DAILY
Qty: 30 TABLET | Refills: 0 | Status: SHIPPED | OUTPATIENT
Start: 2025-01-13 | End: 2025-02-12

## 2025-01-13 NOTE — TELEPHONE ENCOUNTER
----- Message from Glenny sent at 1/13/2025  8:19 AM CST -----  Who Called: Judy Vides    Caller is requesting assistance/information from provider's office.    Symptoms (please be specific):    How long has patient had these symptoms:    List of preferred pharmacies on file (remove unneeded): [unfilled]  If different, enter pharmacy into here including location and phone number:       Patient's Preferred Phone Number on File: [phone]   Best Call Back Number, if different:  Additional Information: pt called to inform pt she is unable to complete breathing test ordered , due to being unable to use inhaler for  8hrs, pt req a different test, please follow up         Refill or New Rx:Refill    RX Name and Strength:lisinopriL (PRINIVIL,ZESTRIL) 20 MG tablet  How is the patient currently taking it? (ex. 1XDay):1x  Is this a 30 day or 90 day RX:  Local or Mail Order:  List of preferred pharmacies on file (remove unneeded): [facility]  If different Pharmacy is requested, enter Pharmacy information here including location and phone number: lisinopriL (PRINIVIL,ZESTRIL) 20 MG tablet   Ordering Provider:        Patient's Preferred Phone Number on File: [phone]   Best Call Back Number, if different:  Additional Information:

## 2025-01-13 NOTE — TELEPHONE ENCOUNTER
Would she like to see pulmonology since she is unable to complete pft?    I have signed for the following orders AND/OR meds.  Please call the patient and ask the patient to schedule the testing AND/OR inform about any medications that were sent.        Medications Ordered This Encounter   Medications    lisinopriL (PRINIVIL,ZESTRIL) 20 MG tablet     Sig: Take 1 tablet (20 mg total) by mouth once daily.     Dispense:  30 tablet     Refill:  0     .

## 2025-02-01 DIAGNOSIS — I10 PRIMARY HYPERTENSION: Chronic | ICD-10-CM

## 2025-02-03 RX ORDER — LISINOPRIL AND HYDROCHLOROTHIAZIDE 20; 25 MG/1; MG/1
1 TABLET ORAL DAILY
Qty: 90 TABLET | Refills: 0 | OUTPATIENT
Start: 2025-02-03 | End: 2026-02-03

## 2025-02-24 ENCOUNTER — OFFICE VISIT (OUTPATIENT)
Dept: FAMILY MEDICINE | Facility: CLINIC | Age: 65
End: 2025-02-24
Payer: COMMERCIAL

## 2025-02-24 VITALS
SYSTOLIC BLOOD PRESSURE: 168 MMHG | HEIGHT: 61 IN | OXYGEN SATURATION: 93 % | WEIGHT: 112.38 LBS | RESPIRATION RATE: 19 BRPM | HEART RATE: 80 BPM | TEMPERATURE: 97 F | DIASTOLIC BLOOD PRESSURE: 70 MMHG | BODY MASS INDEX: 21.22 KG/M2

## 2025-02-24 DIAGNOSIS — I10 PRIMARY HYPERTENSION: Primary | Chronic | ICD-10-CM

## 2025-02-24 DIAGNOSIS — G40.909 SEIZURE DISORDER: ICD-10-CM

## 2025-02-24 DIAGNOSIS — J44.9 CHRONIC OBSTRUCTIVE PULMONARY DISEASE, UNSPECIFIED COPD TYPE: ICD-10-CM

## 2025-02-24 DIAGNOSIS — I10 PRIMARY HYPERTENSION: Chronic | ICD-10-CM

## 2025-02-24 DIAGNOSIS — F41.1 GENERALIZED ANXIETY DISORDER: Chronic | ICD-10-CM

## 2025-02-24 DIAGNOSIS — G40.309 NONINTRACTABLE GENERALIZED IDIOPATHIC EPILEPSY WITHOUT STATUS EPILEPTICUS: ICD-10-CM

## 2025-02-24 DIAGNOSIS — K50.919 CROHN'S DISEASE WITH COMPLICATION, UNSPECIFIED GASTROINTESTINAL TRACT LOCATION: ICD-10-CM

## 2025-02-24 PROCEDURE — 3078F DIAST BP <80 MM HG: CPT | Mod: CPTII,,, | Performed by: NURSE PRACTITIONER

## 2025-02-24 PROCEDURE — 1159F MED LIST DOCD IN RCRD: CPT | Mod: CPTII,,, | Performed by: NURSE PRACTITIONER

## 2025-02-24 PROCEDURE — 4010F ACE/ARB THERAPY RXD/TAKEN: CPT | Mod: CPTII,,, | Performed by: NURSE PRACTITIONER

## 2025-02-24 PROCEDURE — 1160F RVW MEDS BY RX/DR IN RCRD: CPT | Mod: CPTII,,, | Performed by: NURSE PRACTITIONER

## 2025-02-24 PROCEDURE — 99214 OFFICE O/P EST MOD 30 MIN: CPT | Mod: ,,, | Performed by: NURSE PRACTITIONER

## 2025-02-24 PROCEDURE — 3077F SYST BP >= 140 MM HG: CPT | Mod: CPTII,,, | Performed by: NURSE PRACTITIONER

## 2025-02-24 PROCEDURE — 3288F FALL RISK ASSESSMENT DOCD: CPT | Mod: CPTII,,, | Performed by: NURSE PRACTITIONER

## 2025-02-24 PROCEDURE — 1126F AMNT PAIN NOTED NONE PRSNT: CPT | Mod: CPTII,,, | Performed by: NURSE PRACTITIONER

## 2025-02-24 PROCEDURE — 1101F PT FALLS ASSESS-DOCD LE1/YR: CPT | Mod: CPTII,,, | Performed by: NURSE PRACTITIONER

## 2025-02-24 PROCEDURE — 3008F BODY MASS INDEX DOCD: CPT | Mod: CPTII,,, | Performed by: NURSE PRACTITIONER

## 2025-02-24 RX ORDER — LISINOPRIL 20 MG/1
20 TABLET ORAL
Qty: 30 TABLET | Refills: 0 | Status: SHIPPED | OUTPATIENT
Start: 2025-02-24 | End: 2025-02-24 | Stop reason: SDUPTHER

## 2025-02-24 RX ORDER — LISINOPRIL 40 MG/1
40 TABLET ORAL DAILY
Qty: 90 TABLET | Refills: 3 | Status: SHIPPED | OUTPATIENT
Start: 2025-02-24

## 2025-02-24 RX ORDER — OFLOXACIN 3 MG/ML
1 SOLUTION/ DROPS OPHTHALMIC 2 TIMES DAILY
COMMUNITY
Start: 2025-01-26

## 2025-02-24 NOTE — PROGRESS NOTES
Subjective:      Patient ID: Judy Vides is a 64 y.o. White female      Chief Complaint: Follow-up (Chronic Illnesses )       Past Medical History:   Diagnosis Date    Chronic obstructive pulmonary disease 08/17/2022    Crohn disease     Epilepsy     Generalized anxiety disorder 08/17/2022    Headache 08/17/2022    Hemorrhoids, external 02/23/2023    Hypertension     Neck pain 08/17/2022    Osteoporosis     Seizure disorder 08/17/2022    Tobacco user 08/17/2022    Vitamin B deficiency, unspecified 02/23/2023        HPI  Presents to clinic for 6-month follow up.    COPD:  Previously she saw dr. Hopkins.  Has appt with Dr. Schmidt 3/13/2025.  States complaint with Breztri, albuterol inhaler, and albuterol nebs.      Anxiety:  Currently on Buspar.      HTN:  BP elevated today, and states elevated at home.  States complaint with Lisinopril 20 mg po daily.   Denies any headaches, weakness, dizziness, CP, or SOB.      Crohns and follows with GI NP Wanda Cedeño. She is on humira.  Tolerating well.      Seizures discorder:  Follows Neurology, NP Angie Falk.  Currently taking oxcarbazepine.  No seizures in years.                 Patient Care Team:  Anita Mcqueen MD as PCP - General (Family Medicine)  Angie Falk NP as Nurse Practitioner (Neurology)  Wanda Cedeño ANP as Nurse Practitioner (Internal Medicine)  Marcello Xiao MD as Consulting Physician (Endocrinology)  Ricky Schmidt MD as Consulting Physician (Pulmonary Disease)      Review of Systems   Constitutional: Negative.  Negative for appetite change, chills and fever.   HENT: Negative.     Eyes: Negative.  Negative for discharge and redness.   Respiratory: Negative.  Negative for shortness of breath.    Cardiovascular: Negative.  Negative for chest pain.   Gastrointestinal: Negative.    Endocrine: Negative.    Genitourinary: Negative.    Integumentary:  Negative.   Allergic/Immunologic: Negative.    Neurological: Negative.     Psychiatric/Behavioral: Negative.     All other systems reviewed and are negative.          Objective:      Vitals:    02/24/25 0907   BP: (!) 168/70   Pulse:    Resp:    Temp:       Body mass index is 21.24 kg/m².     Physical Exam  Vitals reviewed.   Constitutional:       Appearance: Normal appearance.   HENT:      Head: Normocephalic.      Mouth/Throat:      Mouth: Mucous membranes are moist.   Eyes:      Conjunctiva/sclera: Conjunctivae normal.      Pupils: Pupils are equal, round, and reactive to light.   Cardiovascular:      Rate and Rhythm: Normal rate and regular rhythm.   Pulmonary:      Effort: Pulmonary effort is normal. No respiratory distress.      Breath sounds: Normal breath sounds.   Musculoskeletal:         General: Normal range of motion.      Cervical back: Normal range of motion and neck supple.   Skin:     General: Skin is warm and dry.   Neurological:      Mental Status: She is alert and oriented to person, place, and time.   Psychiatric:         Mood and Affect: Mood normal.          Current Medications[1]    Assessment & Plan:     Problem List Items Addressed This Visit          Neuro    Seizure disorder    Stable  Continue current meds (On oxcarbazepine)  Keep appt with Neurology for follow up         Epilepsy    Stable  Continue current meds (On oxcarbazepine)  Keep appt with Neurology for follow up            Psychiatric    Generalized anxiety disorder (Chronic)    Stable  Continue current meds  Keep appt with PCP for follow up              Pulmonary    Chronic obstructive pulmonary disease    Stable  Continue inhalers as prescribed  Keep appt with PCP for follow up              Cardiac/Vascular    Hypertension - Primary (Chronic)    BP elevated; Asymptomatic  Currently taking Lisinopril 20 mg po daily  Instructed to increase Lisinopril to 30 mg po daily x 1 week (20 mg tablet; 1.5 tablet daily)  If SBP remains elevated, or >140, Increase Lisinopril to 40 mg po daily; Rx sent    Instructed to take BP meds prior to all clinic appointments  Instructed to report to ED for any BP >200/100, SOB, CP, and/or worsening symptoms  Daily BP check; bring log to all appointments  RTC in 2 weeks for reevalauation  Verbalizes all understanding           Relevant Medications    lisinopriL (PRINIVIL,ZESTRIL) 40 MG tablet       GI    Crohn disease    Stable  Continue Humira as prescribed  Keep appt with GI for follow up                              [1]   Current Outpatient Medications:     albuterol (PROVENTIL) 2.5 mg /3 mL (0.083 %) nebulizer solution, USE 1 VIAL IN NEBULIZER 4 TIMES DAILY AS NEEDED FOR WHEEZING *RESCUE, Disp: 150 mL, Rfl: 11    albuterol (PROVENTIL/VENTOLIN HFA) 90 mcg/actuation inhaler, Inhale 2 puffs into the lungs every 6 (six) hours as needed for Wheezing. Rescue, Disp: 18 g, Rfl: 11    budesonide-glycopyr-formoterol (BREZTRI AEROSPHERE) 160-9-4.8 mcg/actuation HFAA, Inhale 2 puffs by mouth twice daily, Disp: 11 g, Rfl: 6    busPIRone (BUSPAR) 5 MG Tab, Take 5 mg by mouth 3 (three) times daily. prn, Disp: , Rfl:     calcium carbonate (OS-NASRIN) 600 mg calcium (1,500 mg) Tab, Take 1,200 mg by mouth every evening., Disp: , Rfl:     cyanocobalamin 1,000 mcg/mL injection, Inject 1,000 mcg into the muscle every 7 days., Disp: , Rfl:     ergocalciferol (ERGOCALCIFEROL) 50,000 unit Cap, Take 50,000 Units by mouth every 7 days., Disp: , Rfl:     HUMIRA,CF, PEN 40 mg/0.4 mL PnKt, , Disp: , Rfl:     hypromellose (SYSTANE GEL OPHT), Apply 1 drop to eye as needed (dry eyes)., Disp: , Rfl:     nebulizer accessories (NEBULIZER MISC), 1 each by Misc.(Non-Drug; Combo Route) route daily as needed. RAJNI: 99 Dispense with preferred suppies, Disp: , Rfl:     nebulizers Misc, 1 each by Misc.(Non-Drug; Combo Route) route daily as needed (PRN COPD). RAJNI: 99 Dispense with preferred supplies, Disp: 1 each, Rfl: 0    nicotine (NICODERM CQ) 14 mg/24 hr, Place 1 patch onto the skin once daily., Disp: , Rfl:      ofloxacin (OCUFLOX) 0.3 % ophthalmic solution, Place 1 drop into the right eye 2 (two) times daily., Disp: , Rfl:     OXcarbazepine (TRILEPTAL) 600 MG Tab, TAKE 2 TABLETS (1,200 MG) BY MOUTH IN THE EVENING, Disp: 180 tablet, Rfl: 4    zoledronic acid-mannitol & water (RECLAST) 5 mg/100 mL PgBk, Inject 5 mg into the vein once. IVPB once yearly, Disp: , Rfl:     lisinopriL (PRINIVIL,ZESTRIL) 40 MG tablet, Take 1 tablet (40 mg total) by mouth once daily., Disp: 90 tablet, Rfl: 3

## 2025-02-24 NOTE — ASSESSMENT & PLAN NOTE
BP elevated; Asymptomatic  Currently taking Lisinopril 20 mg po daily  Instructed to increase Lisinopril to 30 mg po daily x 1 week (20 mg tablet; 1.5 tablet daily)  If SBP remains elevated, or >140, Increase Lisinopril to 40 mg po daily; Rx sent   Instructed to take BP meds prior to all clinic appointments  Instructed to report to ED for any BP >200/100, SOB, CP, and/or worsening symptoms  Daily BP check; bring log to all appointments  RTC in 2 weeks for reevalauation  Verbalizes all understanding

## 2025-03-10 ENCOUNTER — OFFICE VISIT (OUTPATIENT)
Dept: FAMILY MEDICINE | Facility: CLINIC | Age: 65
End: 2025-03-10
Payer: COMMERCIAL

## 2025-03-10 VITALS
HEIGHT: 61 IN | TEMPERATURE: 98 F | HEART RATE: 79 BPM | OXYGEN SATURATION: 93 % | SYSTOLIC BLOOD PRESSURE: 154 MMHG | BODY MASS INDEX: 20.96 KG/M2 | RESPIRATION RATE: 18 BRPM | WEIGHT: 111 LBS | DIASTOLIC BLOOD PRESSURE: 76 MMHG

## 2025-03-10 DIAGNOSIS — I10 PRIMARY HYPERTENSION: Primary | Chronic | ICD-10-CM

## 2025-03-10 DIAGNOSIS — H61.23 BILATERAL IMPACTED CERUMEN: ICD-10-CM

## 2025-03-10 PROCEDURE — 99214 OFFICE O/P EST MOD 30 MIN: CPT | Mod: ,,, | Performed by: NURSE PRACTITIONER

## 2025-03-10 PROCEDURE — 1160F RVW MEDS BY RX/DR IN RCRD: CPT | Mod: CPTII,,, | Performed by: NURSE PRACTITIONER

## 2025-03-10 PROCEDURE — 3077F SYST BP >= 140 MM HG: CPT | Mod: CPTII,,, | Performed by: NURSE PRACTITIONER

## 2025-03-10 PROCEDURE — 3078F DIAST BP <80 MM HG: CPT | Mod: CPTII,,, | Performed by: NURSE PRACTITIONER

## 2025-03-10 PROCEDURE — 4010F ACE/ARB THERAPY RXD/TAKEN: CPT | Mod: CPTII,,, | Performed by: NURSE PRACTITIONER

## 2025-03-10 PROCEDURE — 3008F BODY MASS INDEX DOCD: CPT | Mod: CPTII,,, | Performed by: NURSE PRACTITIONER

## 2025-03-10 PROCEDURE — 1159F MED LIST DOCD IN RCRD: CPT | Mod: CPTII,,, | Performed by: NURSE PRACTITIONER

## 2025-03-10 RX ORDER — AMLODIPINE BESYLATE 5 MG/1
5 TABLET ORAL DAILY
Qty: 90 TABLET | Refills: 3 | Status: SHIPPED | OUTPATIENT
Start: 2025-03-10 | End: 2026-03-10

## 2025-03-10 RX ORDER — AMLODIPINE BESYLATE 5 MG/1
5 TABLET ORAL DAILY
Qty: 90 TABLET | Refills: 3 | Status: SHIPPED | OUTPATIENT
Start: 2025-03-10 | End: 2025-03-10

## 2025-03-10 NOTE — ASSESSMENT & PLAN NOTE
BP elevated; Asymptomatic  Currently taking Lisinopril 40 mg po a.m. and Norvasc 2.5 mg po nightly  Continue Lisinopril  Increase Norvasc to 5 mg po nightly; Rx sent  Instructed to take BP meds prior to all clinic appointments  Instructed to report to ED for any BP >200/100, SOB, CP, and/or worsening symptoms  Daily BP check; bring log to all appointments  RTC in 2 weeks for reevaluation   Verbalizes all understanding

## 2025-03-10 NOTE — PROGRESS NOTES
Subjective:      Patient ID: Judy Vides is a 64 y.o. White female      Chief Complaint: Follow-up (Hypertension)       Past Medical History:   Diagnosis Date    Chronic obstructive pulmonary disease 08/17/2022    Crohn disease     Epilepsy     Generalized anxiety disorder 08/17/2022    Headache 08/17/2022    Hemorrhoids, external 02/23/2023    Hypertension     Neck pain 08/17/2022    Osteoporosis     Seizure disorder 08/17/2022    Tobacco user 08/17/2022    Vitamin B deficiency, unspecified 02/23/2023        HPI  Presents today for re-evaluation of BP.      HTN:  BP elevated at previous visit.  Medication changes were made.  Of note, pt went to ED s/t elevated BP.  Norvasc 2.5 mg po daily was initated per ED.  Currently taking Lisinopril 40 mg po a.m. and Norvasc 2.5 mg po p.m.  BP elevated today.  States compliance with medications.  Denies any headaches, dizziness, syncope, CP, or SOB.  Denies any other problems.            Patient Care Team:  Anita Mcqueen MD as PCP - General (Family Medicine)  Angie Falk NP as Nurse Practitioner (Neurology)  Wanda Cedeño ANP as Nurse Practitioner (Internal Medicine)  Marcello Xiao MD as Consulting Physician (Endocrinology)  Ricky Schmidt MD as Consulting Physician (Pulmonary Disease)      Review of Systems   Constitutional: Negative.  Negative for appetite change, chills and fever.   HENT: Negative.     Eyes: Negative.  Negative for discharge and redness.   Respiratory: Negative.  Negative for shortness of breath.    Cardiovascular: Negative.  Negative for chest pain.   Gastrointestinal: Negative.    Endocrine: Negative.    Genitourinary: Negative.    Integumentary:  Negative.   Allergic/Immunologic: Negative.    Neurological: Negative.    Psychiatric/Behavioral: Negative.     All other systems reviewed and are negative.          Objective:      Vitals:    03/10/25 0819   BP: (!) 154/76   Pulse:    Resp:    Temp:       Body mass index is 20.97 kg/m².      Physical Exam  Vitals reviewed.   Constitutional:       Appearance: Normal appearance.   HENT:      Head: Normocephalic.      Right Ear: There is impacted cerumen.      Left Ear: There is impacted cerumen.      Mouth/Throat:      Mouth: Mucous membranes are moist.   Eyes:      Conjunctiva/sclera: Conjunctivae normal.      Pupils: Pupils are equal, round, and reactive to light.   Cardiovascular:      Rate and Rhythm: Normal rate and regular rhythm.   Pulmonary:      Effort: Pulmonary effort is normal. No respiratory distress.      Breath sounds: Normal breath sounds.   Musculoskeletal:         General: Normal range of motion.      Cervical back: Normal range of motion and neck supple.   Skin:     General: Skin is warm and dry.   Neurological:      Mental Status: She is alert and oriented to person, place, and time.   Psychiatric:         Mood and Affect: Mood normal.          Current Medications[1]    Assessment & Plan:     Problem List Items Addressed This Visit          ENT    Bilateral impacted cerumen    Start OTC Debrox prn  RTC in 2 weeks for cerumen removal            Cardiac/Vascular    Hypertension - Primary (Chronic)    BP elevated; Asymptomatic  Currently taking Lisinopril 40 mg po a.m. and Norvasc 2.5 mg po nightly  Continue Lisinopril  Increase Norvasc to 5 mg po nightly; Rx sent  Instructed to take BP meds prior to all clinic appointments  Instructed to report to ED for any BP >200/100, SOB, CP, and/or worsening symptoms  Daily BP check; bring log to all appointments  RTC in 2 weeks for reevaluation   Verbalizes all understanding           Relevant Medications    amLODIPine (NORVASC) 5 MG tablet                     [1]   Current Outpatient Medications:     albuterol (PROVENTIL) 2.5 mg /3 mL (0.083 %) nebulizer solution, USE 1 VIAL IN NEBULIZER 4 TIMES DAILY AS NEEDED FOR WHEEZING *RESCUE, Disp: 150 mL, Rfl: 11    albuterol (PROVENTIL/VENTOLIN HFA) 90 mcg/actuation inhaler, Inhale 2 puffs into the  lungs every 6 (six) hours as needed for Wheezing. Rescue, Disp: 18 g, Rfl: 11    amLODIPine (NORVASC) 5 MG tablet, Take 1 tablet (5 mg total) by mouth once daily., Disp: 90 tablet, Rfl: 3    budesonide-glycopyr-formoterol (BREZTRI AEROSPHERE) 160-9-4.8 mcg/actuation HFAA, Inhale 2 puffs by mouth twice daily, Disp: 11 g, Rfl: 6    busPIRone (BUSPAR) 5 MG Tab, Take 5 mg by mouth 3 (three) times daily. prn, Disp: , Rfl:     calcium carbonate (OS-NASRIN) 600 mg calcium (1,500 mg) Tab, Take 1,200 mg by mouth every evening., Disp: , Rfl:     cyanocobalamin 1,000 mcg/mL injection, Inject 1,000 mcg into the muscle every 7 days., Disp: , Rfl:     ergocalciferol (ERGOCALCIFEROL) 50,000 unit Cap, Take 50,000 Units by mouth every 7 days., Disp: , Rfl:     HUMIRA,CF, PEN 40 mg/0.4 mL PnKt, , Disp: , Rfl:     hypromellose (SYSTANE GEL OPHT), Apply 1 drop to eye as needed (dry eyes)., Disp: , Rfl:     lisinopriL (PRINIVIL,ZESTRIL) 40 MG tablet, Take 1 tablet (40 mg total) by mouth once daily., Disp: 90 tablet, Rfl: 3    nebulizer accessories (NEBULIZER MISC), 1 each by Misc.(Non-Drug; Combo Route) route daily as needed. RAJNI: 99 Dispense with preferred suppies, Disp: , Rfl:     nebulizers Misc, 1 each by Misc.(Non-Drug; Combo Route) route daily as needed (PRN COPD). RAJNI: 99 Dispense with preferred supplies, Disp: 1 each, Rfl: 0    nicotine (NICODERM CQ) 14 mg/24 hr, Place 1 patch onto the skin once daily., Disp: , Rfl:     ofloxacin (OCUFLOX) 0.3 % ophthalmic solution, Place 1 drop into the right eye 2 (two) times daily., Disp: , Rfl:     OXcarbazepine (TRILEPTAL) 600 MG Tab, TAKE 2 TABLETS (1,200 MG) BY MOUTH IN THE EVENING, Disp: 180 tablet, Rfl: 4    zoledronic acid-mannitol & water (RECLAST) 5 mg/100 mL PgBk, Inject 5 mg into the vein once. IVPB once yearly, Disp: , Rfl:

## 2025-03-18 ENCOUNTER — OFFICE VISIT (OUTPATIENT)
Dept: FAMILY MEDICINE | Facility: CLINIC | Age: 65
End: 2025-03-18
Payer: COMMERCIAL

## 2025-03-18 VITALS
WEIGHT: 110.19 LBS | RESPIRATION RATE: 18 BRPM | TEMPERATURE: 98 F | HEIGHT: 61 IN | BODY MASS INDEX: 20.8 KG/M2 | DIASTOLIC BLOOD PRESSURE: 70 MMHG | OXYGEN SATURATION: 92 % | SYSTOLIC BLOOD PRESSURE: 144 MMHG | HEART RATE: 81 BPM

## 2025-03-18 DIAGNOSIS — I10 PRIMARY HYPERTENSION: Primary | Chronic | ICD-10-CM

## 2025-03-18 DIAGNOSIS — H61.23 BILATERAL IMPACTED CERUMEN: ICD-10-CM

## 2025-03-18 RX ORDER — AMLODIPINE BESYLATE 10 MG/1
10 TABLET ORAL DAILY
Qty: 90 TABLET | Refills: 3 | Status: SHIPPED | OUTPATIENT
Start: 2025-03-18 | End: 2026-03-18

## 2025-03-18 NOTE — ASSESSMENT & PLAN NOTE
Pre-Procedural  Bilateral ear canal noted impacted with hard cerumen  Verbal consent given     Procedure  Bilateral ear canal irrigated with warm water using Rhino-Ear washer  Right ear, tolerated well  Left ear, unable to completely remove due to pain and hardened consistency of wax     Post-Procedure  Right TM without any erythema or drainage; right ear canal clear   Left ear canal note with hard cerumen     Referral placed to ENT for removal

## 2025-03-18 NOTE — ASSESSMENT & PLAN NOTE
BP elevated; Asymptomatic  Currently taking Lisinopril 40 mg po a.m. and Norvasc 5 mg po nightly   Continue Lisinopril  Increase Norvasc to 10 mg po daily  Instructed to take BP meds prior to all clinic appointments  Instructed to report to ED for any BP >200/100, SOB, CP, and/or worsening symptoms  Daily BP check; bring log to all appointments  Keep appt for follow up  Verbalizes all understanding

## 2025-03-18 NOTE — PROGRESS NOTES
Subjective:      Patient ID: Judy Vides is a 64 y.o. White female      Chief Complaint: Follow-up (Hypertension) and Cerumen Removal       Past Medical History:   Diagnosis Date    Chronic obstructive pulmonary disease 08/17/2022    Crohn disease     Epilepsy     Generalized anxiety disorder 08/17/2022    Headache 08/17/2022    Hemorrhoids, external 02/23/2023    Hypertension     Neck pain 08/17/2022    Osteoporosis     Seizure disorder 08/17/2022    Tobacco user 08/17/2022    Vitamin B deficiency, unspecified 02/23/2023        HPI  Presents today for re-evaluation of BP and cerumen removal.      HTN:  BP elevated at previous visit.  Medication changes were made.  Currently taking Currently taking Lisinopril 40 mg po a.m. and Norvasc 5 mg po nightly.  BP elevated today; states 's at home.  States compliance with medications.  Denies any headaches, dizziness, syncope, CP, or SOB.      Cerumen Impaction:  Pt did not start Debrox as instructed at previous visit.          Patient Care Team:  Anita Mcqueen MD as PCP - General (Family Medicine)  Angie Falk NP as Nurse Practitioner (Neurology)  Wanda Cedeño ANP as Nurse Practitioner (Internal Medicine)  Marcello Xiao MD as Consulting Physician (Endocrinology)  Ricky Schmidt MD as Consulting Physician (Pulmonary Disease)      Review of Systems   Constitutional: Negative.  Negative for appetite change, chills and fever.   HENT: Negative.     Eyes: Negative.  Negative for discharge and redness.   Respiratory: Negative.  Negative for shortness of breath.    Cardiovascular: Negative.  Negative for chest pain.   Gastrointestinal: Negative.    Endocrine: Negative.    Genitourinary: Negative.    Integumentary:  Negative.   Allergic/Immunologic: Negative.    Neurological: Negative.    Psychiatric/Behavioral: Negative.     All other systems reviewed and are negative.          Objective:      Vitals:    03/18/25 0830   BP: (!) 144/70   Pulse:     Resp:    Temp:       Body mass index is 20.82 kg/m².     Physical Exam  Vitals reviewed.   Constitutional:       Appearance: Normal appearance.   HENT:      Head: Normocephalic.      Right Ear: There is impacted cerumen (cerumen noted with hard consistency).      Left Ear: There is impacted cerumen (cerumen noted with hard consistency).      Mouth/Throat:      Mouth: Mucous membranes are moist.   Eyes:      Conjunctiva/sclera: Conjunctivae normal.      Pupils: Pupils are equal, round, and reactive to light.   Cardiovascular:      Rate and Rhythm: Normal rate and regular rhythm.   Pulmonary:      Effort: Pulmonary effort is normal. No respiratory distress.      Breath sounds: Normal breath sounds.   Musculoskeletal:         General: Normal range of motion.      Cervical back: Normal range of motion and neck supple.   Skin:     General: Skin is warm and dry.   Neurological:      Mental Status: She is alert and oriented to person, place, and time.   Psychiatric:         Mood and Affect: Mood normal.          Current Medications[1]    Assessment & Plan:     Problem List Items Addressed This Visit          ENT    Bilateral impacted cerumen    Pre-Procedural  Bilateral ear canal noted impacted with hard cerumen  Verbal consent given     Procedure  Bilateral ear canal irrigated with warm water using Rhino-Ear washer  Right ear, tolerated well  Left ear, unable to completely remove due to pain and hardened consistency of wax     Post-Procedure  Right TM without any erythema or drainage; right ear canal clear   Left ear canal note with hard cerumen     Referral placed to ENT for removal           Relevant Orders    Ambulatory referral/consult to ENT       Cardiac/Vascular    Hypertension - Primary (Chronic)    BP elevated; Asymptomatic  Currently taking Lisinopril 40 mg po a.m. and Norvasc 5 mg po nightly   Continue Lisinopril  Increase Norvasc to 10 mg po daily  Instructed to take BP meds prior to all clinic  appointments  Instructed to report to ED for any BP >200/100, SOB, CP, and/or worsening symptoms  Daily BP check; bring log to all appointments  Keep appt for follow up  Verbalizes all understanding           Relevant Medications    amLODIPine (NORVASC) 10 MG tablet                     [1]   Current Outpatient Medications:     albuterol (PROVENTIL) 2.5 mg /3 mL (0.083 %) nebulizer solution, USE 1 VIAL IN NEBULIZER 4 TIMES DAILY AS NEEDED FOR WHEEZING *RESCUE, Disp: 150 mL, Rfl: 11    albuterol (PROVENTIL/VENTOLIN HFA) 90 mcg/actuation inhaler, Inhale 2 puffs into the lungs every 6 (six) hours as needed for Wheezing. Rescue, Disp: 18 g, Rfl: 11    amLODIPine (NORVASC) 10 MG tablet, Take 1 tablet (10 mg total) by mouth once daily., Disp: 90 tablet, Rfl: 3    budesonide-glycopyr-formoterol (BREZTRI AEROSPHERE) 160-9-4.8 mcg/actuation HFAA, Inhale 2 puffs into the lungs 2 (two) times daily., Disp: 11 g, Rfl: 6    busPIRone (BUSPAR) 5 MG Tab, Take 5 mg by mouth 3 (three) times daily. prn, Disp: , Rfl:     calcium carbonate (OS-NASRIN) 600 mg calcium (1,500 mg) Tab, Take 1,200 mg by mouth every evening., Disp: , Rfl:     cyanocobalamin 1,000 mcg/mL injection, Inject 1,000 mcg into the muscle every 7 days., Disp: , Rfl:     ergocalciferol (ERGOCALCIFEROL) 50,000 unit Cap, Take 50,000 Units by mouth every 7 days., Disp: , Rfl:     HUMIRA,CF, PEN 40 mg/0.4 mL PnKt, , Disp: , Rfl:     hypromellose (SYSTANE GEL OPHT), Apply 1 drop to eye as needed (dry eyes)., Disp: , Rfl:     lisinopriL (PRINIVIL,ZESTRIL) 40 MG tablet, Take 1 tablet (40 mg total) by mouth once daily., Disp: 90 tablet, Rfl: 3    nebulizer accessories (NEBULIZER MISC), 1 each by Misc.(Non-Drug; Combo Route) route daily as needed. RAJNI: 99 Dispense with preferred suppies, Disp: , Rfl:     nebulizers Misc, 1 each by Misc.(Non-Drug; Combo Route) route daily as needed (PRN COPD). RAJNI: 99 Dispense with preferred supplies, Disp: 1 each, Rfl: 0    ofloxacin (OCUFLOX) 0.3  % ophthalmic solution, Place 1 drop into the right eye 2 (two) times daily., Disp: , Rfl:     OXcarbazepine (TRILEPTAL) 600 MG Tab, TAKE 2 TABLETS (1,200 MG) BY MOUTH IN THE EVENING, Disp: 180 tablet, Rfl: 4    zoledronic acid-mannitol & water (RECLAST) 5 mg/100 mL PgBk, Inject 5 mg into the vein once. IVPB once yearly, Disp: , Rfl:

## 2025-04-15 ENCOUNTER — OFFICE VISIT (OUTPATIENT)
Dept: FAMILY MEDICINE | Facility: CLINIC | Age: 65
End: 2025-04-15
Payer: MEDICARE

## 2025-04-15 VITALS
HEIGHT: 61 IN | RESPIRATION RATE: 16 BRPM | TEMPERATURE: 98 F | HEART RATE: 83 BPM | BODY MASS INDEX: 20.79 KG/M2 | DIASTOLIC BLOOD PRESSURE: 72 MMHG | OXYGEN SATURATION: 93 % | WEIGHT: 110.13 LBS | SYSTOLIC BLOOD PRESSURE: 138 MMHG

## 2025-04-15 DIAGNOSIS — I10 PRIMARY HYPERTENSION: Primary | Chronic | ICD-10-CM

## 2025-04-15 DIAGNOSIS — J44.9 CHRONIC OBSTRUCTIVE PULMONARY DISEASE, UNSPECIFIED COPD TYPE: ICD-10-CM

## 2025-04-15 RX ORDER — AMLODIPINE BESYLATE 10 MG/1
10 TABLET ORAL DAILY
Qty: 90 TABLET | Refills: 3 | Status: SHIPPED | OUTPATIENT
Start: 2025-04-15 | End: 2026-04-15

## 2025-04-15 RX ORDER — LISINOPRIL 40 MG/1
40 TABLET ORAL DAILY
Qty: 90 TABLET | Refills: 3 | Status: SHIPPED | OUTPATIENT
Start: 2025-04-15

## 2025-04-15 NOTE — ASSESSMENT & PLAN NOTE
BP at goal today. Continue Lisinopril 40 mg and Norvasc 10 mg, refills sent. Keep blood pressure log and bring with you to all appts. Keep f/u appts as scheduled.   Well controlled.   Hypertension Medications              amLODIPine (NORVASC) 10 MG tablet Take 1 tablet (10 mg total) by mouth once daily.    lisinopriL (PRINIVIL,ZESTRIL) 40 MG tablet Take 1 tablet (40 mg total) by mouth once daily.          Low Sodium Diet (DASH Diet - Less than 2 grams of sodium per day).  Monitor blood pressure daily and log. Report consistent numbers greater than 140/90.  Maintain healthy weight with goal BMI <30. Exercise 30 minutes per day, 5 days per week.  Smoking cessation encouraged to aid in BP reduction.

## 2025-04-15 NOTE — PROGRESS NOTES
Family Medicine  Mary Peres, FNP-C    Patient ID: [id number]     Chief Complaint: Follow-up (HTN Follow up)      HPI:     Patient presents to clinic for follow up of hypertension. At [facility] her norvasc was increased to 10 mg daily due to elevated blood pressure readings. She is currently on Lisinopril 40 mg and Norvasc 10 mg. She states she keeps a log at home, sometimes has elevated readings but most are <140-90. She denies headaches, vision changes, blurry vision, chest pain, palpitations, or shortness of breath.     She previously saw Dr. Phillips, she is due for a wellness visit in August.     She has copd.  Previously she saw dr. Hopkins. States he would not continue to see her due to not wanting to quit smoking.   Using breztri, albuterol inhaler, and albuterol nebs.  Symbicort is not too expensive and need find alternate. She is using nicotine patches but having to take them off after 7 hours to smoke. She is asking for samples of breztri.      She has anxiety and is on buspar. Has not been taking lately. Her mother is 94.      She has htn. Reports compliance with her meds.      She has crohns and follows with GI NP Wanda Cedeño. She is on humira.  Doing well. Now seeing new person.  She has low b12 and does injections once weekly.  Her sister gives it to her.      She has seizures and follows with neuro NP Angie Falk.  Is on oxcarbazepine.  No seizures in years.  Was told she didn't need to follow up for 2 years.      She is postmenopausal and has osteoporosis. Last dexa 12/2022 showed osteoporosis. She has low vitamin d and supplements otc.  She is on reclast, calcium and vit d per dr. Xiao office at [facility]. He orders her dexa.  Last mmg 12/2022.       She is smoker.  Started smoking at age 13.  1 pack every 2 days. She had lung screening with Dr. Schmidt 3/2025 and he saw scar tissue, wants to repeat in 6 months. Colonoscopy 1/2020 with dr. Jimenez, now seeing Dr. Marlow.         Past Medical History:    Diagnosis Date    Chronic obstructive pulmonary disease 08/17/2022    Crohn disease     Epilepsy     Generalized anxiety disorder 08/17/2022    Headache 08/17/2022    Hemorrhoids, external 02/23/2023    Hypertension     Neck pain 08/17/2022    Osteoporosis     Seizure disorder 08/17/2022    Tobacco user 08/17/2022    Vitamin B deficiency, unspecified 02/23/2023        Past Surgical History:   Procedure Laterality Date    APPENDECTOMY      CATARACT EXTRACTION      EYE SURGERY          Social History     Tobacco Use    Smoking status: Every Day     Current packs/day: 0.50     Average packs/day: 0.5 packs/day for 50.3 years (25.1 ttl pk-yrs)     Types: Cigarettes     Start date: 1975    Smokeless tobacco: Never   Substance and Sexual Activity    Alcohol use: Not Currently    Drug use: Not Currently    Sexual activity: Not Currently        Current Outpatient Medications   Medication Instructions    albuterol (PROVENTIL) 2.5 mg /3 mL (0.083 %) nebulizer solution USE 1 VIAL IN NEBULIZER 4 TIMES DAILY AS NEEDED FOR WHEEZING *RESCUE    albuterol (PROVENTIL/VENTOLIN HFA) 90 mcg/actuation inhaler 2 puffs, Inhalation, Every 6 hours PRN, Rescue    amLODIPine (NORVASC) 10 mg, Oral, Daily    budesonide-glycopyr-formoterol (BREZTRI AEROSPHERE) 160-9-4.8 mcg/actuation HFAA 2 puffs, Inhalation, 2 times daily    busPIRone (BUSPAR) 5 mg, 3 times daily    calcium carbonate (OS-NASRIN) 1,200 mg, Nightly    cyanocobalamin 1,000 mcg, Every 7 days    ergocalciferol (ERGOCALCIFEROL) 50,000 Units, Every 7 days    HUMIRA,CF, PEN 40 mg/0.4 mL PnKt     hypromellose (SYSTANE GEL OPHT) 1 drop, As needed (PRN)    lisinopriL (PRINIVIL,ZESTRIL) 40 mg, Oral, Daily    nebulizer accessories (NEBULIZER MISC) 1 each, Daily PRN    nebulizers Misc 1 each, Misc.(Non-Drug; Combo Route), Daily PRN, RAJNI: 99 Dispense with preferred supplies    ofloxacin (OCUFLOX) 0.3 % ophthalmic solution 1 drop, 2 times daily    OXcarbazepine (TRILEPTAL) 600 MG Tab TAKE 2  "TABLETS (1,200 MG) BY MOUTH IN THE EVENING    zoledronic acid-mannitol & water (RECLAST) 5 mg/100 mL PgBk 5 mg, Once       Review of patient's allergies indicates:  No Known Allergies     Patient Care Team:  Anita Mcqueen MD as PCP - General (Family Medicine)  Angie Falk NP as Nurse Practitioner (Neurology)  Wanda Cedeño ANP as Nurse Practitioner (Internal Medicine)  Marcello Xiao MD as Consulting Physician (Endocrinology)  Ricky Schmidt MD as Consulting Physician (Pulmonary Disease)     Subjective:     Review of Systems   Constitutional: Negative.    HENT: Negative.     Eyes: Negative.    Respiratory: Negative.     Cardiovascular: Negative.    Gastrointestinal: Negative.    Endocrine: Negative.    Genitourinary: Negative.    Musculoskeletal: Negative.  Negative for myalgias.   Skin: Negative.    Allergic/Immunologic: Negative.    Neurological: Negative.    Hematological: Negative.    Psychiatric/Behavioral: Negative.           Objective:     Visit Vitals  /72 (BP Location: Right arm, Patient Position: Sitting)   Pulse 83   Temp 98.2 °F (36.8 °C) (Oral)   Resp 16   Ht 5' 1" (1.549 m)   Wt 49.9 kg (110 lb 1.6 oz)   SpO2 (!) 93%   BMI 20.80 kg/m²       Physical Exam  Vitals and nursing note reviewed.   Constitutional:       General: She is not in acute distress.     Appearance: Normal appearance. She is not ill-appearing.   HENT:      Head: Normocephalic and atraumatic.      Mouth/Throat:      Mouth: Mucous membranes are moist.   Eyes:      Pupils: Pupils are equal, round, and reactive to light.   Cardiovascular:      Rate and Rhythm: Normal rate and regular rhythm.      Heart sounds: No murmur heard.     No friction rub. No gallop.   Pulmonary:      Effort: Pulmonary effort is normal. No respiratory distress.      Breath sounds: Wheezing present. No rhonchi or rales.   Abdominal:      General: Abdomen is flat.      Palpations: Abdomen is soft.   Skin:     General: Skin is warm and dry. "   Neurological:      General: No focal deficit present.      Mental Status: She is alert.   Psychiatric:         Mood and Affect: Mood normal.         Labs Reviewed:     Chemistry:  Lab Results   Component Value Date     11/29/2024    K 3.7 11/29/2024    BUN 22.9 (H) 11/29/2024    CREATININE 0.64 11/29/2024    EGFRNORACEVR >60 11/29/2024    GLUCOSE 94 11/29/2024    CALCIUM 8.5 11/29/2024    ALKPHOS 139 11/28/2024    LABPROT 6.0 11/28/2024    ALBUMIN 2.4 (L) 11/28/2024    BILIDIR 0.2 01/19/2022    IBILI 0.10 01/19/2022    AST 16 11/28/2024    ALT 17 11/28/2024    MG 1.60 11/29/2024    PHOS 2.5 11/29/2024    DOOIRVQN08BU 62 08/20/2024    TSH 0.919 08/20/2024        Lab Results   Component Value Date    HGBA1C 5.6 11/29/2024        Hematology:  Lab Results   Component Value Date    WBC 12.90 (H) 11/29/2024    HGB 11.6 (L) 11/29/2024    HCT 35.2 (L) 11/29/2024     (H) 11/29/2024       Lipid Panel:  Lab Results   Component Value Date    CHOL 196 08/20/2024    HDL 72 (H) 08/20/2024    .00 08/20/2024    TRIG 49 08/20/2024    TOTALCHOLEST 3 08/20/2024        Urine:  Lab Results   Component Value Date    APPEARANCEUA Clear 08/20/2024    SGUA >=1.030 08/20/2024    PROTEINUA Negative 08/20/2024    KETONESUA Negative 08/20/2024    LEUKOCYTESUR Negative 08/20/2024    RBCUA <5 07/22/2022    WBCUA <5 07/22/2022    BACTERIA None Seen 07/22/2022        Assessment:       ICD-10-CM ICD-9-CM   1. Primary hypertension  I10 401.9   2. Chronic obstructive pulmonary disease, unspecified COPD type  J44.9 496        Plan:     1. Primary hypertension  Assessment & Plan:  BP at goal today. Continue Lisinopril 40 mg and Norvasc 10 mg, refills sent. Keep blood pressure log and bring with you to all appts. Keep f/u appts as scheduled.   Well controlled.   Hypertension Medications              amLODIPine (NORVASC) 10 MG tablet Take 1 tablet (10 mg total) by mouth once daily.    lisinopriL (PRINIVIL,ZESTRIL) 40 MG tablet Take 1  tablet (40 mg total) by mouth once daily.          Low Sodium Diet (DASH Diet - Less than 2 grams of sodium per day).  Monitor blood pressure daily and log. Report consistent numbers greater than 140/90.  Maintain healthy weight with goal BMI <30. Exercise 30 minutes per day, 5 days per week.  Smoking cessation encouraged to aid in BP reduction.     Orders:  -     amLODIPine (NORVASC) 10 MG tablet; Take 1 tablet (10 mg total) by mouth once daily.  Dispense: 90 tablet; Refill: 3  -     lisinopriL (PRINIVIL,ZESTRIL) 40 MG tablet; Take 1 tablet (40 mg total) by mouth once daily.  Dispense: 90 tablet; Refill: 3    2. Chronic obstructive pulmonary disease, unspecified COPD type  Assessment & Plan:  Keep follow up appts with pulmonology as scheduled  Samples of breztri given.            No follow-ups on file. In addition to their scheduled follow up, the patient has also been instructed to follow up on as needed basis.     Future Appointments   Date Time Provider Department Center   9/8/2025  8:30 AM [facility] [place]   12/9/2025  7:40 AM [facility] ENDOCRINOLOGY [facility] JESSICA Carroll   4/6/2026  2:40 PM [facility]

## 2025-07-01 RX ORDER — METHYLPREDNISOLONE SOD SUCC 125 MG
125 VIAL (EA) INJECTION ONCE AS NEEDED
OUTPATIENT
Start: 2025-07-01

## 2025-07-01 RX ORDER — EPINEPHRINE 0.3 MG/.3ML
0.3 INJECTION SUBCUTANEOUS ONCE AS NEEDED
OUTPATIENT
Start: 2025-07-01

## 2025-07-01 RX ORDER — DIPHENHYDRAMINE HYDROCHLORIDE 50 MG/ML
50 INJECTION, SOLUTION INTRAMUSCULAR; INTRAVENOUS ONCE AS NEEDED
OUTPATIENT
Start: 2025-07-01

## 2025-07-01 RX ORDER — SODIUM CHLORIDE 0.9 % (FLUSH) 0.9 %
10 SYRINGE (ML) INJECTION
OUTPATIENT
Start: 2025-07-01

## 2025-07-01 RX ORDER — HEPARIN 100 UNIT/ML
500 SYRINGE INTRAVENOUS
OUTPATIENT
Start: 2025-07-01

## 2025-07-17 ENCOUNTER — INFUSION (OUTPATIENT)
Dept: INFUSION THERAPY | Facility: HOSPITAL | Age: 65
End: 2025-07-17
Attending: FAMILY MEDICINE
Payer: MEDICARE

## 2025-07-17 VITALS
WEIGHT: 110.13 LBS | SYSTOLIC BLOOD PRESSURE: 149 MMHG | HEART RATE: 81 BPM | BODY MASS INDEX: 20.79 KG/M2 | HEIGHT: 61 IN | RESPIRATION RATE: 18 BRPM | DIASTOLIC BLOOD PRESSURE: 81 MMHG

## 2025-07-17 DIAGNOSIS — K50.919 CROHN'S DISEASE WITH COMPLICATION, UNSPECIFIED GASTROINTESTINAL TRACT LOCATION: Primary | ICD-10-CM

## 2025-07-17 PROCEDURE — 96365 THER/PROPH/DIAG IV INF INIT: CPT

## 2025-07-17 PROCEDURE — 25000003 PHARM REV CODE 250: Performed by: INTERNAL MEDICINE

## 2025-07-17 PROCEDURE — 63600175 PHARM REV CODE 636 W HCPCS: Mod: JZ,TB | Performed by: INTERNAL MEDICINE

## 2025-07-17 RX ORDER — DIPHENHYDRAMINE HYDROCHLORIDE 50 MG/ML
50 INJECTION, SOLUTION INTRAMUSCULAR; INTRAVENOUS ONCE AS NEEDED
OUTPATIENT
Start: 2025-07-17

## 2025-07-17 RX ORDER — HEPARIN 100 UNIT/ML
500 SYRINGE INTRAVENOUS
Status: DISCONTINUED | OUTPATIENT
Start: 2025-07-17 | End: 2025-07-17 | Stop reason: HOSPADM

## 2025-07-17 RX ORDER — METHYLPREDNISOLONE SOD SUCC 125 MG
125 VIAL (EA) INJECTION ONCE AS NEEDED
OUTPATIENT
Start: 2025-07-17

## 2025-07-17 RX ORDER — EPINEPHRINE 0.3 MG/.3ML
0.3 INJECTION SUBCUTANEOUS ONCE AS NEEDED
OUTPATIENT
Start: 2025-07-17

## 2025-07-17 RX ORDER — HEPARIN 100 UNIT/ML
500 SYRINGE INTRAVENOUS
OUTPATIENT
Start: 2025-07-17

## 2025-07-17 RX ORDER — SODIUM CHLORIDE 0.9 % (FLUSH) 0.9 %
10 SYRINGE (ML) INJECTION
OUTPATIENT
Start: 2025-07-17

## 2025-07-17 RX ORDER — SODIUM CHLORIDE 0.9 % (FLUSH) 0.9 %
10 SYRINGE (ML) INJECTION
Status: DISCONTINUED | OUTPATIENT
Start: 2025-07-17 | End: 2025-07-17 | Stop reason: HOSPADM

## 2025-07-17 RX ADMIN — DEXTROSE MONOHYDRATE 600 MG: 50 INJECTION, SOLUTION INTRAVENOUS at 02:07

## 2025-08-04 DIAGNOSIS — I10 PRIMARY HYPERTENSION: Chronic | ICD-10-CM

## 2025-08-04 RX ORDER — LISINOPRIL 40 MG/1
40 TABLET ORAL DAILY
Qty: 90 TABLET | Refills: 0 | Status: SHIPPED | OUTPATIENT
Start: 2025-08-04

## 2025-08-04 RX ORDER — AMLODIPINE BESYLATE 10 MG/1
10 TABLET ORAL DAILY
Qty: 90 TABLET | Refills: 0 | Status: SHIPPED | OUTPATIENT
Start: 2025-08-04 | End: 2026-08-04

## 2025-08-04 NOTE — TELEPHONE ENCOUNTER
Copied from CRM #[id number]. Topic: Medications - Pharmacy  >> Aug 4, 2025  3:49 PM Glenny wrote:  Who Called: Pharmacy     Refill or New Rx:Refill  RX Name and Strength:lisinopriL (PRINIVIL,ZESTRIL) 40 MG tablet  How is the patient currently taking it? (ex. 1XDay):  Is this a 30 day or 90 day RX:  Local or Mail Order:  List of preferred pharmacies on file (remove unneeded): [unfilled]  If different Pharmacy is requested, enter Pharmacy information here including location and phone number: [facility] [id number] [facility]    Ordering Provider:      Preferred Method of Contact: Phone Call  Patient's Preferred Phone Number on File: [phone]   Best Call Back Number, if different:  Additional Information:

## 2025-08-04 NOTE — TELEPHONE ENCOUNTER
Copied from CRM #[id number]. Topic: Medications - Pharmacy  >> Aug 4, 2025  3:45 PM Kira wrote:  .Who Called: Yaritza with [employer]     Refill or New Rx:New Rx  RX Name and Strength:amLODIPine (NORVASC) 10 MG tablet  How is the patient currently taking it? (ex. 1XDay):Sig - Route: Take 1 tablet (10 mg total) by mouth once daily. - Oral  Is this a 30 day or 90 day RX:90  Local or Mail Order:Local   List of preferred pharmacies on file (remove unneeded): [id number] - [place], [place] - [address] [facility] [address]   Phone: [phone]  Fax: [phone]        Ordering Provider:N/A      Preferred Method of Contact: Phone Call  Patient's Preferred Phone Number on File: [phone]     Best Call Back Number, if different:    Additional Information: N/A

## 2025-08-14 ENCOUNTER — INFUSION (OUTPATIENT)
Dept: INFUSION THERAPY | Facility: HOSPITAL | Age: 65
End: 2025-08-14
Attending: FAMILY MEDICINE
Payer: MEDICARE

## 2025-08-14 VITALS
DIASTOLIC BLOOD PRESSURE: 81 MMHG | TEMPERATURE: 98 F | HEART RATE: 77 BPM | WEIGHT: 113.81 LBS | BODY MASS INDEX: 21.49 KG/M2 | OXYGEN SATURATION: 92 % | SYSTOLIC BLOOD PRESSURE: 158 MMHG

## 2025-08-14 DIAGNOSIS — K50.919 CROHN'S DISEASE WITH COMPLICATION, UNSPECIFIED GASTROINTESTINAL TRACT LOCATION: Primary | ICD-10-CM

## 2025-08-14 PROCEDURE — 63600175 PHARM REV CODE 636 W HCPCS: Mod: JZ,TB | Performed by: INTERNAL MEDICINE

## 2025-08-14 PROCEDURE — 96365 THER/PROPH/DIAG IV INF INIT: CPT

## 2025-08-14 PROCEDURE — 25000003 PHARM REV CODE 250: Performed by: INTERNAL MEDICINE

## 2025-08-14 RX ORDER — SODIUM CHLORIDE 0.9 % (FLUSH) 0.9 %
10 SYRINGE (ML) INJECTION
Status: DISCONTINUED | OUTPATIENT
Start: 2025-08-14 | End: 2025-08-14 | Stop reason: HOSPADM

## 2025-08-14 RX ORDER — DIPHENHYDRAMINE HYDROCHLORIDE 50 MG/ML
50 INJECTION, SOLUTION INTRAMUSCULAR; INTRAVENOUS ONCE AS NEEDED
OUTPATIENT
Start: 2025-08-14

## 2025-08-14 RX ORDER — HEPARIN 100 UNIT/ML
500 SYRINGE INTRAVENOUS
Status: DISCONTINUED | OUTPATIENT
Start: 2025-08-14 | End: 2025-08-14 | Stop reason: HOSPADM

## 2025-08-14 RX ORDER — HEPARIN 100 UNIT/ML
500 SYRINGE INTRAVENOUS
OUTPATIENT
Start: 2025-08-14

## 2025-08-14 RX ORDER — DIPHENHYDRAMINE HYDROCHLORIDE 50 MG/ML
50 INJECTION, SOLUTION INTRAMUSCULAR; INTRAVENOUS ONCE AS NEEDED
Status: DISCONTINUED | OUTPATIENT
Start: 2025-08-14 | End: 2025-08-14 | Stop reason: HOSPADM

## 2025-08-14 RX ORDER — EPINEPHRINE 0.3 MG/.3ML
0.3 INJECTION SUBCUTANEOUS ONCE AS NEEDED
Status: DISCONTINUED | OUTPATIENT
Start: 2025-08-14 | End: 2025-08-14 | Stop reason: HOSPADM

## 2025-08-14 RX ORDER — METHYLPREDNISOLONE SOD SUCC 125 MG
125 VIAL (EA) INJECTION ONCE AS NEEDED
Status: DISCONTINUED | OUTPATIENT
Start: 2025-08-14 | End: 2025-08-14 | Stop reason: HOSPADM

## 2025-08-14 RX ORDER — EPINEPHRINE 0.3 MG/.3ML
0.3 INJECTION SUBCUTANEOUS ONCE AS NEEDED
OUTPATIENT
Start: 2025-08-14

## 2025-08-14 RX ORDER — METHYLPREDNISOLONE SOD SUCC 125 MG
125 VIAL (EA) INJECTION ONCE AS NEEDED
OUTPATIENT
Start: 2025-08-14

## 2025-08-14 RX ORDER — SODIUM CHLORIDE 0.9 % (FLUSH) 0.9 %
10 SYRINGE (ML) INJECTION
OUTPATIENT
Start: 2025-08-14

## 2025-08-14 RX ADMIN — DEXTROSE MONOHYDRATE 600 MG: 50 INJECTION, SOLUTION INTRAVENOUS at 02:08

## 2025-08-14 RX ADMIN — SODIUM CHLORIDE: 9 INJECTION, SOLUTION INTRAVENOUS at 02:08

## 2025-08-28 ENCOUNTER — TELEPHONE (OUTPATIENT)
Dept: FAMILY MEDICINE | Facility: CLINIC | Age: 65
End: 2025-08-28
Payer: MEDICARE